# Patient Record
Sex: MALE | Race: WHITE | NOT HISPANIC OR LATINO | Employment: FULL TIME | URBAN - METROPOLITAN AREA
[De-identification: names, ages, dates, MRNs, and addresses within clinical notes are randomized per-mention and may not be internally consistent; named-entity substitution may affect disease eponyms.]

---

## 2017-05-23 ENCOUNTER — TRANSCRIBE ORDERS (OUTPATIENT)
Dept: URGENT CARE | Facility: CLINIC | Age: 52
End: 2017-05-23

## 2017-05-23 ENCOUNTER — APPOINTMENT (OUTPATIENT)
Dept: LAB | Facility: CLINIC | Age: 52
End: 2017-05-23
Payer: COMMERCIAL

## 2017-05-23 DIAGNOSIS — N42.9 UNSPECIFIED DISORDER OF PROSTATE: ICD-10-CM

## 2017-05-23 DIAGNOSIS — I10 ESSENTIAL HYPERTENSION, MALIGNANT: Primary | ICD-10-CM

## 2017-05-23 DIAGNOSIS — E55.9 UNSPECIFIED VITAMIN D DEFICIENCY: ICD-10-CM

## 2017-05-23 DIAGNOSIS — E78.5 OTHER AND UNSPECIFIED HYPERLIPIDEMIA: ICD-10-CM

## 2017-05-23 DIAGNOSIS — Z79.82 ENCOUNTER FOR LONG-TERM (CURRENT) USE OF ASPIRIN: ICD-10-CM

## 2017-05-23 DIAGNOSIS — E13.8 DIABETES MELLITUS OF OTHER TYPE WITH COMPLICATION: ICD-10-CM

## 2017-05-23 DIAGNOSIS — Z79.899 ENCOUNTER FOR LONG-TERM (CURRENT) USE OF OTHER MEDICATIONS: ICD-10-CM

## 2017-05-23 DIAGNOSIS — I10 ESSENTIAL HYPERTENSION, MALIGNANT: ICD-10-CM

## 2017-05-23 LAB
25(OH)D3 SERPL-MCNC: 15.9 NG/ML (ref 30–100)
ALBUMIN SERPL BCP-MCNC: 4.1 G/DL (ref 3.5–5)
ALP SERPL-CCNC: 55 U/L (ref 46–116)
ALT SERPL W P-5'-P-CCNC: 25 U/L (ref 12–78)
ANION GAP SERPL CALCULATED.3IONS-SCNC: 4 MMOL/L (ref 4–13)
AST SERPL W P-5'-P-CCNC: 20 U/L (ref 5–45)
BILIRUB SERPL-MCNC: 0.57 MG/DL (ref 0.2–1)
BUN SERPL-MCNC: 13 MG/DL (ref 5–25)
CALCIUM SERPL-MCNC: 9.2 MG/DL (ref 8.3–10.1)
CHLORIDE SERPL-SCNC: 105 MMOL/L (ref 100–108)
CHOLEST SERPL-MCNC: 141 MG/DL (ref 50–200)
CO2 SERPL-SCNC: 30 MMOL/L (ref 21–32)
CREAT SERPL-MCNC: 0.9 MG/DL (ref 0.6–1.3)
ERYTHROCYTE [DISTWIDTH] IN BLOOD BY AUTOMATED COUNT: 12 % (ref 11.6–15.1)
EST. AVERAGE GLUCOSE BLD GHB EST-MCNC: 151 MG/DL
GFR SERPL CREATININE-BSD FRML MDRD: >60 ML/MIN/1.73SQ M
GLUCOSE P FAST SERPL-MCNC: 115 MG/DL (ref 65–99)
HBA1C MFR BLD: 6.9 % (ref 4.2–6.3)
HCT VFR BLD AUTO: 43.9 % (ref 36.5–49.3)
HDLC SERPL-MCNC: 27 MG/DL (ref 40–60)
HGB BLD-MCNC: 15 G/DL (ref 12–17)
LDLC SERPL CALC-MCNC: 85 MG/DL (ref 0–100)
MCH RBC QN AUTO: 31.6 PG (ref 26.8–34.3)
MCHC RBC AUTO-ENTMCNC: 34.2 G/DL (ref 31.4–37.4)
MCV RBC AUTO: 92 FL (ref 82–98)
PLATELET # BLD AUTO: 196 THOUSANDS/UL (ref 149–390)
PMV BLD AUTO: 11.8 FL (ref 8.9–12.7)
POTASSIUM SERPL-SCNC: 3.9 MMOL/L (ref 3.5–5.3)
PROT SERPL-MCNC: 8 G/DL (ref 6.4–8.2)
PSA SERPL-MCNC: 0.6 NG/ML (ref 0–4)
RBC # BLD AUTO: 4.75 MILLION/UL (ref 3.88–5.62)
SODIUM SERPL-SCNC: 139 MMOL/L (ref 136–145)
TRIGL SERPL-MCNC: 143 MG/DL
WBC # BLD AUTO: 9.42 THOUSAND/UL (ref 4.31–10.16)

## 2017-05-23 PROCEDURE — 83036 HEMOGLOBIN GLYCOSYLATED A1C: CPT

## 2017-05-23 PROCEDURE — 36415 COLL VENOUS BLD VENIPUNCTURE: CPT

## 2017-05-23 PROCEDURE — 80053 COMPREHEN METABOLIC PANEL: CPT

## 2017-05-23 PROCEDURE — 80061 LIPID PANEL: CPT

## 2017-05-23 PROCEDURE — 82306 VITAMIN D 25 HYDROXY: CPT

## 2017-05-23 PROCEDURE — 85027 COMPLETE CBC AUTOMATED: CPT

## 2017-05-23 PROCEDURE — G0103 PSA SCREENING: HCPCS

## 2017-10-12 ENCOUNTER — OFFICE VISIT (OUTPATIENT)
Dept: URGENT CARE | Facility: CLINIC | Age: 52
End: 2017-10-12
Payer: COMMERCIAL

## 2017-10-12 ENCOUNTER — TRANSCRIBE ORDERS (OUTPATIENT)
Dept: URGENT CARE | Facility: CLINIC | Age: 52
End: 2017-10-12

## 2017-10-12 PROCEDURE — 99213 OFFICE O/P EST LOW 20 MIN: CPT

## 2017-10-14 NOTE — PROGRESS NOTES
Assessment  1  Acute pharyngitis (462) (J02 9)    Plan  Acute pharyngitis    · Amoxicillin 875 MG Oral Tablet; Take 1 tab twice daily  Hyperlipidemia    · Clopidogrel Bisulfate 75 MG Oral Tablet    Discussion/Summary  Medication Side Effects Reviewed: Possible side effects of new medications were reviewed with the patient/guardian today  Understands and agrees with treatment plan: The treatment plan was reviewed with the patient/guardian  The patient/guardian understands and agrees with the treatment plan   Follow Up Instructions: Follow Up with your Primary Care Provider in 7 days  If your symptoms worsen, go to the nearest [facility] Emergency Department  Chief Complaint  1  Sore Throat  Chief Complaint Free Text Note Form: C/O fever, chills, sore throat and difficulty swallowing x 3 days  Pt has also been having diarrhea which he is controlling with Imodium  History of Present Illness  HPI: 59-year-old male here with complaint of fever, chills and sore throat for the last 3 days  Also has some diarrhea and upset stomach  Hospital Based Practices Required Assessment:   Pain Assessment   the patient states they have pain  The pain is located in the throat  The patient describes the pain as aching  (on a scale of 0 to 10, the patient rates the pain at 7 )   Abuse And Domestic Violence Screen    Yes, the patient is safe at home  -- The patient states no one is hurting them  Depression And Suicide Screen  No, the patient has not had thoughts of hurting themself  No, the patient has not felt depressed in the past 7 days  Sore Throat: JAMES GARCIA presents with complaints of sore throat  Associated symptoms include dysphagia,-- myalgias,-- fever,-- chills,-- headache,-- nausea-- and-- fatigue, but-- no nasal congestion,-- no postnasal drainage,-- no swollen glands,-- no drooling,-- no stridor,-- no vomiting,-- no cough,-- no rash-- and-- no anorexia        Review of Systems  Focused-Male: Constitutional: fever,-- feeling poorly,-- chills-- and-- feeling tired  ENT: sore throat, but-- as noted in HPI  Cardiovascular: no complaints of slow or fast heart rate, no chest pain, no palpitations, no leg claudication or lower extremity edema  Respiratory: no complaints of shortness of breath, no wheezing or cough, no dyspnea on exertion, no orthopnea or PND  Gastrointestinal: nausea-- and-- diarrhea, but-- as noted in HPI-- and-- no vomiting  ROS Reviewed:   ROS reviewed  Active Problems  1  Acute myocardial infarction (410 90) (I21 9)   2  Benign essential hypertension (401 1) (I10)   3  CAD, multiple vessel (414 00) (I25 10)   4  Diabetes mellitus (250 00) (E11 9)   5  Dizziness (780 4) (R42)   6  Dizziness (780 4) (R42)   7  Hyperlipidemia (272 4) (E78 5)   8  Hypertension (401 9) (I10)   9  Old MI (myocardial infarction) (412) (I25 2)   10  ST elevation myocardial infarction (STEMI) of inferior wall (410 40) (I21 19)    Past Medical History  1  History of Diabetes mellitus due to underlying condition with complications (408 37)   (E08 8)   2  Hypertension (401 9) (I10)   3  History of Insect bite, infected (919 5,E906 4) (W57 XXXA)   4  No pertinent past medical history  Active Problems And Past Medical History Reviewed: The active problems and past medical history were reviewed and updated today  Family History  Family History    1  Family history of cardiac disorder (V17 49) (Z82 49)   2  Family history of cerebrovascular accident (V17 1) (Z82 3)   3  Family history of diabetes mellitus (V18 0) (Z83 3)   4  Family history of hypertension (V17 49) (Z82 49)  Family History Reviewed: The family history was reviewed and updated today  Social History   · Former smoker (G16 67) (X40 235)  Social History Reviewed: The social history was reviewed and updated today  The social history was reviewed and is unchanged  Surgical History  1  History of Cath Stent Placement   2  History of Elbow Surgery   3  History of Hernia Repair   4  History of Knee Surgery  Surgical History Reviewed: The surgical history was reviewed and updated today  Current Meds   1  Aspir-81 81 MG Oral Tablet Delayed Release; TAKE 1 TABLET DAILY AS DIRECTED; Therapy: (Recorded:27Ptz8137) to Recorded   2  Clopidogrel Bisulfate 75 MG Oral Tablet; TAKE ONE TABLET DAILY   ;   Therapy: 91QHF0494 to (Last Rx:21Cbp7130)  Requested for: 01Bez6697 Ordered   3  Crestor 20 MG Oral Tablet; TAKE ONE TABLET DAILY   ;   Therapy: 59OJA3849 to (Last Rx:66Jzm0717)  Requested for: 75Pha9346 Ordered   4  Glucophage 500 MG Oral Tablet; Take 1 tablet twice daily; Therapy: 21WCQ4473 to  Requested for: 27Oct2016 Recorded   5  Valsartan 80 MG Oral Tablet; TAKE ONE TABLET DAILY   ;   Therapy: 51Iyx4567 to (Last Rx:31Wer5530)  Requested for: 29SRE2622 Ordered  Medication List Reviewed: The medication list was reviewed and updated today  Allergies  1  TETANUS    Vitals  Signs   Recorded: 60KVT5713 02:06PM   Temperature: 97 7 F  Heart Rate: 92  Respiration: 20  Systolic: 585  Diastolic: 82  Height: 6 ft   Weight: 222 lb   BMI Calculated: 30 11  BSA Calculated: 2 23  O2 Saturation: 98  Pain Scale: 7    Physical Exam    Constitutional   General appearance: No acute distress, well appearing and well nourished  Ears, Nose, Mouth, and Throat   External inspection of ears and nose: Normal     Otoscopic examination: Tympanic membrance translucent with normal light reflex  Canals patent without erythema  Nasal mucosa, septum, and turbinates: Normal without edema or erythema  Oropharynx: Abnormal   The posterior pharynx was erythematous-- and-- had an exudate  Pulmonary   Respiratory effort: No increased work of breathing or signs of respiratory distress  Auscultation of lungs: Clear to auscultation  Cardiovascular   Auscultation of heart: Normal rate and rhythm, normal S1 and S2, without murmurs      Abdomen   Abdomen: Non-tender, no masses         Signatures   Electronically signed by : Farideh Butler [facility]; Oct 12 2017  2:21PM EST                       (Author)    Electronically signed by : Melecio Kussmaul, D O ; Oct 13 2017 12:02PM EST                       (Co-author)

## 2017-10-23 ENCOUNTER — ALLSCRIPTS OFFICE VISIT (OUTPATIENT)
Dept: OTHER | Facility: OTHER | Age: 52
End: 2017-10-23

## 2017-10-23 ENCOUNTER — GENERIC CONVERSION - ENCOUNTER (OUTPATIENT)
Dept: OTHER | Facility: OTHER | Age: 52
End: 2017-10-23

## 2017-11-27 ENCOUNTER — APPOINTMENT (OUTPATIENT)
Dept: LAB | Facility: CLINIC | Age: 52
End: 2017-11-27
Payer: COMMERCIAL

## 2017-11-27 ENCOUNTER — TRANSCRIBE ORDERS (OUTPATIENT)
Dept: URGENT CARE | Facility: CLINIC | Age: 52
End: 2017-11-27

## 2017-11-27 DIAGNOSIS — E55.9 VITAMIN D DEFICIENCY: ICD-10-CM

## 2017-11-27 DIAGNOSIS — E78.5 HYPERLIPIDEMIA, UNSPECIFIED HYPERLIPIDEMIA TYPE: ICD-10-CM

## 2017-11-27 DIAGNOSIS — E11.9 TYPE 2 DIABETES MELLITUS WITHOUT COMPLICATION, WITHOUT LONG-TERM CURRENT USE OF INSULIN (HCC): ICD-10-CM

## 2017-11-27 DIAGNOSIS — E11.9 TYPE 2 DIABETES MELLITUS WITHOUT COMPLICATION, WITHOUT LONG-TERM CURRENT USE OF INSULIN (HCC): Primary | ICD-10-CM

## 2017-11-27 LAB
25(OH)D3 SERPL-MCNC: 22.2 NG/ML (ref 30–100)
ALBUMIN SERPL BCP-MCNC: 4.2 G/DL (ref 3.5–5)
ALP SERPL-CCNC: 58 U/L (ref 46–116)
ALT SERPL W P-5'-P-CCNC: 23 U/L (ref 12–78)
ANION GAP SERPL CALCULATED.3IONS-SCNC: 5 MMOL/L (ref 4–13)
AST SERPL W P-5'-P-CCNC: 23 U/L (ref 5–45)
BILIRUB SERPL-MCNC: 0.42 MG/DL (ref 0.2–1)
BUN SERPL-MCNC: 16 MG/DL (ref 5–25)
CALCIUM SERPL-MCNC: 9.4 MG/DL (ref 8.3–10.1)
CHLORIDE SERPL-SCNC: 103 MMOL/L (ref 100–108)
CHOLEST SERPL-MCNC: 147 MG/DL (ref 50–200)
CO2 SERPL-SCNC: 29 MMOL/L (ref 21–32)
CREAT SERPL-MCNC: 0.99 MG/DL (ref 0.6–1.3)
EST. AVERAGE GLUCOSE BLD GHB EST-MCNC: 154 MG/DL
GFR SERPL CREATININE-BSD FRML MDRD: 87 ML/MIN/1.73SQ M
GLUCOSE P FAST SERPL-MCNC: 135 MG/DL (ref 65–99)
HBA1C MFR BLD: 7 % (ref 4.2–6.3)
HDLC SERPL-MCNC: 28 MG/DL (ref 40–60)
LDLC SERPL CALC-MCNC: 73 MG/DL (ref 0–100)
POTASSIUM SERPL-SCNC: 4.2 MMOL/L (ref 3.5–5.3)
PROT SERPL-MCNC: 8.3 G/DL (ref 6.4–8.2)
SODIUM SERPL-SCNC: 137 MMOL/L (ref 136–145)
TRIGL SERPL-MCNC: 230 MG/DL

## 2017-11-27 PROCEDURE — 80061 LIPID PANEL: CPT

## 2017-11-27 PROCEDURE — 82306 VITAMIN D 25 HYDROXY: CPT

## 2017-11-27 PROCEDURE — 36415 COLL VENOUS BLD VENIPUNCTURE: CPT

## 2017-11-27 PROCEDURE — 83036 HEMOGLOBIN GLYCOSYLATED A1C: CPT

## 2017-11-27 PROCEDURE — 80053 COMPREHEN METABOLIC PANEL: CPT

## 2018-01-22 VITALS
HEART RATE: 81 BPM | BODY MASS INDEX: 31.29 KG/M2 | WEIGHT: 231 LBS | SYSTOLIC BLOOD PRESSURE: 120 MMHG | DIASTOLIC BLOOD PRESSURE: 72 MMHG | HEIGHT: 72 IN

## 2018-05-26 LAB
%SAT (HISTORICAL): 33 % (ref 20–55)
25(OH)D3 SERPL-MCNC: 30.53 NG/ML
ALBUMIN (HISTORICAL): 4.9 MG/DL
ALBUMIN SERPL BCP-MCNC: 4.6 G/DL (ref 3.5–5.7)
ALP SERPL-CCNC: 44 IU/L (ref 40–150)
ALT SERPL W P-5'-P-CCNC: 15 IU/L (ref 0–50)
ANION GAP SERPL CALCULATED.3IONS-SCNC: 13 MM/L
AST SERPL W P-5'-P-CCNC: 18 U/L (ref 8–27)
BASOPHILS # BLD AUTO: 0.1 X3/UL (ref 0–0.3)
BASOPHILS # BLD AUTO: 0.8 % (ref 0–2)
BILIRUB SERPL-MCNC: 0.7 MG/DL (ref 0.3–1)
BUN SERPL-MCNC: 17 MG/DL (ref 7–25)
CALCIUM SERPL-MCNC: 9.6 MG/DL (ref 8.6–10.5)
CHLORIDE SERPL-SCNC: 101 MM/L (ref 98–107)
CHOLEST SERPL-MCNC: 141 MG/DL (ref 0–200)
CO2 SERPL-SCNC: 27 MM/L (ref 21–31)
CREAT SERPL-MCNC: 0.82 MG/DL (ref 0.7–1.3)
DEPRECATED RDW RBC AUTO: 12.5 % (ref 11.5–14.5)
EGFR (HISTORICAL): > 60 GFR
EGFR AFRICAN AMERICAN (HISTORICAL): > 60 GFR
EOSINOPHIL # BLD AUTO: 0.2 X3/UL (ref 0–0.5)
EOSINOPHIL NFR BLD AUTO: 2.1 % (ref 0–5)
EST. AVERAGE GLUCOSE BLD GHB EST-MCNC: 155 MG/DL
FERRITIN SERPL-MCNC: 371 NG/ML (ref 22–322)
GLUCOSE (HISTORICAL): 136 MG/DL (ref 65–99)
HBA1C MFR BLD HPLC: 7 % (ref 4–6.2)
HCT VFR BLD AUTO: 44.1 % (ref 42–52)
HDLC SERPL-MCNC: 25 MG/DL (ref 40–60)
HGB BLD-MCNC: 14.9 G/DL (ref 14–18)
IRON SERPL-MCNC: 117 UG/DL (ref 50–212)
LDLC SERPL CALC-MCNC: 79.9 MG/DL (ref 75–193)
LYMPHOCYTES # BLD AUTO: 2.9 X3/UL (ref 1.2–4.2)
LYMPHOCYTES NFR BLD AUTO: 34 % (ref 20.5–51.1)
MCH RBC QN AUTO: 30.7 PG (ref 26–34)
MCHC RBC AUTO-ENTMCNC: 33.8 G/DL (ref 31–36)
MCV RBC AUTO: 90.8 FL (ref 81–99)
MONOCYTES # BLD AUTO: 0.6 X3/UL (ref 0–1)
MONOCYTES NFR BLD AUTO: 7.5 % (ref 1.7–12)
NEUTROPHILS # BLD AUTO: 4.7 X3/UL (ref 1.4–6.5)
NEUTS SEG NFR BLD AUTO: 55.6 % (ref 42.2–75.2)
OSMOLALITY, SERUM (HISTORICAL): 277 MOSM (ref 262–291)
PLATELET # BLD AUTO: 183 X3/UL (ref 130–400)
PMV BLD AUTO: 9.7 FL (ref 8.6–11.7)
POTASSIUM SERPL-SCNC: 4 MM/L (ref 3.5–5.5)
PSA (HISTORICAL): 0.69 NG/ML (ref 0–4)
RBC # BLD AUTO: 4.86 X6/UL (ref 4.3–5.9)
SODIUM SERPL-SCNC: 137 MM/L (ref 134–143)
TIBC SERPL-MCNC: 357 UG/DL (ref 250–425)
TOTAL PROTEIN (HISTORICAL): 7.2 G/DL (ref 6.4–8.9)
TRANSFERRIN (HISTORICAL): 255 MG/DL (ref 215–365)
TRIGL SERPL-MCNC: 179 MG/DL (ref 44–166)
VLDL CHOLESTEROL (HISTORICAL): 36 MG/DL (ref 5–51)
WBC # BLD AUTO: 8.5 X3/UL (ref 4.8–10.8)

## 2018-05-30 LAB
HCT VFR BLD AUTO: 42.1 % (ref 37.5–51)
Lab: 1135 NG/ML
Lab: 477.8 NG/ML

## 2018-10-30 ENCOUNTER — OFFICE VISIT (OUTPATIENT)
Dept: CARDIOLOGY CLINIC | Facility: CLINIC | Age: 53
End: 2018-10-30
Payer: COMMERCIAL

## 2018-10-30 VITALS
HEART RATE: 72 BPM | DIASTOLIC BLOOD PRESSURE: 70 MMHG | BODY MASS INDEX: 31.02 KG/M2 | SYSTOLIC BLOOD PRESSURE: 140 MMHG | HEIGHT: 72 IN | WEIGHT: 229 LBS

## 2018-10-30 DIAGNOSIS — I25.10 CORONARY ARTERY DISEASE INVOLVING NATIVE CORONARY ARTERY OF NATIVE HEART WITHOUT ANGINA PECTORIS: Primary | ICD-10-CM

## 2018-10-30 DIAGNOSIS — I10 ESSENTIAL HYPERTENSION: ICD-10-CM

## 2018-10-30 DIAGNOSIS — I25.2 OLD MYOCARDIAL INFARCTION OF INFERIOR WALL, GREATER THAN 8 WEEKS: ICD-10-CM

## 2018-10-30 DIAGNOSIS — E78.00 HYPERCHOLESTEROLEMIA: ICD-10-CM

## 2018-10-30 PROCEDURE — 99213 OFFICE O/P EST LOW 20 MIN: CPT | Performed by: INTERNAL MEDICINE

## 2018-10-30 RX ORDER — FOLIC ACID/MULTIVIT,IRON,MINER 0.4MG-18MG
TABLET ORAL
COMMUNITY

## 2018-10-30 RX ORDER — IRBESARTAN 75 MG/1
TABLET ORAL
COMMUNITY
Start: 2018-10-18

## 2018-10-30 RX ORDER — AMPICILLIN TRIHYDRATE 250 MG
CAPSULE ORAL
COMMUNITY

## 2018-10-30 RX ORDER — MULTIVIT-MIN/IRON/FOLIC ACID/K 18-600-40
CAPSULE ORAL
COMMUNITY

## 2018-10-30 RX ORDER — NIACIN 100 MG
100 TABLET ORAL
COMMUNITY
End: 2020-11-05

## 2018-10-30 RX ORDER — ASPIRIN 81 MG/1
1 TABLET ORAL DAILY
COMMUNITY

## 2018-10-30 RX ORDER — SILDENAFIL 100 MG/1
TABLET, FILM COATED ORAL
COMMUNITY
End: 2018-10-30

## 2018-10-30 RX ORDER — TADALAFIL 5 MG
TABLET ORAL
COMMUNITY
Start: 2018-08-01

## 2018-10-30 RX ORDER — ROSUVASTATIN CALCIUM 20 MG/1
1 TABLET, COATED ORAL DAILY
COMMUNITY
Start: 2016-02-25

## 2018-10-30 RX ORDER — ZOLPIDEM TARTRATE 10 MG/1
TABLET ORAL
COMMUNITY

## 2018-11-01 PROBLEM — I10 ESSENTIAL HYPERTENSION: Status: ACTIVE | Noted: 2018-11-01

## 2018-11-01 PROBLEM — I25.2 OLD MYOCARDIAL INFARCTION OF INFERIOR WALL, GREATER THAN 8 WEEKS: Status: ACTIVE | Noted: 2018-11-01

## 2018-11-01 PROBLEM — E78.00 HYPERCHOLESTEROLEMIA: Status: ACTIVE | Noted: 2018-11-01

## 2018-11-01 PROBLEM — I25.10 CORONARY ARTERY DISEASE INVOLVING NATIVE CORONARY ARTERY OF NATIVE HEART WITHOUT ANGINA PECTORIS: Status: ACTIVE | Noted: 2018-11-01

## 2018-11-02 NOTE — PROGRESS NOTES
Cardiology Follow Up    Rody Moreno  1965  [id number]  [address] [facility] [address] [place] [address]    1  Coronary artery disease involving native coronary artery of native heart without angina pectoris     2  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, greater than 8 weeks     3  Essential hypertension     4  Hypercholesterolemia           Discussion/Summary: All of his assessed cardiac problems are stable  I have reviewed his medications and made no changes  No cardiac testing is ordered  I advised him to start exercising again  RTO 1 year, repeat EST at that time  Interval History: He has not had any cardiac problems since his last OV  He is less active and not exercising regularly  He has CAD with an inferior wall MI and RCA stenting in 11/2015  His last stress test was 5/2016 - negative for ischemia  He remains on Crestor 20 mg daily  Patient Active Problem List   Diagnosis    Coronary artery disease involving native coronary artery of native heart without angina pectoris    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, greater than 8 weeks    Essential hypertension    Hypercholesterolemia     No past medical history on file  Social History     Social History    Marital status:      Spouse name: N/A    Number of children: N/A    Years of education: N/A     Occupational History    Not on file  Social History Main Topics    Smoking status: Former Smoker     Types: Cigarettes     Start date: 1977     Quit date: 2005    Smokeless tobacco: Former User    Alcohol use Not on file    Drug use: Unknown    Sexual activity: Not on file     Other Topics Concern    Not on file     Social History Narrative    No narrative on file      No family history on file  No past surgical history on file      Current Outpatient Prescriptions:     aspirin (ASPIR-81) 81 mg EC tablet, Take 1 tablet by mouth daily, Disp: , Rfl:     Cholecalciferol (VITAMIN D) 2000 units CAPS, Take by mouth, Disp: , Rfl:     CIALIS 5 MG tablet, , Disp: , Rfl:     irbesartan (AVAPRO) 75 mg tablet, , Disp: , Rfl:     Krill Oil 350 MG CAPS, Take by mouth, Disp: , Rfl:     metFORMIN (GLUCOPHAGE) 500 mg tablet, TAKE 1 TABLET TWICE DAILY FOR 30 DAYS   , Disp: , Rfl:     niacin 100 mg tablet, Take 100 mg by mouth daily with breakfast, Disp: , Rfl:     Red Yeast Rice 600 MG CAPS, Take by mouth, Disp: , Rfl:     rosuvastatin (CRESTOR) 20 MG tablet, Take 1 tablet by mouth daily, Disp: , Rfl:     zolpidem (AMBIEN) 10 mg tablet, one tab at bedtime as needed, Disp: , Rfl:   Allergies   Allergen Reactions    Banana Throat Swelling    Tetanus Toxoid Other (See Comments)     Vitals:    10/30/18 1548   BP: 140/70   BP Location: Left arm   Patient Position: Sitting   Cuff Size: Standard   Pulse: 72   Weight: 104 kg (229 lb)   Height: 6' (1 829 m)     Weight (last 2 days)     Date/Time   Weight    10/30/18 1548  104 (229)             Blood pressure 140/70, pulse 72, height 6' (1 829 m), weight 104 kg (229 lb)  , Body mass index is 31 06 kg/m²      Labs:  Orders Only on 05/26/2018   Component Date Value    WBC 05/26/2018 8 5     RBC 05/26/2018 4 86     Hemoglobin 05/26/2018 14 9     Hematocrit 05/26/2018 44 1     MCV 05/26/2018 90 8     MCH 05/26/2018 30 7     MCHC 05/26/2018 33 8     RDW 05/26/2018 12 5     Platelets 07/38/2968 183     MPV 05/26/2018 9 7     Neutrophils Relative 05/26/2018 55 6     Lymphocytes Relative 05/26/2018 34 0     Monocytes Relative 05/26/2018 7 5     Eosinophils Relative 05/26/2018 2 1     Basophils Relative 05/26/2018 0 8     Neutrophils Absolute 05/26/2018 4 7     Lymphocytes Absolute 05/26/2018 2 9     Monocytes Absolute 05/26/2018 0 6     Eosinophils Absolute 05/26/2018 0 2     Basophils Absolute 05/26/2018 0 1     Glucose 05/26/2018 136*    BUN 05/26/2018 17     Creatinine 05/26/2018 0 82     Sodium 05/26/2018 137     Potassium 05/26/2018 4 0     Chloride 05/26/2018 101     CO2 05/26/2018 27     Calcium 05/26/2018 9 6     Anion Gap 05/26/2018 13 0     OSMOLALITY, SERUM 05/26/2018 277     Total Protein 05/26/2018 7 2     Albumin 05/26/2018 4 6     Total Bilirubin 05/26/2018 0 7     AST 05/26/2018 18     ALT 05/26/2018 15     Alkaline Phosphatase 05/26/2018 44     EGFR (HISTORICAL) 05/26/2018 > 60     eGFR  05/26/2018 > 60     Cholesterol 05/26/2018 141     Triglycerides 05/26/2018 179*    VLDL CHOLESTEROL 05/26/2018 36 0     HDL 05/26/2018 25*    LDL Calculated 05/26/2018 79 9     Iron 05/26/2018 117     TRANSFERRIN 05/26/2018 255     TIBC 05/26/2018 357     %SAT (HISTORICAL) 05/26/2018 33     ALBUMIN 05/26/2018 4 9     PSA 05/26/2018 0 69     Ferritin 05/26/2018 371 0*    Vit D, 25-Hydroxy 05/26/2018 30 53     Hemoglobin A1C 05/26/2018 7 0*    EAG 05/26/2018 155     FOLATE, HEMOLYSATE 05/26/2018 477 8     Hematocrit 05/26/2018 42 1     RBC FOLATE 05/26/2018 1135      Imaging: No results found  Review of Systems:  Review of Systems   Constitutional: Negative for diaphoresis, fatigue, fever and unexpected weight change  HENT: Negative  Respiratory: Negative for cough, shortness of breath and wheezing  Cardiovascular: Negative for chest pain, palpitations and leg swelling  Gastrointestinal: Negative for abdominal pain, diarrhea and nausea  Musculoskeletal: Negative for gait problem and myalgias  Skin: Negative for rash  Neurological: Negative for dizziness and numbness  Psychiatric/Behavioral: Negative  Physical Exam:  Physical Exam   Constitutional: He is oriented to person, place, and time  He appears well-developed and well-nourished  HENT:   Head: Normocephalic and atraumatic  Eyes: Pupils are equal, round, and reactive to light  Neck: Normal range of motion  Neck supple  No JVD present  Cardiovascular: Regular rhythm, S1 normal, S2 normal and normal pulses  Pulses:       Carotid pulses are 2+ on the right side, and 2+ on the left side  Pulmonary/Chest: Effort normal and breath sounds normal  He has no wheezes  He has no rales  Abdominal: Soft  Bowel sounds are normal  There is no tenderness  Musculoskeletal: Normal range of motion  He exhibits no edema or tenderness  Neurological: He is alert and oriented to person, place, and time  He has normal reflexes  No cranial nerve deficit  Skin: Skin is warm  Psychiatric: He has a normal mood and affect

## 2018-12-07 ENCOUNTER — TRANSCRIBE ORDERS (OUTPATIENT)
Dept: LAB | Facility: CLINIC | Age: 53
End: 2018-12-07

## 2018-12-07 ENCOUNTER — APPOINTMENT (OUTPATIENT)
Dept: LAB | Facility: CLINIC | Age: 53
End: 2018-12-07
Payer: COMMERCIAL

## 2018-12-07 DIAGNOSIS — I10 HYPERTENSION, UNSPECIFIED TYPE: Primary | ICD-10-CM

## 2018-12-07 DIAGNOSIS — E78.5 HYPERLIPIDEMIA, UNSPECIFIED HYPERLIPIDEMIA TYPE: ICD-10-CM

## 2018-12-07 DIAGNOSIS — Z79.4 TYPE 2 DIABETES MELLITUS WITHOUT COMPLICATION, WITH LONG-TERM CURRENT USE OF INSULIN (HCC): ICD-10-CM

## 2018-12-07 DIAGNOSIS — E11.9 TYPE 2 DIABETES MELLITUS WITHOUT COMPLICATION, WITH LONG-TERM CURRENT USE OF INSULIN (HCC): ICD-10-CM

## 2018-12-07 LAB
ALBUMIN SERPL BCP-MCNC: 4.2 G/DL (ref 3.5–5)
ALP SERPL-CCNC: 70 U/L (ref 46–116)
ALT SERPL W P-5'-P-CCNC: 23 U/L (ref 12–78)
ANION GAP SERPL CALCULATED.3IONS-SCNC: 6 MMOL/L (ref 4–13)
AST SERPL W P-5'-P-CCNC: 18 U/L (ref 5–45)
BILIRUB SERPL-MCNC: 0.56 MG/DL (ref 0.2–1)
BUN SERPL-MCNC: 13 MG/DL (ref 5–25)
CALCIUM SERPL-MCNC: 9.7 MG/DL (ref 8.3–10.1)
CHLORIDE SERPL-SCNC: 103 MMOL/L (ref 100–108)
CHOLEST SERPL-MCNC: 118 MG/DL (ref 50–200)
CO2 SERPL-SCNC: 28 MMOL/L (ref 21–32)
CREAT SERPL-MCNC: 0.98 MG/DL (ref 0.6–1.3)
CREAT UR-MCNC: 92.5 MG/DL
EST. AVERAGE GLUCOSE BLD GHB EST-MCNC: 194 MG/DL
GFR SERPL CREATININE-BSD FRML MDRD: 88 ML/MIN/1.73SQ M
GLUCOSE P FAST SERPL-MCNC: 130 MG/DL (ref 65–99)
HBA1C MFR BLD: 8.4 % (ref 4.2–6.3)
HDLC SERPL-MCNC: 24 MG/DL (ref 40–60)
LDLC SERPL CALC-MCNC: 60 MG/DL (ref 0–100)
MICROALBUMIN UR-MCNC: 40.8 MG/L (ref 0–20)
MICROALBUMIN/CREAT 24H UR: 44 MG/G CREATININE (ref 0–30)
NONHDLC SERPL-MCNC: 94 MG/DL
POTASSIUM SERPL-SCNC: 4 MMOL/L (ref 3.5–5.3)
PROT SERPL-MCNC: 7.9 G/DL (ref 6.4–8.2)
SODIUM SERPL-SCNC: 137 MMOL/L (ref 136–145)
TRIGL SERPL-MCNC: 168 MG/DL

## 2018-12-07 PROCEDURE — 82570 ASSAY OF URINE CREATININE: CPT

## 2018-12-07 PROCEDURE — 80053 COMPREHEN METABOLIC PANEL: CPT

## 2018-12-07 PROCEDURE — 36415 COLL VENOUS BLD VENIPUNCTURE: CPT

## 2018-12-07 PROCEDURE — 80061 LIPID PANEL: CPT

## 2018-12-07 PROCEDURE — 83036 HEMOGLOBIN GLYCOSYLATED A1C: CPT

## 2018-12-07 PROCEDURE — 82043 UR ALBUMIN QUANTITATIVE: CPT

## 2019-01-02 ENCOUNTER — APPOINTMENT (EMERGENCY)
Dept: RADIOLOGY | Facility: HOSPITAL | Age: 54
End: 2019-01-02
Payer: COMMERCIAL

## 2019-01-02 ENCOUNTER — HOSPITAL ENCOUNTER (EMERGENCY)
Facility: HOSPITAL | Age: 54
Discharge: HOME | End: 2019-01-02
Attending: EMERGENCY MEDICINE | Admitting: EMERGENCY MEDICINE
Payer: COMMERCIAL

## 2019-01-02 VITALS
DIASTOLIC BLOOD PRESSURE: 84 MMHG | OXYGEN SATURATION: 99 % | BODY MASS INDEX: 31.53 KG/M2 | SYSTOLIC BLOOD PRESSURE: 152 MMHG | TEMPERATURE: 97 F | RESPIRATION RATE: 18 BRPM | HEIGHT: 72 IN | WEIGHT: 232.81 LBS | HEART RATE: 81 BPM

## 2019-01-02 DIAGNOSIS — M54.16 LUMBAR RADICULOPATHY: Primary | ICD-10-CM

## 2019-01-02 PROCEDURE — 73562 X-RAY EXAM OF KNEE 3: CPT

## 2019-01-02 PROCEDURE — 99283 EMERGENCY DEPT VISIT LOW MDM: CPT

## 2019-01-02 PROCEDURE — 73502 X-RAY EXAM HIP UNI 2-3 VIEWS: CPT

## 2019-01-02 PROCEDURE — 72100 X-RAY EXAM L-S SPINE 2/3 VWS: CPT

## 2019-01-02 PROCEDURE — 96372 THER/PROPH/DIAG INJ SC/IM: CPT

## 2019-01-02 RX ORDER — METHOCARBAMOL 750 MG/1
750 TABLET, FILM COATED ORAL 2 TIMES DAILY
Qty: 20 TABLET | Refills: 0 | Status: SHIPPED | OUTPATIENT
Start: 2019-01-02 | End: 2019-11-25

## 2019-01-02 RX ORDER — KETOROLAC TROMETHAMINE 30 MG/ML
30 INJECTION, SOLUTION INTRAMUSCULAR; INTRAVENOUS ONCE
Status: COMPLETED | OUTPATIENT
Start: 2019-01-02 | End: 2019-01-02

## 2019-01-02 RX ORDER — NAPROXEN 500 MG/1
500 TABLET ORAL 2 TIMES DAILY WITH MEALS
Qty: 30 TABLET | Refills: 0 | Status: SHIPPED | OUTPATIENT
Start: 2019-01-02

## 2019-01-02 RX ORDER — METHYLPREDNISOLONE 4 MG/1
TABLET ORAL
Qty: 21 TABLET | Refills: 0 | Status: SHIPPED | OUTPATIENT
Start: 2019-01-02 | End: 2019-11-25

## 2019-01-02 RX ORDER — DEXAMETHASONE SODIUM PHOSPHATE 10 MG/ML
10 INJECTION, SOLUTION INTRAMUSCULAR; INTRAVENOUS ONCE
Status: COMPLETED | OUTPATIENT
Start: 2019-01-02 | End: 2019-01-02

## 2019-01-02 RX ORDER — METHOCARBAMOL 500 MG/1
750 TABLET, FILM COATED ORAL ONCE
Status: COMPLETED | OUTPATIENT
Start: 2019-01-02 | End: 2019-01-02

## 2019-01-02 RX ADMIN — KETOROLAC TROMETHAMINE 30 MG: 30 INJECTION, SOLUTION INTRAMUSCULAR at 08:14

## 2019-01-02 RX ADMIN — METHOCARBAMOL 750 MG: 500 TABLET ORAL at 08:45

## 2019-01-02 RX ADMIN — DEXAMETHASONE SODIUM PHOSPHATE 10 MG: 10 INJECTION, SOLUTION INTRAMUSCULAR; INTRAVENOUS at 08:16

## 2019-01-02 NOTE — ED PROVIDER NOTES
Pt Name: Sheri Garcia  MRN: [id number]  [place] 1965  Age/Sex: 48 y o  male  Date of evaluation: 1/2/2019  PCP: Terri Phillips MD    [address] [place] [address]    Chief Complaint   Patient presents with    Hip Pain     Paitent complains of chronic left sided hip pain radiating into back with sciatica sharp pain going down leg  Was due to have knee surgery 10 year ago but decided against it and believe they are all connected         HPI    Emma Mohamud presents to the Emergency Department complaining of pain in low back, left hip and left knee  He has been told years ago that he may need a knee replacement  He has not seen an ortho doc in a years  He now feels pain in his back and across left buttock down to the level of the knee  HPI      Past Medical and Surgical History    Past Medical History:   Diagnosis Date    Cardiac disease     Hyperlipidemia     Hypertension     MI (myocardial infarction) ([address] )        Past Surgical History:   Procedure Laterality Date    CARDIAC SURGERY      CORONARY ANGIOPLASTY WITH STENT PLACEMENT      DEBRIDEMENT TENNIS ELBOW      HERNIA REPAIR      KNEE ARTHROSCOPY      SHOULDER ARTHROSCOPY      VASECTOMY         History reviewed  No pertinent family history  Social History   Substance Use Topics    Smoking status: Former Smoker     Types: Cigarettes     Start date: 1977     Quit date: 2005    Smokeless tobacco: Former User    Alcohol use No              Allergies    Allergies   Allergen Reactions    Banana Throat Swelling    Tetanus Toxoid Other (See Comments)       Home Medications    Prior to Admission medications    Medication Sig Start Date End Date Taking?  Authorizing Provider   aspirin (ASPIR-81) 81 mg EC tablet Take 1 tablet by mouth daily    Historical Provider, MD   Cholecalciferol (VITAMIN D) 2000 units CAPS Take by mouth    Historical Provider, MD   CIALIS 5 MG tablet  8/1/18   Historical Provider, MD   irbesartan (AVAPRO) 75 mg tablet  10/18/18 Historical Provider, MD Concepcion Magaña Oil 350 MG CAPS Take by mouth    Historical Provider, MD   metFORMIN (GLUCOPHAGE) 500 mg tablet TAKE 1 TABLET TWICE DAILY FOR 30 DAYS    Historical Provider, MD   niacin 100 mg tablet Take 100 mg by mouth daily with breakfast    Historical Provider, MD   Red Yeast Rice 600 MG CAPS Take by mouth    Historical Provider, MD   rosuvastatin (CRESTOR) 20 MG tablet Take 1 tablet by mouth daily 2/25/16   Historical Provider, MD   zolpidem (AMBIEN) 10 mg tablet one tab at bedtime as needed    Historical Provider, MD           Review of Systems    Review of Systems   Constitutional: Negative for activity change, appetite change, chills, fatigue and fever  HENT: Negative for congestion, rhinorrhea, sinus pressure, sneezing, sore throat and trouble swallowing  Eyes: Negative for photophobia and visual disturbance  Respiratory: Negative for chest tightness, shortness of breath and wheezing  Cardiovascular: Negative for chest pain and leg swelling  Gastrointestinal: Negative for abdominal distention, abdominal pain, constipation, diarrhea, nausea and vomiting  Endocrine: Negative for polydipsia, polyphagia and polyuria  Genitourinary: Negative for decreased urine volume, difficulty urinating, dysuria, flank pain, frequency and urgency  Musculoskeletal: Positive for back pain  Negative for gait problem, joint swelling and neck pain  Skin: Negative for color change, pallor and rash  Allergic/Immunologic: Negative for immunocompromised state  Neurological: Negative for seizures, syncope, speech difficulty, weakness, light-headedness and headaches  Psychiatric/Behavioral: Negative for confusion  All other systems reviewed and are negative          Physical Exam      ED Triage Vitals [01/02/19 0736]   Temperature Pulse Respirations Blood Pressure SpO2   (!) 97 °F (36 1 °C) 85 18 142/93 99 %      Temp Source Heart Rate Source Patient Position - Orthostatic VS BP Location FiO2 (%)   Temporal Monitor Lying Right arm --      Pain Score       7               Physical Exam   Constitutional: He is oriented to person, place, and time  He appears well-developed and well-nourished  No distress  HENT:   Head: Normocephalic and atraumatic  Nose: Nose normal    Mouth/Throat: Oropharynx is clear and moist    Eyes: Pupils are equal, round, and reactive to light  Conjunctivae and EOM are normal    Neck: Normal range of motion  Neck supple  Cardiovascular: Normal rate, regular rhythm and normal heart sounds  Exam reveals no gallop and no friction rub  No murmur heard  Pulmonary/Chest: Effort normal and breath sounds normal  No respiratory distress  He has no wheezes  He has no rales  Abdominal: Soft  Bowel sounds are normal  There is no tenderness  There is no rebound and no guarding  Musculoskeletal:        Left hip: He exhibits decreased range of motion and tenderness  He exhibits no swelling and no deformity  Left knee: He exhibits decreased range of motion and bony tenderness  He exhibits no swelling, no effusion, no deformity and no erythema  Tenderness found  Lumbar back: He exhibits decreased range of motion, tenderness and pain  He exhibits no bony tenderness, no swelling, no edema, no deformity and no laceration  Neurological: He is alert and oriented to person, place, and time  Skin: Skin is warm and dry  He is not diaphoretic  Psychiatric: He has a normal mood and affect  His behavior is normal    Nursing note and vitals reviewed  Assessment and Plan    Stoney Avila is a 48 y o  male who presents with pain  Physical examination remarkable for tenderness  Differential diagnosis (not completely inclusive) includes sciatica, lumbar radiculopathy  Plan will be to perform diagnostic testing and treat symptomatically        MDM    Diagnostic Results        Labs:    Results for orders placed or performed in visit on 12/07/18   Comprehensive metabolic panel Result Value Ref Range    Sodium 137 136 - 145 mmol/L    Potassium 4 0 3 5 - 5 3 mmol/L    Chloride 103 100 - 108 mmol/L    CO2 28 21 - 32 mmol/L    ANION GAP 6 4 - 13 mmol/L    BUN 13 5 - 25 mg/dL    Creatinine 0 98 0 60 - 1 30 mg/dL    Glucose, Fasting 130 (H) 65 - 99 mg/dL    Calcium 9 7 8 3 - 10 1 mg/dL    AST 18 5 - 45 U/L    ALT 23 12 - 78 U/L    Alkaline Phosphatase 70 46 - 116 U/L    Total Protein 7 9 6 4 - 8 2 g/dL    Albumin 4 2 3 5 - 5 0 g/dL    Total Bilirubin 0 56 0 20 - 1 00 mg/dL    eGFR 88 ml/min/1 73sq m   Lipid panel   Result Value Ref Range    Cholesterol 118 50 - 200 mg/dL    Triglycerides 168 (H) <=150 mg/dL    HDL, Direct 24 (L) 40 - 60 mg/dL    LDL Calculated 60 0 - 100 mg/dL    Non-HDL-Chol (CHOL-HDL) 94 mg/dl   Hemoglobin A1C   Result Value Ref Range    Hemoglobin A1C 8 4 (H) 4 2 - 6 3 %     mg/dl   Microalbumin / creatinine urine ratio   Result Value Ref Range    Creatinine, Ur 92 5 mg/dL    Microalbum  ,U,Random 40 8 (H) 0 0 - 20 0 mg/L    Microalb Creat Ratio 44 (H) 0 - 30 mg/g creatinine       All labs reviewed and utilized in the medical decision making process    Radiology:    XR lumbar spine 2 or 3 views   Final Result      Bilateral pars interarticularis defects of L5 with grade 1 anterolisthesis of L5 on S1  Mild degenerative changes as described  Workstation performed: [id number]         XR hip/pelv 2-3 vws left   Final Result      No acute osseous abnormality  Workstation performed: [id number]         XR knee 3 views left non injury   Final Result      No acute osseous abnormality  Degenerative changes as described              Workstation performed: [id number]             All radiology studies independently viewed by me and interpreted by the radiologist     Procedure    Procedures    CritCare Time      ED Course of Care and Re-Assessments      Medications   methocarbamol (ROBAXIN) tablet 750 mg (750 mg Oral Given 1/2/19 5574) dexamethasone (PF) (DECADRON) injection 10 mg (10 mg Intramuscular Given 1/2/19 0816)   ketorolac (TORADOL) injection 30 mg (30 mg Intramuscular Given 1/2/19 0814)           FINAL IMPRESSION    Final diagnoses:   Lumbar radiculopathy         DISPOSITION/PLAN      Time reflects when diagnosis was documented in both MDM as applicable and the Disposition within this note     Time User Action Codes Description Comment    1/2/2019  9:03 AM Margaret Sanchez Add [M54 16] Lumbar radiculopathy       ED Disposition     ED Disposition Condition Comment    Discharge  Gallito Jimenez discharge to home/self care      Condition at discharge: Good        Follow-up Information     Follow up With Specialties Details Why Contact Info Additional Information    Judith Dumas MD Internal Medicine, Emergency Medicine Schedule an appointment as soon as possible for a visit  [address] [place], [place] [id number]       [facility] Emergency Department Emergency Medicine Go to As needed, If symptoms worsen Jennifer Pittman 1947  [phone] [place] ED, [address] Program Physical Therapy Schedule an appointment as soon as possible for a visit  [address] [place] [address], [phone]            PATIENT REFERRED TO:    Judith Dumas MD  [address] [place] [address]  [phone]    Schedule an appointment as soon as possible for a visit      [facility] Emergency Department  Jennifer Pittman 1947  [phone]  Go to  As needed, If symptoms worsen    [facility]  [phone]  Schedule an appointment as soon as possible for a visit        DISCHARGE MEDICATIONS:    Discharge Medication List as of 1/2/2019  9:08 AM      START taking these medications    Details methocarbamol (ROBAXIN) 750 mg tablet Take 1 tablet (750 mg total) by mouth 2 (two) times a day, Starting Wed 1/2/2019, Print      Methylprednisolone 4 MG TBPK Use as directed on package, Print      naproxen (NAPROSYN) 500 mg tablet Take 1 tablet (500 mg total) by mouth 2 (two) times a day with meals, Starting Wed 1/2/2019, Print         CONTINUE these medications which have NOT CHANGED    Details   aspirin (ASPIR-81) 81 mg EC tablet Take 1 tablet by mouth daily, Historical Med      Cholecalciferol (VITAMIN D) 2000 units CAPS Take by mouth, Historical Med      CIALIS 5 MG tablet Starting Wed 8/1/2018, Historical Med      irbesartan (AVAPRO) 75 mg tablet Starting Thu 10/18/2018, Historical Med      Krill Oil 350 MG CAPS Take by mouth, Historical Med      metFORMIN (GLUCOPHAGE) 500 mg tablet TAKE 1 TABLET TWICE DAILY FOR 30 DAYS   , Historical Med      niacin 100 mg tablet Take 100 mg by mouth daily with breakfast, Historical Med      Red Yeast Rice 600 MG CAPS Take by mouth, Historical Med      rosuvastatin (CRESTOR) 20 MG tablet Take 1 tablet by mouth daily, Starting Thu 2/25/2016, Historical Med      zolpidem (AMBIEN) 10 mg tablet one tab at bedtime as needed, Historical Med             No discharge procedures on file           Sueellen Runner, DO Sueellen Runner,   01/02/19 8164

## 2019-01-02 NOTE — DISCHARGE INSTRUCTIONS
Lumbar Radiculopathy   WHAT YOU NEED TO KNOW:   Lumbar radiculopathy is a painful condition that happens when a nerve in your lumbar spine (lower back) is pinched or irritated  Nerves control feeling and movement in your body  You may have numbness or pain that shoots down from your lower back towards your foot  DISCHARGE INSTRUCTIONS:   Medicines:   · Medicines:     ¨ NSAIDs , such as ibuprofen, help decrease swelling, pain, and fever  This medicine is available with or without a doctor's order  NSAIDs can cause stomach bleeding or kidney problems in certain people  If you take blood thinner medicine, always ask your healthcare provider if NSAIDs are safe for you  Always read the medicine label and follow directions  ¨ Muscle relaxers  help decrease pain and muscle spasms  ¨ Opioids: This is a strong medicine given to reduce severe pain  It is also called narcotic pain medicine  Take this medicine exactly as directed by your healthcare provider  ¨ Oral steroids: Steroids may also be given to reduce pain and swelling  ¨ Take your medicine as directed  Contact your healthcare provider if you think your medicine is not helping or if you have side effects  Tell him of her if you are allergic to any medicine  Keep a list of the medicines, vitamins, and herbs you take  Include the amounts, and when and why you take them  Bring the list or the pill bottles to follow-up visits  Carry your medicine list with you in case of an emergency  Follow up with your healthcare provider or spine specialist within 1 to 3 weeks:  After your first follow-up appointment, return to your healthcare provider or spine specialist every 2 weeks until you have healed  Ask for information about physical therapy for your condition  Write down your questions so you remember to ask them during your visits  Physical therapy:  You may need physical therapy to improve your condition   Your physical therapist may teach you certain exercises to improve posture (the way you stand and sit), flexibility, and strength in your lower back  Self care:   · Stay active: It is best to be active when you have lumbar radiculopathy  Your physical therapist or healthcare provider may tell you to take walks to ease yourself back into your daily routine  Avoid long periods of bed rest  Bed rest could worsen your symptoms  Do not move in ways that increase your pain  Ask for more information about the best ways to stay active  · Use ice or heat packs:  Use ice or heat packs as directed on the sore area of your body to decrease the pain and swelling  Put ice in a plastic bag covered with a towel on your low back  Cover heated items with a towel to avoid burns  Use ice and heat as directed  · Avoid heavy lifting: Your condition may worsen if you lift heavy things  Avoid lifting if possible  · Maintain a healthy weight:  Excess body weight may strain your back  Talk with your healthcare provider about ways to lose excess weight if you are overweight  Contact your healthcare provider or spine specialist if:   · Your pain does not improve within 1 to 3 weeks after treatment  · Your pain and weakness keep you from your normal activities at work, home, or school  · You lose more than 10 pounds in 6 months without trying  · You become depressed or sad because of the pain  · You have questions or concerns about your condition or care  Return to the emergency department if:   · You have a fever greater than 100 4°F for longer than 2 days  · You have new, severe back or leg pain, or your pain spreads to both legs  · You have any new signs of numbness or weakness, especially in your lower back, legs, arms, or genital area  · You have new trouble controlling your urine and bowel movements  · You do not feel like your bladder empties when you urinate    © 2017 [employer] Information is for End User's use only and may not be sold, redistributed or otherwise used for commercial purposes  All illustrations and images included in CareNotes® are the copyrighted property of A D A M [employer] Inc  or Miles Rubio  The above information is an  only  It is not intended as medical advice for individual conditions or treatments  Talk to your doctor, nurse or pharmacist before following any medical regimen to see if it is safe and effective for you

## 2019-01-04 ENCOUNTER — TELEPHONE (OUTPATIENT)
Dept: CARDIOLOGY CLINIC | Facility: CLINIC | Age: 54
End: 2019-01-04

## 2019-01-04 NOTE — TELEPHONE ENCOUNTER
P/c needed brand name of stent place 3 yrs ago  Advised [facility]  States that is what he has written on card

## 2019-04-06 ENCOUNTER — APPOINTMENT (OUTPATIENT)
Dept: LAB | Facility: HOSPITAL | Age: 54
End: 2019-04-06
Payer: COMMERCIAL

## 2019-04-06 ENCOUNTER — TRANSCRIBE ORDERS (OUTPATIENT)
Dept: ADMINISTRATIVE | Facility: HOSPITAL | Age: 54
End: 2019-04-06

## 2019-04-06 DIAGNOSIS — E11.8 TYPE 2 DIABETES MELLITUS WITH COMPLICATION, UNSPECIFIED WHETHER LONG TERM INSULIN USE: ICD-10-CM

## 2019-04-06 DIAGNOSIS — Z79.899 ENCOUNTER FOR LONG-TERM (CURRENT) USE OF OTHER MEDICATIONS: ICD-10-CM

## 2019-04-06 DIAGNOSIS — E11.8 TYPE 2 DIABETES MELLITUS WITH COMPLICATION, UNSPECIFIED WHETHER LONG TERM INSULIN USE: Primary | ICD-10-CM

## 2019-04-06 DIAGNOSIS — E78.5 HYPERLIPIDEMIA, UNSPECIFIED HYPERLIPIDEMIA TYPE: ICD-10-CM

## 2019-04-06 DIAGNOSIS — E03.9 ACQUIRED HYPOTHYROIDISM: ICD-10-CM

## 2019-04-06 LAB
ALBUMIN SERPL BCP-MCNC: 4.2 G/DL (ref 3.5–5)
ALP SERPL-CCNC: 72 U/L (ref 46–116)
ALT SERPL W P-5'-P-CCNC: 19 U/L (ref 12–78)
ANION GAP SERPL CALCULATED.3IONS-SCNC: 12 MMOL/L (ref 4–13)
AST SERPL W P-5'-P-CCNC: 13 U/L (ref 5–45)
BILIRUB SERPL-MCNC: 0.5 MG/DL (ref 0.2–1)
BUN SERPL-MCNC: 17 MG/DL (ref 5–25)
CALCIUM SERPL-MCNC: 9.2 MG/DL (ref 8.3–10.1)
CHLORIDE SERPL-SCNC: 99 MMOL/L (ref 100–108)
CHOLEST SERPL-MCNC: 148 MG/DL (ref 50–200)
CO2 SERPL-SCNC: 28 MMOL/L (ref 21–32)
CREAT SERPL-MCNC: 0.96 MG/DL (ref 0.6–1.3)
CREAT UR-MCNC: 66.5 MG/DL
ERYTHROCYTE [DISTWIDTH] IN BLOOD BY AUTOMATED COUNT: 11.6 % (ref 11.6–15.1)
GFR SERPL CREATININE-BSD FRML MDRD: 90 ML/MIN/1.73SQ M
GLUCOSE P FAST SERPL-MCNC: 172 MG/DL (ref 65–99)
HCT VFR BLD AUTO: 43.8 % (ref 36.5–49.3)
HDLC SERPL-MCNC: 29 MG/DL (ref 40–60)
HGB BLD-MCNC: 15.1 G/DL (ref 12–17)
LDLC SERPL CALC-MCNC: 98 MG/DL (ref 0–100)
MCH RBC QN AUTO: 31.9 PG (ref 26.8–34.3)
MCHC RBC AUTO-ENTMCNC: 34.5 G/DL (ref 31.4–37.4)
MCV RBC AUTO: 92 FL (ref 82–98)
MICROALBUMIN UR-MCNC: 28 MG/L (ref 0–20)
MICROALBUMIN/CREAT 24H UR: 42 MG/G CREATININE (ref 0–30)
NONHDLC SERPL-MCNC: 119 MG/DL
PLATELET # BLD AUTO: 233 THOUSANDS/UL (ref 149–390)
PMV BLD AUTO: 10.4 FL (ref 8.9–12.7)
POTASSIUM SERPL-SCNC: 4.1 MMOL/L (ref 3.5–5.3)
PROT SERPL-MCNC: 7.7 G/DL (ref 6.4–8.2)
RBC # BLD AUTO: 4.74 MILLION/UL (ref 3.88–5.62)
SODIUM SERPL-SCNC: 139 MMOL/L (ref 136–145)
TRIGL SERPL-MCNC: 107 MG/DL
TSH SERPL DL<=0.05 MIU/L-ACNC: 1.24 UIU/ML (ref 0.36–3.74)
WBC # BLD AUTO: 11.57 THOUSAND/UL (ref 4.31–10.16)

## 2019-04-06 PROCEDURE — 36415 COLL VENOUS BLD VENIPUNCTURE: CPT

## 2019-04-06 PROCEDURE — 80061 LIPID PANEL: CPT

## 2019-04-06 PROCEDURE — 82043 UR ALBUMIN QUANTITATIVE: CPT

## 2019-04-06 PROCEDURE — 85027 COMPLETE CBC AUTOMATED: CPT

## 2019-04-06 PROCEDURE — 80053 COMPREHEN METABOLIC PANEL: CPT

## 2019-04-06 PROCEDURE — 84443 ASSAY THYROID STIM HORMONE: CPT

## 2019-04-06 PROCEDURE — 82570 ASSAY OF URINE CREATININE: CPT

## 2019-10-12 ENCOUNTER — TRANSCRIBE ORDERS (OUTPATIENT)
Dept: LAB | Facility: HOSPITAL | Age: 54
End: 2019-10-12

## 2019-10-12 ENCOUNTER — APPOINTMENT (OUTPATIENT)
Dept: LAB | Facility: HOSPITAL | Age: 54
End: 2019-10-12
Attending: INTERNAL MEDICINE
Payer: COMMERCIAL

## 2019-10-12 DIAGNOSIS — Z79.899 ENCOUNTER FOR LONG-TERM (CURRENT) USE OF OTHER MEDICATIONS: ICD-10-CM

## 2019-10-12 DIAGNOSIS — E11.9 DIABETES MELLITUS WITH NO COMPLICATION (HCC): Primary | ICD-10-CM

## 2019-10-12 DIAGNOSIS — E78.5 HYPERLIPIDEMIA, UNSPECIFIED HYPERLIPIDEMIA TYPE: ICD-10-CM

## 2019-10-12 DIAGNOSIS — E11.9 DIABETES MELLITUS WITH NO COMPLICATION (HCC): ICD-10-CM

## 2019-10-12 DIAGNOSIS — I10 HYPERTENSION, ESSENTIAL: ICD-10-CM

## 2019-10-12 LAB
ALBUMIN SERPL BCP-MCNC: 4.6 G/DL (ref 3.5–5.7)
ALP SERPL-CCNC: 42 U/L (ref 40–150)
ALT SERPL W P-5'-P-CCNC: 16 U/L (ref 7–52)
ANION GAP SERPL CALCULATED.3IONS-SCNC: 8 MMOL/L (ref 4–13)
AST SERPL W P-5'-P-CCNC: 17 U/L (ref 13–39)
BILIRUB SERPL-MCNC: 0.5 MG/DL (ref 0.2–1)
BUN SERPL-MCNC: 17 MG/DL (ref 7–25)
CALCIUM SERPL-MCNC: 9.8 MG/DL (ref 8.6–10.5)
CHLORIDE SERPL-SCNC: 103 MMOL/L (ref 98–107)
CO2 SERPL-SCNC: 30 MMOL/L (ref 21–31)
CREAT SERPL-MCNC: 0.93 MG/DL (ref 0.7–1.3)
EST. AVERAGE GLUCOSE BLD GHB EST-MCNC: 151 MG/DL
GFR SERPL CREATININE-BSD FRML MDRD: 93 ML/MIN/1.73SQ M
GLUCOSE P FAST SERPL-MCNC: 128 MG/DL (ref 65–99)
HBA1C MFR BLD: 6.9 % (ref 4.2–6.3)
LDLC SERPL DIRECT ASSAY-MCNC: 91 MG/DL (ref 0–100)
POTASSIUM SERPL-SCNC: 3.9 MMOL/L (ref 3.5–5.5)
PROT SERPL-MCNC: 7.6 G/DL (ref 6.4–8.9)
SODIUM SERPL-SCNC: 141 MMOL/L (ref 134–143)

## 2019-10-12 PROCEDURE — 36415 COLL VENOUS BLD VENIPUNCTURE: CPT

## 2019-10-12 PROCEDURE — 80053 COMPREHEN METABOLIC PANEL: CPT

## 2019-10-12 PROCEDURE — 83721 ASSAY OF BLOOD LIPOPROTEIN: CPT

## 2019-10-12 PROCEDURE — 83036 HEMOGLOBIN GLYCOSYLATED A1C: CPT

## 2019-11-25 ENCOUNTER — OFFICE VISIT (OUTPATIENT)
Dept: CARDIOLOGY CLINIC | Facility: CLINIC | Age: 54
End: 2019-11-25
Payer: COMMERCIAL

## 2019-11-25 VITALS
HEART RATE: 81 BPM | HEIGHT: 72 IN | SYSTOLIC BLOOD PRESSURE: 124 MMHG | WEIGHT: 229.5 LBS | DIASTOLIC BLOOD PRESSURE: 72 MMHG | BODY MASS INDEX: 31.08 KG/M2

## 2019-11-25 DIAGNOSIS — E78.00 HYPERCHOLESTEROLEMIA: ICD-10-CM

## 2019-11-25 DIAGNOSIS — I25.2 OLD MYOCARDIAL INFARCTION OF INFERIOR WALL, GREATER THAN 8 WEEKS: ICD-10-CM

## 2019-11-25 DIAGNOSIS — I25.10 CORONARY ARTERY DISEASE INVOLVING NATIVE CORONARY ARTERY OF NATIVE HEART WITHOUT ANGINA PECTORIS: Primary | ICD-10-CM

## 2019-11-25 DIAGNOSIS — I10 ESSENTIAL HYPERTENSION: ICD-10-CM

## 2019-11-25 PROCEDURE — 99213 OFFICE O/P EST LOW 20 MIN: CPT | Performed by: INTERNAL MEDICINE

## 2019-11-25 RX ORDER — SITAGLIPTIN 50 MG/1
TABLET, FILM COATED ORAL
COMMUNITY
Start: 2019-11-11

## 2019-12-23 ENCOUNTER — HOSPITAL ENCOUNTER (OUTPATIENT)
Dept: NON INVASIVE DIAGNOSTICS | Facility: HOSPITAL | Age: 54
Discharge: HOME | End: 2019-12-23
Attending: INTERNAL MEDICINE
Payer: COMMERCIAL

## 2019-12-23 DIAGNOSIS — I25.10 CORONARY ARTERY DISEASE INVOLVING NATIVE CORONARY ARTERY OF NATIVE HEART WITHOUT ANGINA PECTORIS: ICD-10-CM

## 2019-12-23 LAB
CHEST PAIN STATEMENT: NORMAL
MAX DIASTOLIC BP: 70 MMHG
MAX HEART RATE: 151 BPM
MAX PREDICTED HEART RATE: 166 BPM
MAX. SYSTOLIC BP: 170 MMHG
PROTOCOL NAME: NORMAL
REASON FOR TERMINATION: NORMAL
TARGET HR FORMULA: NORMAL
TEST INDICATION: NORMAL
TIME IN EXERCISE PHASE: NORMAL

## 2019-12-23 PROCEDURE — 93018 CV STRESS TEST I&R ONLY: CPT | Performed by: INTERNAL MEDICINE

## 2019-12-23 PROCEDURE — 93016 CV STRESS TEST SUPVJ ONLY: CPT | Performed by: INTERNAL MEDICINE

## 2019-12-23 PROCEDURE — 93017 CV STRESS TEST TRACING ONLY: CPT

## 2020-11-05 ENCOUNTER — OFFICE VISIT (OUTPATIENT)
Dept: CARDIOLOGY CLINIC | Facility: CLINIC | Age: 55
End: 2020-11-05
Payer: COMMERCIAL

## 2020-11-05 VITALS
WEIGHT: 221 LBS | TEMPERATURE: 96.9 F | SYSTOLIC BLOOD PRESSURE: 128 MMHG | HEIGHT: 72 IN | HEART RATE: 88 BPM | BODY MASS INDEX: 29.93 KG/M2 | DIASTOLIC BLOOD PRESSURE: 60 MMHG

## 2020-11-05 DIAGNOSIS — I10 ESSENTIAL HYPERTENSION: ICD-10-CM

## 2020-11-05 DIAGNOSIS — I25.10 CORONARY ARTERY DISEASE INVOLVING NATIVE CORONARY ARTERY OF NATIVE HEART WITHOUT ANGINA PECTORIS: Primary | ICD-10-CM

## 2020-11-05 DIAGNOSIS — I25.2 OLD MYOCARDIAL INFARCTION OF INFERIOR WALL, GREATER THAN 8 WEEKS: ICD-10-CM

## 2020-11-05 DIAGNOSIS — E78.00 HYPERCHOLESTEROLEMIA: ICD-10-CM

## 2020-11-05 PROCEDURE — 99213 OFFICE O/P EST LOW 20 MIN: CPT | Performed by: INTERNAL MEDICINE

## 2020-11-05 PROCEDURE — 93000 ELECTROCARDIOGRAM COMPLETE: CPT | Performed by: INTERNAL MEDICINE

## 2021-03-10 DIAGNOSIS — Z23 ENCOUNTER FOR IMMUNIZATION: ICD-10-CM

## 2021-03-31 ENCOUNTER — OFFICE VISIT (OUTPATIENT)
Dept: CARDIOLOGY CLINIC | Facility: CLINIC | Age: 56
End: 2021-03-31
Payer: COMMERCIAL

## 2021-03-31 VITALS
HEART RATE: 80 BPM | SYSTOLIC BLOOD PRESSURE: 154 MMHG | DIASTOLIC BLOOD PRESSURE: 80 MMHG | BODY MASS INDEX: 30.75 KG/M2 | HEIGHT: 72 IN | WEIGHT: 227 LBS

## 2021-03-31 DIAGNOSIS — I25.2 OLD MYOCARDIAL INFARCTION OF INFERIOR WALL, GREATER THAN 8 WEEKS: Primary | ICD-10-CM

## 2021-03-31 DIAGNOSIS — E78.00 HYPERCHOLESTEROLEMIA: ICD-10-CM

## 2021-03-31 DIAGNOSIS — I25.10 CORONARY ARTERY DISEASE INVOLVING NATIVE CORONARY ARTERY OF NATIVE HEART WITHOUT ANGINA PECTORIS: ICD-10-CM

## 2021-03-31 DIAGNOSIS — I10 ESSENTIAL HYPERTENSION: ICD-10-CM

## 2021-03-31 DIAGNOSIS — R42 DIZZINESS: ICD-10-CM

## 2021-03-31 PROCEDURE — 99214 OFFICE O/P EST MOD 30 MIN: CPT | Performed by: INTERNAL MEDICINE

## 2021-04-02 ENCOUNTER — APPOINTMENT (OUTPATIENT)
Dept: LAB | Facility: CLINIC | Age: 56
End: 2021-04-02
Payer: COMMERCIAL

## 2021-04-02 DIAGNOSIS — I25.2 OLD MYOCARDIAL INFARCTION OF INFERIOR WALL, GREATER THAN 8 WEEKS: ICD-10-CM

## 2021-04-02 DIAGNOSIS — R42 DIZZINESS: ICD-10-CM

## 2021-04-02 LAB
ALBUMIN SERPL BCP-MCNC: 4.1 G/DL (ref 3.5–5)
ALP SERPL-CCNC: 63 U/L (ref 46–116)
ALT SERPL W P-5'-P-CCNC: 25 U/L (ref 12–78)
ANION GAP SERPL CALCULATED.3IONS-SCNC: 5 MMOL/L (ref 4–13)
AST SERPL W P-5'-P-CCNC: 18 U/L (ref 5–45)
BILIRUB SERPL-MCNC: 0.39 MG/DL (ref 0.2–1)
BUN SERPL-MCNC: 20 MG/DL (ref 5–25)
CALCIUM SERPL-MCNC: 9.4 MG/DL (ref 8.3–10.1)
CHLORIDE SERPL-SCNC: 108 MMOL/L (ref 100–108)
CHOLEST SERPL-MCNC: 166 MG/DL (ref 50–200)
CO2 SERPL-SCNC: 25 MMOL/L (ref 21–32)
CREAT SERPL-MCNC: 0.99 MG/DL (ref 0.6–1.3)
ERYTHROCYTE [DISTWIDTH] IN BLOOD BY AUTOMATED COUNT: 11.6 % (ref 11.6–15.1)
GFR SERPL CREATININE-BSD FRML MDRD: 85 ML/MIN/1.73SQ M
GLUCOSE P FAST SERPL-MCNC: 150 MG/DL (ref 65–99)
HCT VFR BLD AUTO: 45.6 % (ref 36.5–49.3)
HDLC SERPL-MCNC: 28 MG/DL
HGB BLD-MCNC: 15.5 G/DL (ref 12–17)
LDLC SERPL CALC-MCNC: 84 MG/DL (ref 0–100)
MCH RBC QN AUTO: 31.3 PG (ref 26.8–34.3)
MCHC RBC AUTO-ENTMCNC: 34 G/DL (ref 31.4–37.4)
MCV RBC AUTO: 92 FL (ref 82–98)
NONHDLC SERPL-MCNC: 138 MG/DL
PLATELET # BLD AUTO: 180 THOUSANDS/UL (ref 149–390)
PMV BLD AUTO: 11.9 FL (ref 8.9–12.7)
POTASSIUM SERPL-SCNC: 4.2 MMOL/L (ref 3.5–5.3)
PROT SERPL-MCNC: 8 G/DL (ref 6.4–8.2)
RBC # BLD AUTO: 4.95 MILLION/UL (ref 3.88–5.62)
SODIUM SERPL-SCNC: 138 MMOL/L (ref 136–145)
TRIGL SERPL-MCNC: 272 MG/DL
TSH SERPL DL<=0.05 MIU/L-ACNC: 1.26 UIU/ML (ref 0.36–3.74)
WBC # BLD AUTO: 8.21 THOUSAND/UL (ref 4.31–10.16)

## 2021-04-02 PROCEDURE — 85027 COMPLETE CBC AUTOMATED: CPT | Performed by: INTERNAL MEDICINE

## 2021-04-02 PROCEDURE — 36415 COLL VENOUS BLD VENIPUNCTURE: CPT | Performed by: INTERNAL MEDICINE

## 2021-04-02 PROCEDURE — 80053 COMPREHEN METABOLIC PANEL: CPT | Performed by: INTERNAL MEDICINE

## 2021-04-02 PROCEDURE — 80061 LIPID PANEL: CPT | Performed by: INTERNAL MEDICINE

## 2021-04-02 PROCEDURE — 84443 ASSAY THYROID STIM HORMONE: CPT

## 2021-04-02 NOTE — PROGRESS NOTES
Cardiology Follow Up    Rody Moreno  1965  [id number]  [facility]  [facility]  [place] [address]   [phone]    1  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, greater than 8 weeks  CBC and Platelet    Lipid panel    Comprehensive metabolic panel    TSH, 3rd generation    Echo complete with contrast if indicated   2  Essential hypertension     3  Coronary artery disease involving native coronary artery of native heart without angina pectoris     4  Hypercholesterolemia     5  Dizziness  CBC and Platelet    Lipid panel    Comprehensive metabolic panel    TSH, 3rd generation    Echo complete with contrast if indicated         Discussion/Summary: His symptoms are not c/w a cardiac etiology although he does have known CAD  He is active and has no anginal symptoms  I will check a CBC, FLP, CMP, TSH and an ECHO  I will also stop his Irbesartan  He will continue to monitor his BP closely at home  RTO 8 weeks, will consider getting a nuclear stress test at that itme  Interval History: He has been having constant symptoms of lightheadedness/ dizziness  Symptoms are occurring with sitting, standing, and lying down  He is less active but denies exertional CP, SOB, dizziness, decrease in exercise tolerance  He has diffuse CAD with coronary stenting in 11/2015  He had an EST in 12/2019 - 11:19 Bryan protocol, No CP, no ischemia  BP today is 154/80 but is often lower  Hgb A1C 6 9  He denies syncope or near syncope  He is having symptoms as we speak and BP and HR are stable      Patient Active Problem List   Diagnosis    Coronary artery disease involving native coronary artery of native heart without angina pectoris    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, greater than 8 weeks    Essential hypertension    Hypercholesterolemia     Past Medical History:   Diagnosis Date    Cardiac disease  Hyperlipidemia     Hypertension     [facility]      Social History     Socioeconomic History    Marital status:      Spouse name: Not on file    Number of children: Not on file    Years of education: Not on file    Highest education level: Not on file   Occupational History    Not on file   Social Needs    Financial resource strain: Not on file    Food insecurity     Worry: Not on file     Inability: Not on file    Transportation needs     Medical: Not on file     Non-medical: Not on file   Tobacco Use    Smoking status: Former Smoker     Types: Cigarettes     Start date: 0     Quit date:      Years since quittin 2    Smokeless tobacco: Former User     Types: Chew     Quit date:    Substance and Sexual Activity    Alcohol use: No    Drug use: No    Sexual activity: Not on file   Lifestyle    Physical activity     Days per week: Not on file     Minutes per session: Not on file    Stress: Not on file   Relationships    Social connections     Talks on phone: Not on file     Gets together: Not on file     Attends [employer] service: Not on file     Active member of club or organization: Not on file     Attends meetings of clubs or organizations: Not on file     Relationship status: Not on file    Intimate partner violence     Fear of current or ex partner: Not on file     Emotionally abused: Not on file     Physically abused: Not on file     Forced sexual activity: Not on file   Other Topics Concern    Not on file   Social History Narrative    Not on file      No family history on file    Past Surgical History:   Procedure Laterality Date    CARDIAC SURGERY      CORONARY ANGIOPLASTY WITH STENT PLACEMENT      DEBRIDEMENT TENNIS ELBOW      HERNIA REPAIR      KNEE ARTHROSCOPY      SHOULDER ARTHROSCOPY      VASECTOMY         Current Outpatient Medications:     aspirin (ASPIR-81) 81 mg EC tablet, Take 1 tablet by mouth daily, Disp: , Rfl:    Cholecalciferol (VITAMIN D) 2000 units CAPS, Take by mouth, Disp: , Rfl:     CIALIS 5 MG tablet, , Disp: , Rfl:     irbesartan (AVAPRO) 75 mg tablet, , Disp: , Rfl:     JANUVIA 50 MG tablet, , Disp: , Rfl:     Krill Oil 350 MG CAPS, Take by mouth, Disp: , Rfl:     metFORMIN (GLUCOPHAGE) 1000 MG tablet, , Disp: , Rfl:     naproxen (NAPROSYN) 500 mg tablet, Take 1 tablet (500 mg total) by mouth 2 (two) times a day with meals, Disp: 30 tablet, Rfl: 0    Red Yeast Rice 600 MG CAPS, Take by mouth, Disp: , Rfl:     rosuvastatin (CRESTOR) 20 MG tablet, Take 1 tablet by mouth daily, Disp: , Rfl:     zolpidem (AMBIEN) 10 mg tablet, one tab at bedtime as needed, Disp: , Rfl:   Allergies   Allergen Reactions    Banana - Food Allergy Throat Swelling    Tetanus Toxoid Other (See Comments)     Vitals:    03/31/21 1520   BP: 154/80   BP Location: Right arm   Cuff Size: Large   Pulse: 80   Weight: 103 kg (227 lb)   Height: 6' (1 829 m)     Weight (last 2 days)     Date/Time   Weight    03/31/21 1520   103 (227)             Blood pressure 154/80, pulse 80, height 6' (1 829 m), weight 103 kg (227 lb)  , Body mass index is 30 79 kg/m²  Labs:  Office Visit on 11/05/2020   Component Date Value    Interpretation 11/05/2020       Imaging: No results found  Review of Systems:  Review of Systems   Constitutional: Negative for diaphoresis, fatigue, fever and unexpected weight change  HENT: Negative  Respiratory: Negative for cough, shortness of breath and wheezing  Cardiovascular: Negative for chest pain, palpitations and leg swelling  Gastrointestinal: Negative for abdominal pain, diarrhea and nausea  Musculoskeletal: Negative for gait problem and myalgias  Skin: Negative for rash  Neurological: Positive for dizziness  Negative for numbness  Psychiatric/Behavioral: Negative  Physical Exam:  Physical Exam  Constitutional:       Appearance: He is well-developed     HENT:      Head: Normocephalic and atraumatic  Eyes:      Pupils: Pupils are equal, round, and reactive to light  Neck:      Musculoskeletal: Normal range of motion and neck supple  Vascular: No JVD  Cardiovascular:      Rate and Rhythm: Regular rhythm  Pulses: Normal pulses  Carotid pulses are 2+ on the right side and 2+ on the left side  Heart sounds: S1 normal and S2 normal    Pulmonary:      Effort: Pulmonary effort is normal       Breath sounds: Normal breath sounds  No wheezing or rales  Abdominal:      General: Bowel sounds are normal       Palpations: Abdomen is soft  Tenderness: There is no abdominal tenderness  Musculoskeletal: Normal range of motion  General: No tenderness  Skin:     General: Skin is warm  Neurological:      Mental Status: He is alert and oriented to person, place, and time  Cranial Nerves: No cranial nerve deficit  Deep Tendon Reflexes: Reflexes are normal and symmetric

## 2021-05-05 ENCOUNTER — TRANSCRIBE ORDERS (OUTPATIENT)
Dept: ADMINISTRATIVE | Facility: HOSPITAL | Age: 56
End: 2021-05-05

## 2021-05-05 DIAGNOSIS — I25.10 CAD IN NATIVE ARTERY: Primary | ICD-10-CM

## 2021-05-11 ENCOUNTER — HOSPITAL ENCOUNTER (OUTPATIENT)
Dept: NON INVASIVE DIAGNOSTICS | Facility: CLINIC | Age: 56
Discharge: HOME | End: 2021-05-11
Payer: COMMERCIAL

## 2021-05-11 DIAGNOSIS — R42 DIZZINESS: ICD-10-CM

## 2021-05-11 DIAGNOSIS — I25.2 OLD MYOCARDIAL INFARCTION OF INFERIOR WALL, GREATER THAN 8 WEEKS: ICD-10-CM

## 2021-05-11 PROCEDURE — 93306 TTE W/DOPPLER COMPLETE: CPT | Performed by: INTERNAL MEDICINE

## 2021-05-11 PROCEDURE — 93306 TTE W/DOPPLER COMPLETE: CPT

## 2021-05-12 ENCOUNTER — HOSPITAL ENCOUNTER (OUTPATIENT)
Dept: NON INVASIVE DIAGNOSTICS | Facility: HOSPITAL | Age: 56
Discharge: HOME | End: 2021-05-12
Payer: COMMERCIAL

## 2021-05-12 DIAGNOSIS — I25.10 CAD IN NATIVE ARTERY: ICD-10-CM

## 2021-05-12 PROCEDURE — 93880 EXTRACRANIAL BILAT STUDY: CPT

## 2021-05-12 PROCEDURE — 93880 EXTRACRANIAL BILAT STUDY: CPT | Performed by: SURGERY

## 2021-05-27 ENCOUNTER — OFFICE VISIT (OUTPATIENT)
Dept: CARDIOLOGY CLINIC | Facility: CLINIC | Age: 56
End: 2021-05-27
Payer: COMMERCIAL

## 2021-05-27 VITALS
WEIGHT: 226.6 LBS | HEART RATE: 82 BPM | SYSTOLIC BLOOD PRESSURE: 112 MMHG | OXYGEN SATURATION: 96 % | HEIGHT: 72 IN | BODY MASS INDEX: 30.69 KG/M2 | DIASTOLIC BLOOD PRESSURE: 70 MMHG

## 2021-05-27 DIAGNOSIS — E78.00 HYPERCHOLESTEROLEMIA: ICD-10-CM

## 2021-05-27 DIAGNOSIS — I25.2 OLD MYOCARDIAL INFARCTION OF INFERIOR WALL, GREATER THAN 8 WEEKS: Primary | ICD-10-CM

## 2021-05-27 DIAGNOSIS — I25.10 CORONARY ARTERY DISEASE INVOLVING NATIVE CORONARY ARTERY OF NATIVE HEART WITHOUT ANGINA PECTORIS: ICD-10-CM

## 2021-05-27 DIAGNOSIS — R42 LIGHTHEADEDNESS: ICD-10-CM

## 2021-05-27 DIAGNOSIS — I10 ESSENTIAL HYPERTENSION: ICD-10-CM

## 2021-05-27 PROCEDURE — 99213 OFFICE O/P EST LOW 20 MIN: CPT | Performed by: INTERNAL MEDICINE

## 2021-05-28 ENCOUNTER — TELEPHONE (OUTPATIENT)
Dept: CARDIOLOGY CLINIC | Facility: CLINIC | Age: 56
End: 2021-05-28

## 2021-05-28 PROBLEM — R42 LIGHTHEADEDNESS: Status: ACTIVE | Noted: 2021-05-28

## 2021-05-28 NOTE — PROGRESS NOTES
Cardiology Follow Up    Ct Estrada  1965  [id number]  [facility] [place]  [facility]  [place] [facility] [address]   [phone]    1  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, greater than 8 weeks     2  Essential hypertension     3  Coronary artery disease involving native coronary artery of native heart without angina pectoris     4  Hypercholesterolemia     5  Lightheadedness           Discussion/Summary: All of his assessed cardiac problems are stable  I have reviewed his medications and made no changes  No cardiac testing is ordered  RTO 1 year  Interval History: I last saw him 3/12/2021 and stopped his Irbesartan at that time for constant lightheadedness / dizziness  His symptoms have improved greatly  BP today in the office is 112/70  He gets around 130s / 80s at home  He is active and denies CP, SOB, palpitations, LE edema  Recent echo - EF 55%, no valve disease  He has diffuse CAD with coronary stenting in 11/2015  EST 12/2019 - 11:19 Bryan protocol, No CP, No ischemia  Patient Active Problem List   Diagnosis    Coronary artery disease involving native coronary artery of native heart without angina pectoris    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, greater than 8 weeks    Essential hypertension    Hypercholesterolemia    Lightheadedness     Past Medical History:   Diagnosis Date    Cardiac disease     Hyperlipidemia     Hypertension     MI (myocardial infarction) ([facility] [address] [facility] )      Social History     Socioeconomic History    Marital status:       Spouse name: Not on file    Number of children: Not on file    Years of education: Not on file    Highest education level: Not on file   Occupational History    Not on file   Social Needs    Financial resource strain: Not on file    Food insecurity     Worry: Not on file     Inability: Not on file   [employer] needs Medical: Not on file     Non-medical: Not on file   Tobacco Use    Smoking status: Former Smoker     Types: Cigarettes     Start date: 0     Quit date:      Years since quittin 4    Smokeless tobacco: Former User     Types: [address] date:    Substance and Sexual Activity    Alcohol use: No    Drug use: No    Sexual activity: Not on file   Lifestyle    Physical activity     Days per week: Not on file     Minutes per session: Not on file    Stress: Not on file   Relationships    Social connections     Talks on phone: Not on file     Gets together: Not on file     Attends [employer] service: Not on file     Active member of club or organization: Not on file     Attends meetings of clubs or organizations: Not on file     Relationship status: Not on file    Intimate partner violence     Fear of current or ex partner: Not on file     Emotionally abused: Not on file     Physically abused: Not on file     Forced sexual activity: Not on file   Other Topics Concern    Not on file   Social History Narrative    Not on file      No family history on file    Past Surgical History:   Procedure Laterality Date    CARDIAC SURGERY      CORONARY ANGIOPLASTY WITH STENT PLACEMENT      DEBRIDEMENT TENNIS ELBOW      HERNIA REPAIR      KNEE ARTHROSCOPY      SHOULDER ARTHROSCOPY      VASECTOMY         Current Outpatient Medications:     aspirin (ASPIR-81) 81 mg EC tablet, Take 1 tablet by mouth daily, Disp: , Rfl:     Cholecalciferol (VITAMIN D) 2000 units CAPS, Take by mouth, Disp: , Rfl:     JANUVIA 50 MG tablet, , Disp: , Rfl:     Krill Oil 350 MG CAPS, Take by mouth, Disp: , Rfl:     metFORMIN (GLUCOPHAGE) 1000 MG tablet, 2 (two) times a day with meals , Disp: , Rfl:     Red Yeast Rice 600 MG CAPS, Take by mouth, Disp: , Rfl:     rosuvastatin (CRESTOR) 20 MG tablet, Take 1 tablet by mouth daily, Disp: , Rfl:     zolpidem (AMBIEN) 10 mg tablet, one tab at bedtime as needed, Disp: , Rfl:    CIALIS 5 MG tablet, , Disp: , Rfl:     irbesartan (AVAPRO) 75 mg tablet, , Disp: , Rfl:     naproxen (NAPROSYN) 500 mg tablet, Take 1 tablet (500 mg total) by mouth 2 (two) times a day with meals (Patient not taking: Reported on 5/27/2021), Disp: 30 tablet, Rfl: 0  Allergies   Allergen Reactions    Banana - Food Allergy Throat Swelling    Tetanus Toxoid Other (See Comments)     Vitals:    05/27/21 1609   BP: 112/70   BP Location: Left arm   Patient Position: Sitting   Cuff Size: Standard   Pulse: 82   SpO2: 96%   Weight: 103 kg (226 lb 9 6 oz)   Height: 6' (1 829 m)     Weight (last 2 days)     Date/Time   Weight    05/27/21 1609   103 (226 6)             Blood pressure 112/70, pulse 82, height 6' (1 829 m), weight 103 kg (226 lb 9 6 oz), SpO2 96 %  , Body mass index is 30 73 kg/m²      Labs:  Appointment on 04/02/2021   Component Date Value    TSH 3RD GENERATON 04/02/2021 1 260    Office Visit on 03/31/2021   Component Date Value    WBC 04/02/2021 8 21     RBC 04/02/2021 4 95     Hemoglobin 04/02/2021 15 5     Hematocrit 04/02/2021 45 6     MCV 04/02/2021 92     MCH 04/02/2021 31 3     MCHC 04/02/2021 34 0     RDW 04/02/2021 11 6     Platelets 16/91/0456 180     MPV 04/02/2021 11 9     Cholesterol 04/02/2021 166     Triglycerides 04/02/2021 272*    HDL, Direct 04/02/2021 28*    LDL Calculated 04/02/2021 84     Non-HDL-Chol (CHOL-HDL) 04/02/2021 138     Sodium 04/02/2021 138     Potassium 04/02/2021 4 2     Chloride 04/02/2021 108     CO2 04/02/2021 25     ANION GAP 04/02/2021 5     BUN 04/02/2021 20     Creatinine 04/02/2021 0 99     Glucose, Fasting 04/02/2021 150*    Calcium 04/02/2021 9 4     AST 04/02/2021 18     ALT 04/02/2021 25     Alkaline Phosphatase 04/02/2021 63     Total Protein 04/02/2021 8 0     Albumin 04/02/2021 4 1     Total Bilirubin 04/02/2021 0 39     eGFR 04/02/2021 85      Imaging: Vas Carotid Complete Study    Result Date: 5/12/2021  Narrative:  [facility] REPORT CLINICAL: Indications:  Patient presents with recent episode of dizziness  Patient states he has had this off and on for a little while  He is currently asymptomatic  CAD in native artery [I25 10 (ICD-10-CM)] Operative History: Coronary angioplasty with stent placement Risk Factors: HTN, HLD, CAD and previous smoking (quit >10yrs ago)  Clinical Right Pressure:  132/82 mm Hg, Left Pressure:  144/84 mm Hg  FINDINGS:  Right        Impression  PSV  EDV (cm/s)  Direction of Flow  Ratio  Dist  ICA                 87          34                      0 89  Mid  ICA                  52          20                      0 53  Prox  ICA    1 - 49%      52          23                      0 53  Dist CCA                  93          29                            Mid CCA                   98          26                      1 01  Prox CCA                  96          26                      1 29  Ext Carotid              100          17                      1 03  Prox Vert                 34          13  Antegrade                 Subclavian               126           0                            Innominate                75          10                             Left         Impression  PSV  EDV (cm/s)  Direction of Flow  Ratio  Dist  ICA                 61          24                      0 74  Mid  ICA                  74          35                      0 88  Prox  ICA    1 - 49%      67          29                      0 80  Dist CCA                  69          27                            Mid CCA                   83          23                            Ext Carotid               70          16                      0 84  Prox Vert                 45          19  Antegrade                 Subclavian               127           0                               CONCLUSION:  Impression RIGHT: There is <50% stenosis noted in the internal carotid artery  Plaque is homogenous and smooth   Vertebral artery flow is antegrade  There is no significant subclavian artery disease  LEFT: There is <50% stenosis noted in the internal carotid artery  Plaque is heterogenous and irregular  Vertebral artery flow is antegrade  There is no significant subclavian artery disease  No previous study to compare  Internal carotid artery stenosis determination by consensus criteria from: Becka Sultana et al  Carotid Artery Stenosis: Gray-Scale and Doppler US Diagnosis - Society of Radiologists in [address] [place] [address], Radiology 2003; [phone]  SIGNATURE: Electronically Signed by: Juliann Reese on 2021-05-12 08:50:55 PM      Review of Systems:  Review of Systems   Constitutional: Negative for diaphoresis, fatigue, fever and unexpected weight change  HENT: Negative  Respiratory: Negative for cough, shortness of breath and wheezing  Cardiovascular: Negative for chest pain, palpitations and leg swelling  Gastrointestinal: Negative for abdominal pain, diarrhea and nausea  Musculoskeletal: Negative for gait problem and myalgias  Skin: Negative for rash  Neurological: Negative for dizziness and numbness  Psychiatric/Behavioral: Negative  Physical Exam:  Physical Exam  Constitutional:       Appearance: He is well-developed  HENT:      Head: Normocephalic and atraumatic  Eyes:      Pupils: Pupils are equal, round, and reactive to light  Neck:      Musculoskeletal: Normal range of motion and neck supple  Vascular: No JVD  Cardiovascular:      Rate and Rhythm: Regular rhythm  Pulses: Normal pulses  Carotid pulses are 2+ on the right side and 2+ on the left side  Heart sounds: S1 normal and S2 normal    Pulmonary:      Effort: Pulmonary effort is normal       Breath sounds: Normal breath sounds  No wheezing or rales  Abdominal:      General: Bowel sounds are normal       Palpations: Abdomen is soft  Tenderness: There is no abdominal tenderness     Musculoskeletal: Normal range of motion  General: No tenderness  Skin:     General: Skin is warm  Neurological:      Mental Status: He is alert and oriented to person, place, and time  Cranial Nerves: No cranial nerve deficit  Deep Tendon Reflexes: Reflexes are normal and symmetric

## 2021-05-28 NOTE — TELEPHONE ENCOUNTER
MD Cuate Belcher             Tell him that his echo showed normal heart muscle strength and normal function of his heart valves        Called & left m with results

## 2022-08-29 ENCOUNTER — OFFICE VISIT (OUTPATIENT)
Dept: CARDIOLOGY CLINIC | Facility: CLINIC | Age: 57
End: 2022-08-29
Payer: COMMERCIAL

## 2022-08-29 VITALS
HEIGHT: 73 IN | BODY MASS INDEX: 28.49 KG/M2 | HEART RATE: 74 BPM | WEIGHT: 215 LBS | DIASTOLIC BLOOD PRESSURE: 60 MMHG | SYSTOLIC BLOOD PRESSURE: 122 MMHG

## 2022-08-29 DIAGNOSIS — I25.10 CORONARY ARTERY DISEASE INVOLVING NATIVE CORONARY ARTERY OF NATIVE HEART WITHOUT ANGINA PECTORIS: Primary | ICD-10-CM

## 2022-08-29 DIAGNOSIS — I25.2 OLD MYOCARDIAL INFARCTION OF INFERIOR WALL, GREATER THAN 8 WEEKS: ICD-10-CM

## 2022-08-29 DIAGNOSIS — I10 ESSENTIAL HYPERTENSION: ICD-10-CM

## 2022-08-29 DIAGNOSIS — E78.00 HYPERCHOLESTEROLEMIA: ICD-10-CM

## 2022-08-29 PROBLEM — R42 LIGHTHEADEDNESS: Status: RESOLVED | Noted: 2021-05-28 | Resolved: 2022-08-29

## 2022-08-29 PROCEDURE — 99213 OFFICE O/P EST LOW 20 MIN: CPT | Performed by: INTERNAL MEDICINE

## 2022-08-29 PROCEDURE — 93000 ELECTROCARDIOGRAM COMPLETE: CPT | Performed by: INTERNAL MEDICINE

## 2022-08-29 NOTE — PROGRESS NOTES
Cardiology Follow Up    Jacquelin James  1965  [id number]  [address] [facility] [address] [address] [address]  [phone]    1  Coronary artery disease involving native coronary artery of native heart without angina pectoris  POCT ECG   2  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, greater than 8 weeks  POCT ECG   3  Essential hypertension  POCT ECG   4  Hypercholesterolemia  POCT ECG         Discussion/Summary: All of his assessed cardiac problems are stable  I have reviewed his medications and made no changes  No cardiac testing is ordered  RTO 1 year  Interval History: He has not had any cardiac problems since his last OV  He is less active but still does a lot  He denies CP, SOB, palpitations, dizziness  ECHO - 2021 - EF 55%    He has diffuse CAD with coronary stenting in 2015  EST 2019  - 11:19 Bryan protocol, no ischemia  LDL 71  /60  Patient Active Problem List   Diagnosis    Coronary artery disease involving native coronary artery of native heart without angina pectoris    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, greater than 8 weeks    Essential hypertension    Hypercholesterolemia     Past Medical History:   Diagnosis Date    Cardiac disease     Hyperlipidemia     Hypertension     [facility]      Social History     Socioeconomic History    Marital status:       Spouse name: Not on file    Number of children: Not on file    Years of education: Not on file    Highest education level: Not on file   Occupational History    Not on file   Tobacco Use    Smoking status: Former Smoker     Types: Cigarettes     Start date: 0     Quit date:      Years since quittin 6    Smokeless tobacco: Former User     Types: [address] date:    Substance and Sexual Activity    Alcohol use: No    Drug use: No    Sexual activity: Not on file   Other Topics Concern    Not on file   Social History Narrative    Not on file     Social Determinants of Health     Financial Resource Strain: Not on file   Food Insecurity: Not on file   Transportation Needs: Not on file   Physical Activity: Not on file   Stress: Not on file   Social Connections: Not on file   Intimate Partner Violence: Not on file   Housing Stability: Not on file      No family history on file    Past Surgical History:   Procedure Laterality Date    CARDIAC SURGERY      CORONARY ANGIOPLASTY WITH STENT PLACEMENT      DEBRIDEMENT TENNIS ELBOW      HERNIA REPAIR      KNEE ARTHROSCOPY      SHOULDER ARTHROSCOPY      VASECTOMY         Current Outpatient Medications:     aspirin (ECOTRIN LOW STRENGTH) 81 mg EC tablet, Take 1 tablet by mouth daily, Disp: , Rfl:     Cholecalciferol (VITAMIN D) 2000 units CAPS, Take by mouth, Disp: , Rfl:     CIALIS 5 MG tablet, , Disp: , Rfl:     JANUVIA 50 MG tablet, , Disp: , Rfl:     Krill Oil 350 MG CAPS, Take by mouth, Disp: , Rfl:     metFORMIN (GLUCOPHAGE) 1000 MG tablet, 2 (two) times a day with meals , Disp: , Rfl:     Red Yeast Rice 600 MG CAPS, Take by mouth, Disp: , Rfl:     rosuvastatin (CRESTOR) 20 MG tablet, Take 1 tablet by mouth daily, Disp: , Rfl:     zolpidem (AMBIEN) 10 mg tablet, one tab at bedtime as needed, Disp: , Rfl:     irbesartan (AVAPRO) 75 mg tablet, , Disp: , Rfl:     naproxen (NAPROSYN) 500 mg tablet, Take 1 tablet (500 mg total) by mouth 2 (two) times a day with meals, Disp: 30 tablet, Rfl: 0  Allergies   Allergen Reactions    Banana - Food Allergy Throat Swelling    Tetanus Toxoid Other (See Comments)     Vitals:    08/29/22 1549   BP: 122/60   BP Location: Left arm   Cuff Size: Large   Pulse: 74   Weight: 97 5 kg (215 lb)   Height: 6' 0 5" (1 842 m)     Weight (last 2 days)     Date/Time Weight    08/29/22 1549 97 5 (215)         Blood pressure 122/60, pulse 74, height 6' 0 5" (1 842 m), weight 97 5 kg (215 lb)  , Body mass index is 28 76 kg/m²  Labs:  No visits with results within 6 Month(s) from this visit  Latest known visit with results is:   Appointment on 04/02/2021   Component Date Value    TSH 3RD JULITON 04/02/2021 1 260      Imaging: No results found  Review of Systems:  Review of Systems   Constitutional: Negative for diaphoresis, fatigue, fever and unexpected weight change  HENT: Negative  Respiratory: Negative for cough, shortness of breath and wheezing  Cardiovascular: Negative for chest pain, palpitations and leg swelling  Gastrointestinal: Negative for abdominal pain, diarrhea and nausea  Musculoskeletal: Negative for gait problem and myalgias  Skin: Negative for rash  Neurological: Negative for dizziness and numbness  Psychiatric/Behavioral: Negative  Physical Exam:  Physical Exam  Constitutional:       Appearance: He is well-developed  HENT:      Head: Normocephalic and atraumatic  Eyes:      Pupils: Pupils are equal, round, and reactive to light  Neck:      Vascular: No JVD  Cardiovascular:      Rate and Rhythm: Regular rhythm  Pulses: Normal pulses  Carotid pulses are 2+ on the right side and 2+ on the left side  Heart sounds: S1 normal and S2 normal    Pulmonary:      Effort: Pulmonary effort is normal       Breath sounds: Normal breath sounds  No wheezing or rales  Abdominal:      General: Bowel sounds are normal       Palpations: Abdomen is soft  Tenderness: There is no abdominal tenderness  Musculoskeletal:         General: No tenderness  Normal range of motion  Cervical back: Normal range of motion and neck supple  Skin:     General: Skin is warm  Neurological:      Mental Status: He is alert and oriented to person, place, and time  Cranial Nerves: No cranial nerve deficit  Deep Tendon Reflexes: Reflexes are normal and symmetric

## 2023-02-23 NOTE — H&P (VIEW-ONLY)
Cardiology Follow Up    Syliva Angelucci  1965  [id number]  [address] [facility] [address] [phone]    1  Chest pain, unspecified type  POCT ECG    CANCELED: Echo stress test, exercise      2  Essential hypertension        3  Hypercholesterolemia        4  Type 2 diabetes mellitus with other specified complication, without long-term current use of insulin [facility])            Interval History:   Mr Magalis Grewal presents to our office with complaints of a 2 week history of left sided chest pain with exertion such as  riding a bicycle and during intimacy  He admits to increased Heart burn and shortness of breath with exertion  Medical History   Primary cardiologist Dr Nae Daigle  IWMI  CAD with prior stenting to RCA in 11/2015   Hypertension  Hypercholesterolemia 4/02/21 , , HDL 28, LDL 84   HgbA1C 7 2 on 11/12/22   Former smoker     12/23/19 exercise stress test duration 11 minutes 19 seconds target heart rate was achieved and no chest pain during stress  ECG conclusion negative for ischemia    5/11/21 TTE: Left ventricular systolic function normal LVEF 55% severe hypokinesis of the basal mid inferior wall  Wall thickness upper limits of normal   Right ventricular size upper limits of normal systolic function normal   Trace MR  Trace TR  Patient Active Problem List   Diagnosis   • Coronary artery disease involving native coronary artery of native heart without angina pectoris   • 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, greater than 8 weeks   • Essential hypertension   • Hypercholesterolemia     Past Medical History:   Diagnosis Date   • Cardiac disease    • Hyperlipidemia    • Hypertension    • MI (myocardial infarction) ([address] )      Social History     Socioeconomic History   • Marital status:       Spouse name: Not on file   • Number of children: Not on file   • Years of education: Not on file   • Highest education level: Not on file   Occupational History   • Not on file   Tobacco Use   • Smoking status: Former     Types: Cigarettes     Start date: 0     Quit date:      Years since quittin 1   • Smokeless tobacco: Former     Types: Chew     Quit date:    Substance and Sexual Activity   • Alcohol use: No   • Drug use: No   • Sexual activity: Not on file   Other Topics Concern   • Not on file   Social History Narrative   • Not on file     Social Determinants of Health     Financial Resource Strain: Not on file   Food Insecurity: Not on file   Transportation Needs: Not on file   Physical Activity: Not on file   Stress: Not on file   Social Connections: Not on file   Intimate Partner Violence: Not on file   Housing Stability: Not on file      No family history on file    Past Surgical History:   Procedure Laterality Date   • CARDIAC SURGERY     • CORONARY ANGIOPLASTY WITH STENT PLACEMENT     • DEBRIDEMENT TENNIS ELBOW     • HERNIA REPAIR     • KNEE ARTHROSCOPY     • SHOULDER ARTHROSCOPY     • VASECTOMY         Current Outpatient Medications:   •  aspirin (ECOTRIN LOW STRENGTH) 81 mg EC tablet, Take 1 tablet by mouth daily, Disp: , Rfl:   •  Cholecalciferol (VITAMIN D) 2000 units CAPS, Take by mouth, Disp: , Rfl:   •  CIALIS 5 MG tablet, , Disp: , Rfl:   •  irbesartan (AVAPRO) 75 mg tablet, , Disp: , Rfl:   •  JANUVIA 50 MG tablet, , Disp: , Rfl:   •  Krill Oil 350 MG CAPS, Take by mouth, Disp: , Rfl:   •  metFORMIN (GLUCOPHAGE) 1000 MG tablet, 2 (two) times a day with meals , Disp: , Rfl:   •  naproxen (NAPROSYN) 500 mg tablet, Take 1 tablet (500 mg total) by mouth 2 (two) times a day with meals, Disp: 30 tablet, Rfl: 0  •  Red Yeast Rice 600 MG CAPS, Take by mouth, Disp: , Rfl:   •  rosuvastatin (CRESTOR) 20 MG tablet, Take 1 tablet by mouth daily, Disp: , Rfl:   •  zolpidem (AMBIEN) 10 mg tablet, one tab at bedtime as needed, Disp: , Rfl:   Allergies   Allergen Reactions   • Banana - Food Allergy Throat Swelling   • Tetanus Toxoid Other (See Comments)       Labs:  No visits with results within 2 Month(s) from this visit  Latest known visit with results is:   Office Visit on 08/29/2022   Component Date Value   • Interpretation 08/29/2022       Imaging: No results found  Review of Systems:  Review of Systems   Respiratory: Positive for shortness of breath  Cardiovascular: Positive for chest pain  Gastrointestinal: Positive for abdominal pain  GERD   All other systems reviewed and are negative  Physical Exam:  Physical Exam  Vitals reviewed  Constitutional:       Appearance: He is well-developed  Pulmonary:      Effort: Pulmonary effort is normal       Breath sounds: Normal breath sounds  Abdominal:      General: Bowel sounds are normal       Palpations: Abdomen is soft  Musculoskeletal:         General: Normal range of motion  Right lower leg: No edema  Left lower leg: No edema  Skin:     General: Skin is warm and dry  Capillary Refill: Capillary refill takes less than 2 seconds  Neurological:      General: No focal deficit present  Mental Status: He is alert and oriented to person, place, and time  Psychiatric:         Mood and Affect: Mood normal          Behavior: Behavior normal          Discussion/Summary:  1  Chest pain, typical occurring with exertion, EKG without changes from previous  Worsening heartburn and shortness of breath  I will schedule cardiac catheterization with Dr Pete Valentine in the near future  I have Instructed Favian Sanz to avoid strenuous activity  Avoid taking Cialis  Continue on Avapro 75 mg daily, aspirin 81 mg daily, Crestor 20 mg daily  2   Hypertension controlled on Avapro 75 mg daily, DASH diet   3, Hypercholesterolemia 4/02/21 , , HDL 28, LDL 84 continue on Crestor 20 mg daily, DASH diet  4  HgbA1C 7 2 on 11/12/22 continue on ReVia 50 mg daily

## 2023-02-23 NOTE — PROGRESS NOTES
Cardiology Follow Up    Campbell Gómez  1965  [id number]  [address] [facility] [address] [phone]    1  Chest pain, unspecified type  POCT ECG    CANCELED: Echo stress test, exercise      2  Essential hypertension        3  Hypercholesterolemia        4  Type 2 diabetes mellitus with other specified complication, without long-term current use of insulin [facility])            Interval History:   Mr Sweta España presents to our office with complaints of a 2 week history of left sided chest pain with exertion such as  riding a bicycle and during intimacy  He admits to increased Heart burn and shortness of breath with exertion  Medical History   Primary cardiologist Dr Freeman Manual  IWMI  CAD with prior stenting to RCA in 11/2015   Hypertension  Hypercholesterolemia 4/02/21 , , HDL 28, LDL 84   HgbA1C 7 2 on 11/12/22   Former smoker     12/23/19 exercise stress test duration 11 minutes 19 seconds target heart rate was achieved and no chest pain during stress  ECG conclusion negative for ischemia    5/11/21 TTE: Left ventricular systolic function normal LVEF 55% severe hypokinesis of the basal mid inferior wall  Wall thickness upper limits of normal   Right ventricular size upper limits of normal systolic function normal   Trace MR  Trace TR  Patient Active Problem List   Diagnosis   • Coronary artery disease involving native coronary artery of native heart without angina pectoris   • 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, greater than 8 weeks   • Essential hypertension   • Hypercholesterolemia     Past Medical History:   Diagnosis Date   • Cardiac disease    • Hyperlipidemia    • Hypertension    • MI (myocardial infarction) ([address] )      Social History     Socioeconomic History   • Marital status:       Spouse name: Not on file   • Number of children: Not on file   • Years of education: Not on file   • Highest education level: Not on file   Occupational History   • Not on file   Tobacco Use   • Smoking status: Former     Types: Cigarettes     Start date: 0     Quit date:      Years since quittin 1   • Smokeless tobacco: Former     Types: Chew     Quit date:    Substance and Sexual Activity   • Alcohol use: No   • Drug use: No   • Sexual activity: Not on file   Other Topics Concern   • Not on file   Social History Narrative   • Not on file     Social Determinants of Health     Financial Resource Strain: Not on file   Food Insecurity: Not on file   Transportation Needs: Not on file   Physical Activity: Not on file   Stress: Not on file   Social Connections: Not on file   Intimate Partner Violence: Not on file   Housing Stability: Not on file      No family history on file    Past Surgical History:   Procedure Laterality Date   • CARDIAC SURGERY     • CORONARY ANGIOPLASTY WITH STENT PLACEMENT     • DEBRIDEMENT TENNIS ELBOW     • HERNIA REPAIR     • KNEE ARTHROSCOPY     • SHOULDER ARTHROSCOPY     • VASECTOMY         Current Outpatient Medications:   •  aspirin (ECOTRIN LOW STRENGTH) 81 mg EC tablet, Take 1 tablet by mouth daily, Disp: , Rfl:   •  Cholecalciferol (VITAMIN D) 2000 units CAPS, Take by mouth, Disp: , Rfl:   •  CIALIS 5 MG tablet, , Disp: , Rfl:   •  irbesartan (AVAPRO) 75 mg tablet, , Disp: , Rfl:   •  JANUVIA 50 MG tablet, , Disp: , Rfl:   •  Krill Oil 350 MG CAPS, Take by mouth, Disp: , Rfl:   •  metFORMIN (GLUCOPHAGE) 1000 MG tablet, 2 (two) times a day with meals , Disp: , Rfl:   •  naproxen (NAPROSYN) 500 mg tablet, Take 1 tablet (500 mg total) by mouth 2 (two) times a day with meals, Disp: 30 tablet, Rfl: 0  •  Red Yeast Rice 600 MG CAPS, Take by mouth, Disp: , Rfl:   •  rosuvastatin (CRESTOR) 20 MG tablet, Take 1 tablet by mouth daily, Disp: , Rfl:   •  zolpidem (AMBIEN) 10 mg tablet, one tab at bedtime as needed, Disp: , Rfl:   Allergies   Allergen Reactions   • Banana - Food Allergy Throat Swelling   • Tetanus Toxoid Other (See Comments)       Labs:  No visits with results within 2 Month(s) from this visit  Latest known visit with results is:   Office Visit on 08/29/2022   Component Date Value   • Interpretation 08/29/2022       Imaging: No results found  Review of Systems:  Review of Systems   Respiratory: Positive for shortness of breath  Cardiovascular: Positive for chest pain  Gastrointestinal: Positive for abdominal pain  GERD   All other systems reviewed and are negative  Physical Exam:  Physical Exam  Vitals reviewed  Constitutional:       Appearance: He is well-developed  Pulmonary:      Effort: Pulmonary effort is normal       Breath sounds: Normal breath sounds  Abdominal:      General: Bowel sounds are normal       Palpations: Abdomen is soft  Musculoskeletal:         General: Normal range of motion  Right lower leg: No edema  Left lower leg: No edema  Skin:     General: Skin is warm and dry  Capillary Refill: Capillary refill takes less than 2 seconds  Neurological:      General: No focal deficit present  Mental Status: He is alert and oriented to person, place, and time  Psychiatric:         Mood and Affect: Mood normal          Behavior: Behavior normal          Discussion/Summary:  1  Chest pain, typical occurring with exertion, EKG without changes from previous  Worsening heartburn and shortness of breath  I will schedule cardiac catheterization with Dr Violeta Gagnon in the near future  I have Instructed Igor Messina to avoid strenuous activity  Avoid taking Cialis  Continue on Avapro 75 mg daily, aspirin 81 mg daily, Crestor 20 mg daily  2   Hypertension controlled on Avapro 75 mg daily, DASH diet   3, Hypercholesterolemia 4/02/21 , , HDL 28, LDL 84 continue on Crestor 20 mg daily, DASH diet  4  HgbA1C 7 2 on 11/12/22 continue on ReVia 50 mg daily

## 2023-02-27 ENCOUNTER — OFFICE VISIT (OUTPATIENT)
Dept: CARDIOLOGY CLINIC | Facility: CLINIC | Age: 58
End: 2023-02-27

## 2023-02-27 ENCOUNTER — TELEPHONE (OUTPATIENT)
Dept: CARDIOLOGY CLINIC | Facility: CLINIC | Age: 58
End: 2023-02-27

## 2023-02-27 VITALS
WEIGHT: 222 LBS | SYSTOLIC BLOOD PRESSURE: 136 MMHG | BODY MASS INDEX: 29.42 KG/M2 | HEART RATE: 67 BPM | DIASTOLIC BLOOD PRESSURE: 82 MMHG | HEIGHT: 73 IN

## 2023-02-27 DIAGNOSIS — E78.00 HYPERCHOLESTEROLEMIA: ICD-10-CM

## 2023-02-27 DIAGNOSIS — I10 ESSENTIAL HYPERTENSION: ICD-10-CM

## 2023-02-27 DIAGNOSIS — E11.69 TYPE 2 DIABETES MELLITUS WITH OTHER SPECIFIED COMPLICATION, WITHOUT LONG-TERM CURRENT USE OF INSULIN (HCC): ICD-10-CM

## 2023-02-27 DIAGNOSIS — R07.9 CHEST PAIN, UNSPECIFIED TYPE: Primary | ICD-10-CM

## 2023-02-27 RX ORDER — TADALAFIL 10 MG/1
10 TABLET ORAL AS NEEDED
COMMUNITY
Start: 2023-02-24

## 2023-02-27 NOTE — TELEPHONE ENCOUNTER
----- Message from Chelsea Dooley, [address] sent at 2/27/2023  4:09 PM EST -----  Regarding: [facility] Please set [facility] up with Dr Vinh Quiñones

## 2023-02-27 NOTE — TELEPHONE ENCOUNTER
Patient scheduled for Left Heart CATH on 3/6/23 at [facility] with Dr Ana Bee  Instructions sent to patient through [address]  Patient aware of all general instructions  Medication holds:   Januvia - Hod morning of procedure  Metformin - Hold night prior and morning of procedure  Labs to be done by 3/1/23:  CMP / CBC (fasting)   Per patient, faxed labs to hospitals in [facility] pass  Fx: [phone]    Insurance: [employer]      Please obtain auth       Thank you,  Karina Elise

## 2023-02-28 NOTE — TELEPHONE ENCOUNTER
[employer] approved auth # [id number] for [id number] @ [place] w/Dr Freeman Manual    Valid dates 2/28/23-3/29/23

## 2023-03-06 ENCOUNTER — HOSPITAL ENCOUNTER (OUTPATIENT)
Facility: HOSPITAL | Age: 58
Setting detail: OUTPATIENT SURGERY
Discharge: HOME | End: 2023-03-07
Attending: INTERNAL MEDICINE | Admitting: INTERNAL MEDICINE

## 2023-03-06 DIAGNOSIS — I25.10 CORONARY ARTERY DISEASE INVOLVING NATIVE CORONARY ARTERY OF NATIVE HEART WITHOUT ANGINA PECTORIS: ICD-10-CM

## 2023-03-06 DIAGNOSIS — Z95.5 STATUS POST INSERTION OF DRUG ELUTING CORONARY ARTERY STENT: Primary | ICD-10-CM

## 2023-03-06 DIAGNOSIS — R07.9 CHEST PAIN, UNSPECIFIED TYPE: ICD-10-CM

## 2023-03-06 DIAGNOSIS — E78.00 HYPERCHOLESTEROLEMIA: ICD-10-CM

## 2023-03-06 LAB
ATRIAL RATE: 72 BPM
KCT BLD-ACNC: 226 SEC (ref 89–137)
KCT BLD-ACNC: 246 SEC (ref 89–137)
P AXIS: 24 DEGREES
PR INTERVAL: 222 MS
QRS AXIS: 23 DEGREES
QRSD INTERVAL: 92 MS
QT INTERVAL: 378 MS
QTC INTERVAL: 413 MS
SPECIMEN SOURCE: ABNORMAL
SPECIMEN SOURCE: ABNORMAL
T WAVE AXIS: 0 DEGREES
VENTRICULAR RATE: 72 BPM

## 2023-03-06 DEVICE — XIENCE SKYPOINT™ EVEROLIMUS ELUTING CORONARY STENT SYSTEM 2.75 MM X 12 MM / RAPID-EXCHANGE
Type: IMPLANTABLE DEVICE | Site: CORONARY | Status: FUNCTIONAL
Brand: XIENCE SKYPOINT™

## 2023-03-06 DEVICE — XIENCE SKYPOINT™ EVEROLIMUS ELUTING CORONARY STENT SYSTEM 2.75 MM X 38 MM / RAPID-EXCHANGE
Type: IMPLANTABLE DEVICE | Site: CORONARY | Status: FUNCTIONAL
Brand: XIENCE SKYPOINT™

## 2023-03-06 RX ORDER — SODIUM CHLORIDE 9 MG/ML
125 INJECTION, SOLUTION INTRAVENOUS CONTINUOUS
Status: DISCONTINUED | OUTPATIENT
Start: 2023-03-06 | End: 2023-03-06

## 2023-03-06 RX ORDER — ASPIRIN 81 MG/1
81 TABLET, CHEWABLE ORAL DAILY
Status: DISCONTINUED | OUTPATIENT
Start: 2023-03-07 | End: 2023-03-07 | Stop reason: HOSPADM

## 2023-03-06 RX ORDER — ASPIRIN 81 MG/1
324 TABLET, CHEWABLE ORAL ONCE
Status: COMPLETED | OUTPATIENT
Start: 2023-03-06 | End: 2023-03-06

## 2023-03-06 RX ORDER — FENTANYL CITRATE 50 UG/ML
INJECTION, SOLUTION INTRAMUSCULAR; INTRAVENOUS CODE/TRAUMA/SEDATION MEDICATION
Status: DISCONTINUED | OUTPATIENT
Start: 2023-03-06 | End: 2023-03-06 | Stop reason: HOSPADM

## 2023-03-06 RX ORDER — NITROGLYCERIN 20 MG/100ML
INJECTION INTRAVENOUS CODE/TRAUMA/SEDATION MEDICATION
Status: DISCONTINUED | OUTPATIENT
Start: 2023-03-06 | End: 2023-03-06 | Stop reason: HOSPADM

## 2023-03-06 RX ORDER — HEPARIN SODIUM 1000 [USP'U]/ML
INJECTION, SOLUTION INTRAVENOUS; SUBCUTANEOUS CODE/TRAUMA/SEDATION MEDICATION
Status: DISCONTINUED | OUTPATIENT
Start: 2023-03-06 | End: 2023-03-06 | Stop reason: HOSPADM

## 2023-03-06 RX ORDER — METOPROLOL SUCCINATE 25 MG/1
25 TABLET, EXTENDED RELEASE ORAL DAILY
Status: DISCONTINUED | OUTPATIENT
Start: 2023-03-06 | End: 2023-03-07 | Stop reason: HOSPADM

## 2023-03-06 RX ORDER — LOSARTAN POTASSIUM 25 MG/1
25 TABLET ORAL DAILY
Status: DISCONTINUED | OUTPATIENT
Start: 2023-03-07 | End: 2023-03-07 | Stop reason: HOSPADM

## 2023-03-06 RX ORDER — SODIUM CHLORIDE 9 MG/ML
75 INJECTION, SOLUTION INTRAVENOUS CONTINUOUS
Status: DISPENSED | OUTPATIENT
Start: 2023-03-06 | End: 2023-03-06

## 2023-03-06 RX ORDER — ZOLPIDEM TARTRATE 5 MG/1
10 TABLET ORAL
Status: DISCONTINUED | OUTPATIENT
Start: 2023-03-06 | End: 2023-03-07 | Stop reason: HOSPADM

## 2023-03-06 RX ORDER — ATORVASTATIN CALCIUM 80 MG/1
80 TABLET, FILM COATED ORAL EVERY EVENING
Status: DISCONTINUED | OUTPATIENT
Start: 2023-03-06 | End: 2023-03-07 | Stop reason: HOSPADM

## 2023-03-06 RX ORDER — NITROGLYCERIN 0.4 MG/1
0.4 TABLET SUBLINGUAL
Status: DISCONTINUED | OUTPATIENT
Start: 2023-03-06 | End: 2023-03-07 | Stop reason: HOSPADM

## 2023-03-06 RX ORDER — LIDOCAINE HYDROCHLORIDE 10 MG/ML
INJECTION, SOLUTION EPIDURAL; INFILTRATION; INTRACAUDAL; PERINEURAL CODE/TRAUMA/SEDATION MEDICATION
Status: DISCONTINUED | OUTPATIENT
Start: 2023-03-06 | End: 2023-03-06 | Stop reason: HOSPADM

## 2023-03-06 RX ORDER — ONDANSETRON 2 MG/ML
4 INJECTION INTRAMUSCULAR; INTRAVENOUS EVERY 6 HOURS PRN
Status: DISCONTINUED | OUTPATIENT
Start: 2023-03-06 | End: 2023-03-07 | Stop reason: HOSPADM

## 2023-03-06 RX ORDER — VERAPAMIL HYDROCHLORIDE 2.5 MG/ML
INJECTION, SOLUTION INTRAVENOUS CODE/TRAUMA/SEDATION MEDICATION
Status: DISCONTINUED | OUTPATIENT
Start: 2023-03-06 | End: 2023-03-06 | Stop reason: HOSPADM

## 2023-03-06 RX ORDER — MIDAZOLAM HYDROCHLORIDE 2 MG/2ML
INJECTION, SOLUTION INTRAMUSCULAR; INTRAVENOUS CODE/TRAUMA/SEDATION MEDICATION
Status: DISCONTINUED | OUTPATIENT
Start: 2023-03-06 | End: 2023-03-06 | Stop reason: HOSPADM

## 2023-03-06 RX ORDER — ACETAMINOPHEN 325 MG/1
650 TABLET ORAL EVERY 4 HOURS PRN
Status: DISCONTINUED | OUTPATIENT
Start: 2023-03-06 | End: 2023-03-07 | Stop reason: HOSPADM

## 2023-03-06 RX ADMIN — METOPROLOL SUCCINATE 25 MG: 25 TABLET, EXTENDED RELEASE ORAL at 17:52

## 2023-03-06 RX ADMIN — ATORVASTATIN CALCIUM 80 MG: 80 TABLET, FILM COATED ORAL at 17:53

## 2023-03-06 RX ADMIN — TICAGRELOR 90 MG: 90 TABLET ORAL at 17:53

## 2023-03-06 RX ADMIN — SODIUM CHLORIDE 125 ML/HR: 0.9 INJECTION, SOLUTION INTRAVENOUS at 09:08

## 2023-03-06 RX ADMIN — ASPIRIN 324 MG: 81 TABLET, CHEWABLE ORAL at 08:38

## 2023-03-06 RX ADMIN — ACETAMINOPHEN 325MG 650 MG: 325 TABLET ORAL at 12:25

## 2023-03-06 NOTE — PROGRESS NOTES
Cardiac cath performed via the R radial artery  Long mid LAD lesion 70 - 80 %  IFR positive for ischemia    Successful PCI with placement of 2  2 75 mm Ronni placement  ( overlapping )

## 2023-03-06 NOTE — DISCHARGE SUMMARY
Discharge Summary - Judith Iqbal 62 y o  male MRN: [id number]    Unit/Bed#: [facility] CATH LAB ROOM Encounter: [id number]       Admission Date: 3/6/2023   Discharge Date:  3/7/2023    Disposition: Home  Condition at Discharge: stable   Planned Readmission: Cindy Hagen MD  OP Cardiologist: Dr Allison Mcallister   Interventional cardiologist: Dr Allison Mcallister       Admitting Diagnosis:  Chest pain with  Exertion  Coronary artery disease    Secondary Diagnoses:   Hypertension  Hyperlipidemia  -Cholesterol 166, triglycerides 272, HDL 28 and LDL 84  Diabetes type 2  -Hemoglobin A1c 7 2    Discharge Diagnosis: Coronary artery disease status post PCI of mid LAD, and successful placement of two Xience  drug-eluting stents 2 75 mm X 38 and 2 75 X 12 mm (overlapping)  Physical exam on day of discharge  /99   Pulse 75   Temp (!) 96 9 °F (36 1 °C) (Temporal)   Resp 20   Ht 6' (1 829 m)   Wt 98 9 kg (218 lb)   SpO2 99%   BMI 29 57 kg/m²     General:  Alert normally conversant and comfortable-appearing  Heart:  Regular with controlled rate and no pathologic murmur or rub  Right wrist (site of catheterization access): No ecchymosis or hematoma with intact pulse and capillary refill  Lungs:  Clear throughout  Lower Limbs:  No edema      Reason for admission  Mr Stephenie Dowd, a 80-year-old patient, presented to [address] [place] [address] for elective cardiac catheterization for evaluation of chest pain with exertion  Over the last month he has experienced left-sided chest pain with exertion including such activities as riding a bicycle and during intimacy  associated with shortness of breath and worsening heartburn  Past medical history is significant for history of coronary artery disease with prior stenting to RCA (11/2015), hypertension, hyperlipidemia and diabetes type 2  (Hemoglobin A1c 7 20  Remote history of cigarette smoking      Echocardiogram performed 5/2021 showed: Left ventricular ejection fraction 55%  Severe hypokinesis of the basal mid inferior wall  No significant valvular disease  Hospital course  Prior to cardiac catheterization he was given aspirin 324 mg and Brilinta 180 mg  Cardiac catheterization was performed via the right radial artery  Left anterior descending vessel is visualized by angiography and a moderate in size  There are faint left to right collaterals to the distal RCA system  Mid LAD lesion 80% stenosed, confirmed by IFR (0 89)  Status post placement of twoi Xience stents  2 75 x 38 mm drug-eluting stent and Xience  2 75 X 12 mm (overlapping)  Postintervention SYBIL flow 3   0% residual stenosis postintervention  Patient's postprocedural course was unremarkable with no complaints on hospital day #2  His chemistry was unremarkable particularly noting BUN and creatinine of 14 and 0 92  Catheterization results were reviewed and all his questions were answered  He was discharged in stable and satisfactory condition with planned follow-up at the [facility] office where he is normally seen      Discharge plan:  1  Dual antiplatelet therapy with aspirin 81 mg daily and Brilinta 90 mg twice daily  2  Toprol XL 25 mg daily  3  Increase Crestor to 40 mg daily  4  Cardiac rehab referral made  5  Follow-up office visit with Cardiology, 3/20/23 at 5:00 PM on the [place] [address] location (per patient's request)  6   Consider  Endocrinology consultation for management of diabetes (hgb A1c 7 2)       ------------------------------------------------------    Pertinent Labs/diagnostics:    Current Facility-Administered Medications   Medication Dose Route Frequency   • acetaminophen (TYLENOL) tablet 650 mg  650 mg Oral Q4H PRN   • [START ON 3/7/2023] aspirin chewable tablet 81 mg  81 mg Oral Daily   • atorvastatin (LIPITOR) tablet 80 mg  80 mg Oral QPM   • [START ON 3/7/2023] losartan (COZAAR) tablet 25 mg  25 mg Oral Daily   • metoprolol succinate (TOPROL-XL) 24 hr tablet 25 mg  25 mg Oral Daily   • nitroglycerin (NITROSTAT) SL tablet 0 4 mg  0 4 mg Sublingual Q5 Min PRN   • ondansetron (ZOFRAN) injection 4 mg  4 mg Intravenous Q6H PRN   • [START ON 3/7/2023] sitaGLIPtin (JANUVIA) tablet 50 mg  50 mg Oral Daily   • sodium chloride 0 9 % infusion  75 mL/hr Intravenous Continuous   • ticagrelor (BRILINTA) tablet 90 mg  90 mg Oral BID   • zolpidem (AMBIEN) tablet 10 mg  10 mg Oral HS PRN       Lab Ressults:  Recent Results (from the past 24 hour(s))   ECG 12 lead    Collection Time: 03/06/23  9:50 AM   Result Value Ref Range    Ventricular Rate 72 BPM    Atrial Rate 72 BPM    NM Interval 222 ms    QRSD Interval 92 ms    QT Interval 378 ms    QTC Interval 413 ms    P [place] 24 degrees    QRS Axis 23 degrees    T Wave Axis 0 degrees   POCT activated clotting time    Collection Time: 03/06/23 10:40 AM   Result Value Ref Range    Activated Clotting Time, i-STAT 246 (H) 89 - 137 sec    Specimen Type VENOUS    POCT activated clotting time    Collection Time: 03/06/23 11:12 AM   Result Value Ref Range    Activated Clotting Time, i-STAT 226 (H) 89 - 137 sec    Specimen Type VENOUS        Lipid Profile:   Lab Results   Component Value Date    CHOL 141 05/26/2018    CHOL 119 11/30/2015    CHOL 172 06/24/2015     Lab Results   Component Value Date    HDL 28 (L) 04/02/2021    HDL 29 (L) 04/06/2019    HDL 24 (L) 12/07/2018     Lab Results   Component Value Date    LDLCALC 84 04/02/2021    LDLCALC 98 04/06/2019    LDLCALC 60 12/07/2018     Lab Results   Component Value Date    TRIG 272 (H) 04/02/2021    TRIG 107 04/06/2019    TRIG 168 (H) 12/07/2018         Cardiac testing:   Results for orders placed during the hospital encounter of 05/11/21    Echo complete with contrast if indicated    Kelsie Vasquez  [address]  [phone]    Transthoracic Echocardiogram  2D, M-mode, Doppler, and Color Doppler    Study date: 11-May-2021    Patient: Montana Hodge  MR number: [id number]  Account number: [de-identified]  : 1965  Age: 54 years  Gender: Male  Status: Outpatient  Location: [address] [place] [address] [facility] [place]  Height: 72 in  Weight: 227 lb  BP: 154/ 80 mmHg    Indications: Myocardial infarction    Diagnoses: I25 2 - Old myocardial infarction    Sonographer:  STEFAN Doan  Primary Physician:  Arabella Felty, MD  Referring Physician:  Karlene Butt MD  Group:  [facility]  Interpreting Physician:  Romelle Siemens, MD    SUMMARY    LEFT VENTRICLE:  Systolic function was normal  Ejection fraction was estimated to be 55 %  There was severe hypokinesis of the basal-mid inferior wall(s)  Wall thickness was at the upper limits of normal     RIGHT VENTRICLE:  The size was at the upper limits of normal   Systolic function was normal     MITRAL VALVE:  There was trace regurgitation  TRICUSPID VALVE:  There was trace regurgitation  HISTORY: PRIOR HISTORY: Hypertension, hyperlipidemia, coronary artery disease, stent, former smoker    PROCEDURE: The study was performed in the [address]  This was a routine study  The transthoracic approach was used  The study included complete 2D imaging, M-mode, complete spectral Doppler, and color Doppler  Image  quality was adequate  LEFT VENTRICLE: Size was normal  Systolic function was normal  Ejection fraction was estimated to be 55 %  There was severe hypokinesis of the basal-mid inferior wall(s)  Wall thickness was at the upper limits of normal  DOPPLER: Left  ventricular diastolic function parameters were normal  There was no evidence of elevated ventricular filling pressure by Doppler parameters      RIGHT VENTRICLE: The size was at the upper limits of normal  Systolic function was normal  Wall thickness was normal     LEFT ATRIUM: Size was at the upper limits of normal     RIGHT ATRIUM: Size was at the upper limits of normal     MITRAL VALVE: Valve structure was normal  There was normal leaflet separation  DOPPLER: The transmitral velocity was within the normal range  There was no evidence for stenosis  There was trace regurgitation  AORTIC VALVE: The valve was trileaflet  Leaflets exhibited normal thickness, mild calcification, normal cuspal separation, and sclerosis  DOPPLER: Transaortic velocity was within the normal range  There was no evidence for stenosis  There  was no significant regurgitation  TRICUSPID VALVE: The valve structure was normal  There was normal leaflet separation  DOPPLER: The transtricuspid velocity was within the normal range  There was no evidence for stenosis  There was trace regurgitation  PULMONIC VALVE: Leaflets exhibited normal thickness, no calcification, and normal cuspal separation  DOPPLER: The transpulmonic velocity was within the normal range  There was no significant regurgitation  PERICARDIUM: There was no pericardial effusion  The pericardium was normal in appearance  AORTA: The root exhibited normal size  SYSTEMIC VEINS: IVC: The inferior vena cava was normal in size  PULMONARY VEINS: DOPPLER: Doppler flow pattern was normal in the pulmonary vein(s)      SYSTEM MEASUREMENT TABLES    2D  %FS: 35 84 %  AVC: 353 2 ms  Ao Diam: 3 76 cm  EDV(Teich): 126 23 ml  EF(Teich): 65 05 %  ESV(Teich): 44 12 ml  HR_2Ch_Q: 78 95 BPM  HR_4Ch_Q: 76 76 BPM  IVSd: 1 04 cm  LA Area: 16 67 cm2  LA Diam: 3 73 cm  LVCO_2Ch_Q: 4 89 L/min  LVCO_4Ch_Q: 3 57 L/min  LVCO_BiP_Q: 4 23 L/min  LVEDV MOD A4C: 153 13 ml  LVEF MOD A4C: 54 68 %  LVEF_2Ch_Q: 51 28 %  LVEF_4Ch_Q: 46 66 %  LVEF_BiP_Q: 50 02 %  LVESV MOD A4C: 69 4 ml  LVIDd: 5 14 cm  LVIDs: 3 3 cm  LVLd A4C: 8 12 cm  LVLd_2Ch_Q: 8 34 cm  LVLd_4Ch_Q: 7 26 cm  LVLs A4C: 6 67 cm  LVLs_2Ch_Q: 7 01 cm  LVLs_4Ch_Q: 6 2 cm  LVPWd: 0 97 cm  LVSV_2Ch_Q: 61 96 ml  LVSV_4Ch_Q: 46 51 ml  LVSV_BiP_Q: 55 18 ml  LVVED_2Ch_Q: 120 82 ml  LVVED_4Ch_Q: 99 69 ml  LVVED_BiP_Q: 110 32 ml  LVVES_2Ch_Q: 58 86 ml  LVVES_4Ch_Q: 53 18 ml  LVVES_BiP_Q: 55 14 ml  RA Area: 20 95 cm2  RVIDd: 4 45 cm  SV MOD A4C: 83 73 ml  SV(Teich): 82 11 ml    MM  TAPSE: 2 98 cm    PW  E' Sept: 0 09 m/s  E/E' Sept: 6 99  MV A Alejandro: 0 64 m/s  MV Dec [place]: 2 94 m/s2  MV DecT: 214 03 ms  MV E Alejandro: 0 63 m/s  MV E/A Ratio: 0 98  MV PHT: 62 07 ms  MVA By PHT: 3 54 cm2    [employer] Accredited Echocardiography Laboratory    Prepared and electronically signed by    Parviz Luna MD  Signed 11-May-2021 16:21:58    No results found for this or any previous visit  No valid procedures specified  No results found for this or any previous visit  Discharge instructions/Information to patient and family:   See after visit summary for information provided to patient and family  Provisions for Follow-Up Care:  See after visit summary for information related to follow-up care and any pertinent home health orders  Discharge Statement:  I spent 45 minutes minutes discharging the patient  This time was spent on the day of discharge  I had direct contact with the patient on the day of discharge  Additional documentation is required if more than 30 minutes were spent on discharge  ** Please Note: Fluency Direct Dictation voice to text software may have been used in the creation of this document   **

## 2023-03-06 NOTE — PLAN OF CARE
Problem: PAIN - ADULT  Goal: Verbalizes/displays adequate comfort level or baseline comfort level  Description: Interventions:  - Encourage patient to monitor pain and request assistance  - Assess pain using appropriate pain scale  - Administer analgesics based on type and severity of pain and evaluate response  - Implement non-pharmacological measures as appropriate and evaluate response  - Consider cultural and social influences on pain and pain management  - Notify physician/advanced practitioner if interventions unsuccessful or patient reports new pain  Outcome: Progressing     Problem: INFECTION - ADULT  Goal: Absence or prevention of progression during hospitalization  Description: INTERVENTIONS:  - Assess and monitor for signs and symptoms of infection  - Monitor lab/diagnostic results  - Monitor all insertion sites, i e  indwelling lines, tubes, and drains  - Monitor endotracheal if appropriate and nasal secretions for changes in amount and color  - [place] appropriate cooling/warming therapies per order  - Administer medications as ordered  - Instruct and encourage patient and family to use good hand hygiene technique  - Identify and instruct in appropriate isolation precautions for identified infection/condition  Outcome: Progressing  Goal: Absence of fever/infection during neutropenic period  Description: INTERVENTIONS:  - Monitor WBC    Outcome: Progressing     Problem: SAFETY ADULT  Goal: Patient will remain free of falls  Description: INTERVENTIONS:  - Educate patient/family on patient safety including physical limitations  - Instruct patient to call for assistance with activity   - Consult OT/PT to assist with strengthening/mobility   - Keep Call bell within reach  - Keep bed low and locked with side rails adjusted as appropriate  - Keep care items and personal belongings within reach  - Initiate and maintain comfort rounds  - Make Fall Risk Sign visible to staff  - Offer Toileting every  Hours, in advance of need  - Initiate/Maintain alarm  - Obtain necessary fall risk management equipment:   - Apply yellow socks and bracelet for high fall risk patients  - Consider moving patient to room near nurses station  Outcome: Progressing  Goal: Maintain or return to baseline ADL function  Description: INTERVENTIONS:  -  Assess patient's ability to carry out ADLs; assess patient's baseline for ADL function and identify physical deficits which impact ability to perform ADLs (bathing, care of mouth/teeth, toileting, grooming, dressing, etc )  - Assess/evaluate cause of self-care deficits   - Assess range of motion  - Assess patient's mobility; develop plan if impaired  - Assess patient's need for assistive devices and provide as appropriate  - Encourage maximum independence but intervene and supervise when necessary  - Involve family in performance of ADLs  - Assess for home care needs following discharge   - Consider OT consult to assist with ADL evaluation and planning for discharge  - Provide patient education as appropriate  Outcome: Progressing  Goal: Maintains/Returns to pre admission functional level  Description: INTERVENTIONS:  - Perform BMAT or MOVE assessment daily    - Set and communicate daily mobility goal to care team and patient/family/caregiver  - Collaborate with rehabilitation services on mobility goals if consulted  - Perform Range of Motion times a day  - Reposition patient every  hours    - Dangle patient  times a day  - Stand patient  times a day  - Ambulate patient  times a day  - Out of bed to chair  times a day   - Out of bed for meals times a day  - Out of bed for toileting  - Record patient progress and toleration of activity level   Outcome: Progressing     Problem: DISCHARGE PLANNING  Goal: Discharge to home or other facility with appropriate resources  Description: INTERVENTIONS:  - Identify barriers to discharge w/patient and caregiver  - Arrange for needed discharge resources and transportation as appropriate  - Identify discharge learning needs (meds, wound care, etc )  - Arrange for interpretive services to assist at discharge as needed  - Refer to Case Management Department for coordinating discharge planning if the patient needs post-hospital services based on physician/advanced practitioner order or complex needs related to functional status, cognitive ability, or social support system  Outcome: Progressing     Problem: Knowledge Deficit  Goal: Patient/family/caregiver demonstrates understanding of disease process, treatment plan, medications, and discharge instructions  Description: Complete learning assessment and assess knowledge base    Interventions:  - Provide teaching at level of understanding  - Provide teaching via preferred learning methods  Outcome: Progressing

## 2023-03-06 NOTE — INTERVAL H&P NOTE
Update: (This section must be completed if the H&P was completed greater than 24 hrs to procedure or admission)    H&P reviewed  After examining the patient, I find no changed to the H&P since it had been written  Patient re-evaluated   Accept as history and physical     Royce Mclaughlin MD/March 6, 2023/9:45 AM

## 2023-03-06 NOTE — DISCHARGE INSTR - AVS FIRST PAGE
Do not take metformin for the next 48 hours  During this time avoid simple sugars and try to limit your intake of carbohydrates  1  Please see the post angioplasty discharge instructions  No heavy lifting, greater than 10 lbs  or strenuous activity for 5 days  Follow angioplasty discharge instructions  2 Remove band aid tomorrow  Shower and wash area- wrist gently with soap and water- beginning tomorrow  Rinse and pat dry  Apply new water seal band aid  Repeat this process for 5 days  No powders, creams lotions or antibiotic ointments  for 5 days  No tub baths, hot tubs or swimming for 5 days  3  Call [place] [address] Cardiology Office ([phone]) if you develop a fever, redness or drainage at your wrist access site  4  No driving for 2 days    5  Do not stop aspirin or Brilinta (Ticagrelor) any reason without a cardiologist’s consent, or the stent could block up and cause a heart attack  6  Stent card and book

## 2023-03-06 NOTE — LETTER
[address] [place] [address] [address]  Dept: [phone]    March 6, 2023     Patient: Caty Ochoa   YOB: 1965   Date of Visit: 3/6/2023       To Whom it May Concern:    Caty Ochoa is under my professional care  He was seen in the hospital from 3/6/2023 to 3/7/2023  He may return to work on 3/11/2023 without limitations  If you have any questions or concerns, please don't hesitate to call           Sincerely,          NARGIS Gomez

## 2023-03-07 VITALS
DIASTOLIC BLOOD PRESSURE: 87 MMHG | OXYGEN SATURATION: 96 % | TEMPERATURE: 96.8 F | HEART RATE: 70 BPM | WEIGHT: 218 LBS | SYSTOLIC BLOOD PRESSURE: 146 MMHG | RESPIRATION RATE: 16 BRPM | BODY MASS INDEX: 29.53 KG/M2 | HEIGHT: 72 IN

## 2023-03-07 LAB
ANION GAP SERPL CALCULATED.3IONS-SCNC: 7 MMOL/L (ref 4–13)
BUN SERPL-MCNC: 14 MG/DL (ref 5–25)
CALCIUM SERPL-MCNC: 9.2 MG/DL (ref 8.4–10.2)
CHLORIDE SERPL-SCNC: 103 MMOL/L (ref 96–108)
CO2 SERPL-SCNC: 26 MMOL/L (ref 21–32)
CREAT SERPL-MCNC: 0.92 MG/DL (ref 0.6–1.3)
ERYTHROCYTE [DISTWIDTH] IN BLOOD BY AUTOMATED COUNT: 11.7 % (ref 11.6–15.1)
GFR SERPL CREATININE-BSD FRML MDRD: 91 ML/MIN/1.73SQ M
GLUCOSE P FAST SERPL-MCNC: 227 MG/DL (ref 65–99)
GLUCOSE SERPL-MCNC: 227 MG/DL (ref 65–140)
HCT VFR BLD AUTO: 42.4 % (ref 36.5–49.3)
HGB BLD-MCNC: 14.5 G/DL (ref 12–17)
MCH RBC QN AUTO: 30.9 PG (ref 26.8–34.3)
MCHC RBC AUTO-ENTMCNC: 34.2 G/DL (ref 31.4–37.4)
MCV RBC AUTO: 90 FL (ref 82–98)
PLATELET # BLD AUTO: 205 THOUSANDS/UL (ref 149–390)
PMV BLD AUTO: 10.6 FL (ref 8.9–12.7)
POTASSIUM SERPL-SCNC: 4 MMOL/L (ref 3.5–5.3)
RBC # BLD AUTO: 4.69 MILLION/UL (ref 3.88–5.62)
SODIUM SERPL-SCNC: 136 MMOL/L (ref 135–147)
WBC # BLD AUTO: 11 THOUSAND/UL (ref 4.31–10.16)

## 2023-03-07 RX ORDER — ROSUVASTATIN CALCIUM 20 MG/1
10 TABLET, COATED ORAL DAILY
Qty: 30 TABLET | Refills: 2 | Status: SHIPPED | OUTPATIENT
Start: 2023-03-07

## 2023-03-07 RX ORDER — METOPROLOL SUCCINATE 25 MG/1
25 TABLET, EXTENDED RELEASE ORAL DAILY
Qty: 30 TABLET | Refills: 0 | Status: SHIPPED | OUTPATIENT
Start: 2023-03-08

## 2023-03-07 RX ADMIN — METOPROLOL SUCCINATE 25 MG: 25 TABLET, EXTENDED RELEASE ORAL at 09:28

## 2023-03-07 RX ADMIN — TICAGRELOR 90 MG: 90 TABLET ORAL at 09:27

## 2023-03-07 RX ADMIN — ASPIRIN 81 MG: 81 TABLET, CHEWABLE ORAL at 09:28

## 2023-03-07 RX ADMIN — SITAGLIPTIN 50 MG: 50 TABLET, FILM COATED ORAL at 09:28

## 2023-03-07 RX ADMIN — LOSARTAN POTASSIUM 25 MG: 25 TABLET, FILM COATED ORAL at 09:28

## 2023-03-07 NOTE — PLAN OF CARE
Problem: PAIN - ADULT  Goal: Verbalizes/displays adequate comfort level or baseline comfort level  Description: Interventions:  - Encourage patient to monitor pain and request assistance  - Assess pain using appropriate pain scale  - Administer analgesics based on type and severity of pain and evaluate response  - Implement non-pharmacological measures as appropriate and evaluate response  - Consider cultural and social influences on pain and pain management  - Notify physician/advanced practitioner if interventions unsuccessful or patient reports new pain  Outcome: Progressing     Problem: INFECTION - ADULT  Goal: Absence or prevention of progression during hospitalization  Description: INTERVENTIONS:  - Assess and monitor for signs and symptoms of infection  - Monitor lab/diagnostic results  - Monitor all insertion sites, i e  indwelling lines, tubes, and drains  - Monitor endotracheal if appropriate and nasal secretions for changes in amount and color  - [place] appropriate cooling/warming therapies per order  - Administer medications as ordered  - Instruct and encourage patient and family to use good hand hygiene technique  - Identify and instruct in appropriate isolation precautions for identified infection/condition  Outcome: Progressing  Goal: Absence of fever/infection during neutropenic period  Description: INTERVENTIONS:  - Monitor WBC    Outcome: Progressing     Problem: SAFETY ADULT  Goal: Patient will remain free of falls  Description: INTERVENTIONS:  - Educate patient/family on patient safety including physical limitations  - Instruct patient to call for assistance with activity   - Consult OT/PT to assist with strengthening/mobility   - Keep Call bell within reach  - Keep bed low and locked with side rails adjusted as appropriate  - Keep care items and personal belongings within reach  - Initiate and maintain comfort rounds  - Make Fall Risk Sign visible to staff  - Offer Toileting every 2 Hours, in advance of need  - Initiate/Maintain bed alarm  - Obtain necessary fall risk management equipment: alarm  - Apply yellow socks and bracelet for high fall risk patients  - Consider moving patient to room near nurses station  Outcome: Progressing  Goal: Maintain or return to baseline ADL function  Description: INTERVENTIONS:  -  Assess patient's ability to carry out ADLs; assess patient's baseline for ADL function and identify physical deficits which impact ability to perform ADLs (bathing, care of mouth/teeth, toileting, grooming, dressing, etc )  - Assess/evaluate cause of self-care deficits   - Assess range of motion  - Assess patient's mobility; develop plan if impaired  - Assess patient's need for assistive devices and provide as appropriate  - Encourage maximum independence but intervene and supervise when necessary  - Involve family in performance of ADLs  - Assess for home care needs following discharge   - Consider OT consult to assist with ADL evaluation and planning for discharge  - Provide patient education as appropriate  Outcome: Progressing  Goal: Maintains/Returns to pre admission functional level  Description: INTERVENTIONS:  - Perform BMAT or MOVE assessment daily    - Set and communicate daily mobility goal to care team and patient/family/caregiver  - Collaborate with rehabilitation services on mobility goals if consulted  - Perform Range of Motion 3 times a day  - Reposition patient every 3 hours    - Dangle patient 3 times a day  - Stand patient 3 times a day  - Ambulate patient 3 times a day  - Out of bed to chair 3 times a day   - Out of bed for meals 3 times a day  - Out of bed for toileting  - Record patient progress and toleration of activity level   Outcome: Progressing     Problem: DISCHARGE PLANNING  Goal: Discharge to home or other facility with appropriate resources  Description: INTERVENTIONS:  - Identify barriers to discharge w/patient and caregiver  - Arrange for needed discharge resources and transportation as appropriate  - Identify discharge learning needs (meds, wound care, etc )  - Arrange for interpretive services to assist at discharge as needed  - Refer to Case Management Department for coordinating discharge planning if the patient needs post-hospital services based on physician/advanced practitioner order or complex needs related to functional status, cognitive ability, or social support system  Outcome: Progressing     Problem: Knowledge Deficit  Goal: Patient/family/caregiver demonstrates understanding of disease process, treatment plan, medications, and discharge instructions  Description: Complete learning assessment and assess knowledge base    Interventions:  - Provide teaching at level of understanding  - Provide teaching via preferred learning methods  Outcome: Progressing

## 2023-03-07 NOTE — PLAN OF CARE
Problem: PAIN - ADULT  Goal: Verbalizes/displays adequate comfort level or baseline comfort level  Description: Interventions:  - Encourage patient to monitor pain and request assistance  - Assess pain using appropriate pain scale  - Administer analgesics based on type and severity of pain and evaluate response  - Implement non-pharmacological measures as appropriate and evaluate response  - Consider cultural and social influences on pain and pain management  - Notify physician/advanced practitioner if interventions unsuccessful or patient reports new pain  Outcome: Progressing     Problem: INFECTION - ADULT  Goal: Absence or prevention of progression during hospitalization  Description: INTERVENTIONS:  - Assess and monitor for signs and symptoms of infection  - Monitor lab/diagnostic results  - Monitor all insertion sites, i e  indwelling lines, tubes, and drains  - Monitor endotracheal if appropriate and nasal secretions for changes in amount and color  - [place] appropriate cooling/warming therapies per order  - Administer medications as ordered  - Instruct and encourage patient and family to use good hand hygiene technique  - Identify and instruct in appropriate isolation precautions for identified infection/condition  Outcome: Progressing  Goal: Absence of fever/infection during neutropenic period  Description: INTERVENTIONS:  - Monitor WBC    Outcome: Progressing     Problem: SAFETY ADULT  Goal: Patient will remain free of falls  Description: INTERVENTIONS:  - Educate patient/family on patient safety including physical limitations  - Instruct patient to call for assistance with activity   - Consult OT/PT to assist with strengthening/mobility   - Keep Call bell within reach  - Keep bed low and locked with side rails adjusted as appropriate  - Keep care items and personal belongings within reach  - Initiate and maintain comfort rounds  - Make Fall Risk Sign visible to staff  - Offer Toileting every  Hours, in advance of need  - Initiate/Maintain alarm  - Obtain necessary fall risk management equipment:   - Apply yellow socks and bracelet for high fall risk patients  - Consider moving patient to room near nurses station  Outcome: Progressing  Goal: Maintain or return to baseline ADL function  Description: INTERVENTIONS:  -  Assess patient's ability to carry out ADLs; assess patient's baseline for ADL function and identify physical deficits which impact ability to perform ADLs (bathing, care of mouth/teeth, toileting, grooming, dressing, etc )  - Assess/evaluate cause of self-care deficits   - Assess range of motion  - Assess patient's mobility; develop plan if impaired  - Assess patient's need for assistive devices and provide as appropriate  - Encourage maximum independence but intervene and supervise when necessary  - Involve family in performance of ADLs  - Assess for home care needs following discharge   - Consider OT consult to assist with ADL evaluation and planning for discharge  - Provide patient education as appropriate  Outcome: Progressing  Goal: Maintains/Returns to pre admission functional level  Description: INTERVENTIONS:  - Perform BMAT or MOVE assessment daily    - Set and communicate daily mobility goal to care team and patient/family/caregiver  - Collaborate with rehabilitation services on mobility goals if consulted  - Perform Range of Motion times a day  - Reposition patient every  hours    - Dangle patient  times a day  - Stand patient  times a day  - Ambulate patient  times a day  - Out of bed to chair  times a day   - Out of bed for meals  times a day  - Out of bed for toileting  - Record patient progress and toleration of activity level   Outcome: Progressing     Problem: DISCHARGE PLANNING  Goal: Discharge to home or other facility with appropriate resources  Description: INTERVENTIONS:  - Identify barriers to discharge w/patient and caregiver  - Arrange for needed discharge resources and transportation as appropriate  - Identify discharge learning needs (meds, wound care, etc )  - Arrange for interpretive services to assist at discharge as needed  - Refer to Case Management Department for coordinating discharge planning if the patient needs post-hospital services based on physician/advanced practitioner order or complex needs related to functional status, cognitive ability, or social support system  Outcome: Progressing     Problem: Knowledge Deficit  Goal: Patient/family/caregiver demonstrates understanding of disease process, treatment plan, medications, and discharge instructions  Description: Complete learning assessment and assess knowledge base    Interventions:  - Provide teaching at level of understanding  - Provide teaching via preferred learning methods  Outcome: Progressing

## 2023-03-07 NOTE — NURSING NOTE
Patient discharged to home accompanied by significant other  IV removed  AVS and post cath paper work reviewed with the patient  No further questions at this time

## 2023-03-09 ENCOUNTER — OFFICE VISIT (OUTPATIENT)
Dept: ENDOCRINOLOGY | Facility: CLINIC | Age: 58
End: 2023-03-09

## 2023-03-09 ENCOUNTER — TELEPHONE (OUTPATIENT)
Dept: GASTROENTEROLOGY | Facility: CLINIC | Age: 58
End: 2023-03-09

## 2023-03-09 VITALS
SYSTOLIC BLOOD PRESSURE: 122 MMHG | HEART RATE: 76 BPM | DIASTOLIC BLOOD PRESSURE: 74 MMHG | BODY MASS INDEX: 28.79 KG/M2 | HEIGHT: 73 IN | TEMPERATURE: 97.8 F | OXYGEN SATURATION: 98 % | WEIGHT: 217.2 LBS

## 2023-03-09 DIAGNOSIS — Z95.5 STATUS POST INSERTION OF DRUG ELUTING CORONARY ARTERY STENT: ICD-10-CM

## 2023-03-09 DIAGNOSIS — E11.59 TYPE 2 DIABETES MELLITUS WITH OTHER CIRCULATORY COMPLICATION, WITHOUT LONG-TERM CURRENT USE OF INSULIN (HCC): Primary | ICD-10-CM

## 2023-03-09 DIAGNOSIS — I10 PRIMARY HYPERTENSION: ICD-10-CM

## 2023-03-09 DIAGNOSIS — E11.59 TYPE 2 DIABETES MELLITUS WITH OTHER CIRCULATORY COMPLICATION, WITHOUT LONG-TERM CURRENT USE OF INSULIN (HCC): ICD-10-CM

## 2023-03-09 DIAGNOSIS — E78.2 MIXED HYPERLIPIDEMIA: ICD-10-CM

## 2023-03-09 DIAGNOSIS — E11.21 TYPE 2 DIABETES MELLITUS WITH DIABETIC NEPHROPATHY, WITHOUT LONG-TERM CURRENT USE OF INSULIN (HCC): ICD-10-CM

## 2023-03-09 LAB — SL AMB POCT HEMOGLOBIN AIC: 8.3 (ref ?–6.5)

## 2023-03-09 NOTE — PATIENT INSTRUCTIONS
Please call your insurance and inquire which of the following would be covered    - GLP-1  Agonist-Victoza, Bydureon, Byetta, ozempic, trulicity, ryblesus

## 2023-03-09 NOTE — PROGRESS NOTES
New Patient Progress Note      Cc: diabetes    Referring Provider  No referring provider defined for this encounter  History of Present Illness:   Heidi Shah is a 62 y o  male with a history of type 2 diabetes without long term use of insulin since 2010 who was referred after his recent admission for stent placement  Patient was admitted in March 2023 in [address], status post 2 mid LAD stents placement  Reports complications of CAD s/p stent  Denies recent illness or hospitalizations  Denies recent severe hypoglycemic or severe hyperglycemic episodes  Denies any issues with his current regimen  home glucose monitoring: are performed sporadically      Current regimen:   Metformin 1000 mg bid  Januvia 50 mg once daily      Home blood glucose readings:   Before breakfast: 150 - 200   Before lunch: x  Before dinner: x  Bedtime: x    Diabetes education: YES/NO  Date -   Breakfast: coffee, muffin with peanut butter at 4:30 pm  Lunch: roasted chicken, cold cut sandwich   Dinner: vegetables, fish, chicken,      Physically active: Yes           Opthamology: overdue,   Podiatry: NO      Has hypertension: followed by PCP; on Irbersartan  Has hyperlipidemia: followed by PCP; on Cretor 20 mg  - tolerating well, no myalgias      Thyroid disorders: No  History of pancreatitis: No,     Patient Active Problem List   Diagnosis   • Coronary artery disease involving native coronary artery of native heart without angina pectoris   • 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, greater than 8 weeks   • Essential hypertension   • Hypercholesterolemia   • Status post insertion of drug eluting coronary artery stent      Past Medical History:   Diagnosis Date   • Cardiac disease    • Diabetes mellitus ([facility] [address] )    • Hyperlipidemia    • Hypertension    • MI (myocardial infarction) [facility])       Past Surgical History:   Procedure Laterality Date   • CARDIAC CATHETERIZATION N/A 3/6/2023    Procedure: Cardiac catheterization;  Surgeon: Meagan Hendrix MD;  Location: [facility]; Service: Cardiology   • CARDIAC CATHETERIZATION N/A 3/6/2023    Procedure: Cardiac pci;  Surgeon: Meagan Hendrix MD;  Location: [facility]; Service: Cardiology   • CARDIAC CATHETERIZATION N/A 3/6/2023    Procedure: Cardiac Coronary Angiogram;  Surgeon: Meagan Hendrix MD;  Location: [facility]; Service: Cardiology   • CARDIAC SURGERY     • CORONARY ANGIOPLASTY WITH STENT PLACEMENT     • DEBRIDEMENT TENNIS ELBOW     • HERNIA REPAIR     • KNEE ARTHROSCOPY     • SHOULDER ARTHROSCOPY     • VASECTOMY        No family history on file    Social History     Tobacco Use   • Smoking status: Former     Types: Cigarettes     Start date:      Quit date:      Years since quittin 1   • Smokeless tobacco: Former     Types: Chew     Quit date:    Substance Use Topics   • Alcohol use: Yes     Comment: socially     Allergies   Allergen Reactions   • Banana - Food Allergy Throat Swelling   • Tetanus Toxoid Other (See Comments)         Current Outpatient Medications:   •  aspirin (ECOTRIN LOW STRENGTH) 81 mg EC tablet, Take 1 tablet by mouth daily, Disp: , Rfl:   •  Cholecalciferol (VITAMIN D) 2000 units CAPS, Take by mouth, Disp: , Rfl:   •  irbesartan (AVAPRO) 75 mg tablet, , Disp: , Rfl:   •  JANUVIA 50 MG tablet, , Disp: , Rfl:   •  metFORMIN (GLUCOPHAGE) 1000 MG tablet, 2 (two) times a day with meals , Disp: , Rfl:   •  metoprolol succinate (TOPROL-XL) 25 mg 24 hr tablet, Take 1 tablet (25 mg total) by mouth daily Do not start before 2023 , Disp: 30 tablet, Rfl: 0  •  rosuvastatin (CRESTOR) 20 MG tablet, Take 0 5 tablets (10 mg total) by mouth daily, Disp: 30 tablet, Rfl: 2  •  tadalafil (CIALIS) 10 MG tablet, Take 10 mg by mouth as needed, Disp: , Rfl:   •  ticagrelor (BRILINTA) 90 MG, Take 1 tablet (90 mg total) by mouth 2 (two) times a day, Disp: 60 tablet, Rfl: 5  •  zolpidem (AMBIEN) 10 mg tablet, one tab at bedtime as needed, Disp: , Rfl:   Review of Systems   Constitutional: Positive for appetite change  Negative for activity change, chills, diaphoresis, fatigue, fever and unexpected weight change  HENT: Negative for congestion, drooling, ear discharge, ear pain, trouble swallowing and voice change  Eyes: Negative for photophobia, pain, discharge, redness, itching and visual disturbance  Respiratory: Negative for cough, chest tightness, shortness of breath and wheezing  Cardiovascular: Negative for chest pain, palpitations and leg swelling  Gastrointestinal: Negative for abdominal distention, abdominal pain, blood in stool, constipation, diarrhea, nausea and vomiting  Endocrine: Positive for polydipsia and polyuria  Negative for cold intolerance, heat intolerance and polyphagia  Genitourinary: Negative for dysuria, flank pain, frequency and hematuria  Musculoskeletal: Negative for back pain, gait problem, joint swelling, myalgias, neck pain and neck stiffness  Skin: Negative for color change, pallor, rash and wound  Neurological: Negative for dizziness, tremors, seizures, syncope, speech difficulty, weakness, numbness and headaches  Psychiatric/Behavioral: Negative for agitation  All other systems reviewed and are negative  Physical Exam:  Body mass index is 28 76 kg/m²  /74 (BP Location: Left arm, Patient Position: Sitting, Cuff Size: Standard)   Pulse 76   Temp 97 8 °F (36 6 °C) (Temporal)   Ht 6' 1" (1 854 m)   SpO2 98%   BMI 28 76 kg/m²    Wt Readings from Last 3 Encounters:   03/06/23 98 9 kg (218 lb)   02/27/23 101 kg (222 lb)   08/29/22 97 5 kg (215 lb)       GEN: NAD    E/n/m nl facies, hearing intact bilat, tongue midline, lips nl  Eyes: no stare or proptosis, nl lids and conjunctiva, EOMI  Neck: trachea midline, thyroid NT to palpation, nl in size, no nodules or neck masses noted, no cervical LAD  CV; heart reg rate s1s2 nl, no m/r/g appreciated, no AIDA  Resp: CTAB, good effort  Ab+BS  Neuro: no tremor, 2+ DTRs in BUE  MS: no c/c in digits, moves all 4 ext, nl muscle bulk, gait nl  Skin: warm and dry, no palmar erythema  Psych: nl mood and affect, no gross lapses in memory  Patient's shoes and socks removed  Right Foot/Ankle   Right Foot Inspection  Skin Exam: skin normal  Skin not intact, no dry skin, no warmth, no callus, no erythema, no maceration, no abnormal color, no pre-ulcer, no ulcer and no callus  Toe Exam: No swelling, no tenderness, erythema and  no right toe deformity    Sensory   Vibration: intact  Proprioception: intact  Monofilament testing: intact    Vascular  Capillary refills: < 3 seconds  The right DP pulse is 2+  The right PT pulse is 2+  Left Foot/Ankle  Left Foot Inspection  Skin Exam: skin normal  Skin not intact, no dry skin, no warmth, no erythema, no maceration, normal color, no pre-ulcer, no ulcer and no callus  Toe Exam: No swelling, no tenderness, no erythema and no left toe deformity  Sensory   Vibration: intact  Proprioception: intact  Monofilament testing: intact    Vascular  Capillary refills: < 3 seconds  The left DP pulse is 2+  The left PT pulse is 2+       Assign Risk Category  No deformity present  No loss of protective sensation  No weak pulses  Risk: 0    Labs:   No components found for: HA1C  No components found for: GLU    Lab Results   Component Value Date    CREATININE 0 92 03/07/2023    CREATININE 0 99 04/02/2021    CREATININE 0 93 10/12/2019    BUN 14 03/07/2023     05/26/2018    K 4 0 03/07/2023     03/07/2023    CO2 26 03/07/2023     eGFR   Date Value Ref Range Status   03/07/2023 91 ml/min/1 73sq m Final     No components found for: [facility]    Lab Results   Component Value Date    CHOL 141 05/26/2018    HDL 28 (L) 04/02/2021    TRIG 272 (H) 04/02/2021       Lab Results   Component Value Date    ALT 25 04/02/2021    AST 18 04/02/2021    ALKPHOS 63 04/02/2021    BILITOT 0 7 05/26/2018       No results found for: TSH, FREET4, TSI    Impression:  1  Type 2 diabetes mellitus with other circulatory complication, without long-term current use of insulin ([address] )    2  Primary hypertension    3  Mixed hyperlipidemia    4  Status post insertion of drug eluting coronary artery stent           Plan:    Ursula Stubbs was seen today for consult  Diagnoses and all orders for this visit:    Type 2 diabetes mellitus with other circulatory complication, without long-term current use of insulin ([address] )  Uncontrolled with the most recent A1c of 8 3%  We reviewed pathophysiology of type 2 diabetes, importance of glycemic control in order to prevent complications  Counseled on adverse side effects of SGLT-2 inhibitors therapy including increased urinary frequency, risk of urinary tract infection, risk of yeast infection, risk of Brayden's gangrene  He understood these risks and wished to start therapy  I started Jardiance 25 mg once daily, he was advised to keep himself hydrated  counseled on adverse side effects of GLP-1 agonist including nausea, vomiting, pancreatitis, medullary thyroid cancer, gallstones, cholecystitis  No FHx of MEN2  He understood these risks and wished to start therapy  I gave patient a list of GLP-1 agonists and he will inquire from his insurance for coverage  Given CAD and stents placement he is a good candidate for this to class medications  I have asked the patient to check blood sugars 2-3 daily and send a record to the office in few weeks for review so that changes can be made to regimen, if applicable  We provided information on basic nutrition for diabetics  Extended counseling on disease management  Emphasized importance of daily exercise, weight loss, caloric reduction, small meals, consumption of multiple servings of fruits and vegetables and avoidance of concentrated sweets such as juice and soda     Reviewed concepts of diabetes self-management stressing the primary role of the patient in monitoring and maintaining control of diabetes  Return back in 3 months  Labs prior to next visit    -     POCT hemoglobin A1c  -     Empagliflozin 25 MG TABS; Take 1 tablet (25 mg total) by mouth every morning  -     Hemoglobin A1C; Future  -     Comprehensive metabolic panel; Future  -     Lipid Panel with Direct LDL reflex; Future  -     Vitamin D 25 hydroxy; Future  -     Microalbumin / creatinine urine ratio; Future    Primary hypertension:  Blood pressure is at goal, today's blood pressure 122/75  Continue current regimen  -     Comprehensive metabolic panel; Future  -     Microalbumin / creatinine urine ratio; Future    Mixed hyperlipidemia:  He is on Crestor 10 mg once daily  He is taking it regularly tolerating well  -     Lipid Panel with Direct LDL reflex; Future    Status post insertion of drug eluting coronary artery stent  SGLT inhibitor started and he will inquire from his insurance if GLP-1 agonist is covered so we will be added to his regimen  Discussed with the patient and all questioned fully answered  He will call me if any problems arise      Counseled patient on diagnostic results, prognosis, risk and benefit of treatment options, instruction for management, importance of treatment compliance, Risk  factor reduction and impressions      Hussein Mahoney MD

## 2023-03-09 NOTE — TELEPHONE ENCOUNTER
Great !   I ordered Ozempic which he will take 0 25 mg once weekly for 4 weeks and if he tolerates will increase to 0 5 mg once weekly  Discontinue Januvia when you start Ozempic  Prescription was called to his pharmacy  Voicemail left for patient to give office a call back to give updated instruction

## 2023-03-09 NOTE — TELEPHONE ENCOUNTER
Pt called back and asked which pharmacy we sent this too [facility]   Patient let me know that they no longer use [facility] and wants this medication sent to [facility] in [place]

## 2023-03-09 NOTE — TELEPHONE ENCOUNTER
Pt called his insurance  Ozempic, Trulicity and Rybelsus  Per insurance company patient is authorized for #90 day supply refill on all medications on mail order through [email or web address]

## 2023-03-18 NOTE — PROGRESS NOTES
Cardiology Follow Up    Jacquelin James  1965  [id number]  [address] [facility] [id number] [phone]    1  S/P drug eluting coronary stent placement  Echo complete w/ contrast if indicated    irbesartan (AVAPRO) 75 mg tablet      2  Coronary artery disease involving native coronary artery of native heart without angina pectoris  irbesartan (AVAPRO) 75 mg tablet    metoprolol succinate (TOPROL-XL) 25 mg 24 hr tablet      3  Primary hypertension  Echo complete w/ contrast if indicated    irbesartan (AVAPRO) 75 mg tablet      4  Hypercholesterolemia  Lipid panel    irbesartan (AVAPRO) 75 mg tablet    rosuvastatin (CRESTOR) 40 MG tablet    DISCONTINUED: rosuvastatin (CRESTOR) 40 MG tablet      5  Type 2 diabetes mellitus with other specified complication, without long-term current use of insulin ([facility])  metoprolol succinate (TOPROL-XL) 25 mg 24 hr tablet    ticagrelor (BRILINTA) 90 MG          Interval History:   On 2/27/23 Mr Kiki Priest was seen in our office with complaints of a 2 week history of left sided chest pain with exertion such as  riding a bicycle and during intimacy  He admits to increased Heart burn and shortness of breath with exertion  He was scheduled for a LHC with Dr Tiffany Reddy  Mr Kiki Priest was admitted to [facility] on 3/06 - 3/07/23 sp LACEY  On 3/06/23 LHC showed Mid LAD 80% stenosis, Distal RCA 70% stenosis, RPDA 50% stenosis, RPAA 50% stenosis  Sp angioplasty x 4 with Xience skypoint LACEY x 2 to 80% stenosis of mid LAD, 0% residual stenosis  He was discharged on Brilinta 90mg BID, ASA 81mg daily, Crestor increased to 40mg daily, Metoprolol succinate 25mg daily, cardiac rehabilitation  Mr Kiki Priest presents to our office for a recent hospitalization follow up visit  He denies CP, palpitations, lightheadedness or dizziness    Monica Pascal admits to occasional feeling like he cannot take a deep breath  Medical History   Primary cardiologist Dr Kenrick Carbajal  IWMI  CAD with prior stenting to RCA in 2015   Hypertension  Hypercholesterolemia 21 , , HDL 28, LDL 84   HgbA1C 8 3 on 3/09/23   Former smoker      19 exercise stress test duration 11 minutes 19 seconds target heart rate was achieved and no chest pain during stress  ECG conclusion negative for ischemia     21 TTE: Left ventricular systolic function normal LVEF 55% severe hypokinesis of the basal mid inferior wall  Wall thickness upper limits of normal   Right ventricular size upper limits of normal systolic function normal   Trace MR  Trace TR    Patient Active Problem List   Diagnosis   • Coronary artery disease involving native coronary artery of native heart without angina pectoris   • 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, greater than 8 weeks   • Primary hypertension   • Mixed hyperlipidemia   • Status post insertion of drug eluting coronary artery stent   • Type 2 diabetes mellitus with circulatory disorder, without long-term current use of insulin (HCC)     Past Medical History:   Diagnosis Date   • Cardiac disease    • Diabetes mellitus ([facility] )    • Hyperlipidemia    • Hypertension    • MI (myocardial infarction) ([facility] )      Social History     Socioeconomic History   • Marital status:       Spouse name: Not on file   • Number of children: Not on file   • Years of education: Not on file   • Highest education level: Not on file   Occupational History   • Not on file   Tobacco Use   • Smoking status: Former     Types: Cigarettes     Start date: 0     Quit date:      Years since quittin 2   • Smokeless tobacco: Former     Types: Chew     Quit date:    Vaping Use   • Vaping Use: Never used   Substance and Sexual Activity   • Alcohol use: Yes     Comment: socially   • Drug use: No   • Sexual activity: Yes   Other Topics Concern   • Not on file   Social History Narrative   • Not on file     Social Determinants of Health     Financial Resource Strain: Not on file   Food Insecurity: Not on file   Transportation Needs: Not on file   Physical Activity: Not on file   Stress: Not on file   Social Connections: Not on file   Intimate Partner Violence: Not on file   Housing Stability: Not on file      No family history on file  Past Surgical History:   Procedure Laterality Date   • CARDIAC CATHETERIZATION N/A 3/6/2023    Procedure: Cardiac catheterization;  Surgeon: Samantha Chino MD;  Location: [facility]; Service: Cardiology   • CARDIAC CATHETERIZATION N/A 3/6/2023    Procedure: Cardiac pci;  Surgeon: Samantha Chino MD;  [facility]: [facility]; Service: Cardiology   • CARDIAC CATHETERIZATION N/A 3/6/2023    Procedure: Cardiac Coronary Angiogram;  Surgeon: Samantha Chino MD;  Location: [facility];   Service: Cardiology   • CARDIAC SURGERY     • CORONARY ANGIOPLASTY WITH STENT PLACEMENT     • DEBRIDEMENT TENNIS ELBOW     • HERNIA REPAIR     • KNEE ARTHROSCOPY     • SHOULDER ARTHROSCOPY     • VASECTOMY         Current Outpatient Medications:   •  aspirin (ECOTRIN LOW STRENGTH) 81 mg EC tablet, Take 1 tablet by mouth daily, Disp: , Rfl:   •  Cholecalciferol (VITAMIN D) 2000 units CAPS, Take by mouth (Patient not taking: Reported on 3/9/2023), Disp: , Rfl:   •  Empagliflozin 25 MG TABS, Take 1 tablet (25 mg total) by mouth every morning, Disp: 30 tablet, Rfl: 2  •  irbesartan (AVAPRO) 75 mg tablet, , Disp: , Rfl:   •  metFORMIN (GLUCOPHAGE) 1000 MG tablet, 2 (two) times a day with meals , Disp: , Rfl:   •  metoprolol succinate (TOPROL-XL) 25 mg 24 hr tablet, Take 1 tablet (25 mg total) by mouth daily Do not start before March 8, 2023 , Disp: 30 tablet, Rfl: 0  •  rosuvastatin (CRESTOR) 20 MG tablet, Take 0 5 tablets (10 mg total) by mouth daily, Disp: 30 tablet, Rfl: 2  •  semaglutide, 0 25 or 0 5 mg/dose, (Ozempic) 2 mg/1 5 mL injection pen, 0 25 mg once weekly for 4 weeks and then 0 5 mg once weekly, Disp: 3 mL, Rfl: 2  •  tadalafil (CIALIS) 10 MG tablet, Take 10 mg by mouth as needed, Disp: , Rfl:   •  ticagrelor (BRILINTA) 90 MG, Take 1 tablet (90 mg total) by mouth 2 (two) times a day, Disp: 60 tablet, Rfl: 5  •  zolpidem (AMBIEN) 10 mg tablet, one tab at bedtime as needed, Disp: , Rfl:   Allergies   Allergen Reactions   • Banana - Food Allergy Throat Swelling   • Tetanus Toxoid Other (See Comments)       Labs:  Office Visit on 03/09/2023   Component Date Value   • Hemoglobin A1C 03/09/2023 8 3 (A)    Admission on 03/06/2023, Discharged on 03/07/2023   Component Date Value   • Ventricular Rate 03/06/2023 72    • Atrial Rate 03/06/2023 72    • MS Interval 03/06/2023 222    • QRSD Interval 03/06/2023 92    • QT Interval 03/06/2023 378    • QTC Interval 03/06/2023 413    • P Axis 03/06/2023 24    • QRS Axis 03/06/2023 23    • T Wave Axis 03/06/2023 0    • Activated Clotting Time,* 03/06/2023 246 (H)    • Specimen Type 03/06/2023 VENOUS    • Activated Clotting Time,* 03/06/2023 226 (H)    • Specimen Type 03/06/2023 VENOUS    • WBC 03/07/2023 11 00 (H)    • RBC 03/07/2023 4 69    • Hemoglobin 03/07/2023 14 5    • Hematocrit 03/07/2023 42 4    • MCV 03/07/2023 90    • MCH 03/07/2023 30 9    • MCHC 03/07/2023 34 2    • RDW 03/07/2023 11 7    • Platelets 13/94/4958 205    • MPV 03/07/2023 10 6    • Sodium 03/07/2023 136    • Potassium 03/07/2023 4 0    • Chloride 03/07/2023 103    • CO2 03/07/2023 26    • ANION GAP 03/07/2023 7    • BUN 03/07/2023 14    • Creatinine 03/07/2023 0 92    • Glucose 03/07/2023 227 (H)    • Glucose, Fasting 03/07/2023 227 (H)    • Calcium 03/07/2023 9 2    • eGFR 03/07/2023 91      Imaging: Cardiac catheterization    Result Date: 3/6/2023  Narrative: •  Left Anterior Descending The vessel was visualized by angiography and is moderate in size  There are faint L to R collateral to the distal RCA system  Mid LAD lesion is 80% stenosed  Culprit lesion  Culprit for anginal symptoms  SYBIL flow is 3  The lesion is eccentric, irregular and diffuse  iFR was measured in the Mid LAD  iFR ratio: 0 89  The iFR was significant, intervened  •  Drug-eluting stent was successfully placed  The stent used was a STENT XIENCE SKYPOINT 2 75 X 38MM •  The intervention was successful  The guidewire crossed the lesion  Post-intervention SYBIL flow is 3  Lesion had 50 mm of its length treated  There were no complications  There is a 0% residual stenosis post intervention  Review of Systems:  Review of Systems   All other systems reviewed and are negative  Physical Exam:  Physical Exam  Vitals reviewed  Constitutional:       Appearance: Normal appearance  Cardiovascular:      Rate and Rhythm: Normal rate  Pulses: Normal pulses  Heart sounds: Normal heart sounds  Pulmonary:      Effort: Pulmonary effort is normal       Breath sounds: Normal breath sounds  Abdominal:      General: Bowel sounds are normal       Palpations: Abdomen is soft  Musculoskeletal:         General: Normal range of motion  Cervical back: Normal range of motion and neck supple  Right lower leg: No edema  Left lower leg: No edema  Skin:     General: Skin is warm and dry  Capillary Refill: Capillary refill takes less than 2 seconds  Comments: Right radial cath site appears healed    Neurological:      General: No focal deficit present  Mental Status: He is alert and oriented to person, place, and time  Psychiatric:         Mood and Affect: Mood normal          Behavior: Behavior normal          Discussion/Summary:  1  3/07/23 Sp angioplasty x 4 with Xience skypoint LACEY x 2 to 80% stenosis of mid LAD, 0% residual stenosis  Continue on ASA 81mg daily, Brilinta 90mg BID, Autumn Damon is aware not to stop for any reason    Continue on metoprolol Succinate 25mg daily, Avapro 75mg daily, DASH diet, Autumn Damon cannot undergo cardiac rehabilitation due to work schedule,  complaining of occasional inability to take a deep breath,most likely due to adverse reaction from [place]  Instructed to sip caffeine after taking Brilinta  He is riding his bike on a flat surgace without difficulty  2 D echocardiogram to be done in near future evaluate LVEF   2  CAD 3/07/23 [facility] showed non obstructive CAD Distal RCA 70% stenosis, RPDA 50% stenosis, RPAA 50% stenosis  Hx ofCAD with prior stenting to RCA in 11/2015 , increase Crestor to 40mg daily, Continue on Metoprolol succinate 25mg daily, Avapro 75mg daily, ASA 81mg daily and Brilinta 90mg BID  3  Hypertension RUE sitting 130/60 controlled on   4  Hypercholesterolemia 4/02/21 , , HDL 28, LDL 84 - non obstructive CAD, increase Crestor from 20mg daily to 40mg daily, lipid panel in 6 months  DASH diet   5   DM2 HgbA1C 8 3 on 3/09/23 follow up with endocrinology

## 2023-03-20 ENCOUNTER — OFFICE VISIT (OUTPATIENT)
Dept: CARDIOLOGY CLINIC | Facility: CLINIC | Age: 58
End: 2023-03-20

## 2023-03-20 VITALS
DIASTOLIC BLOOD PRESSURE: 72 MMHG | SYSTOLIC BLOOD PRESSURE: 104 MMHG | OXYGEN SATURATION: 97 % | HEIGHT: 72 IN | WEIGHT: 216 LBS | HEART RATE: 72 BPM | BODY MASS INDEX: 29.26 KG/M2

## 2023-03-20 DIAGNOSIS — I25.10 CORONARY ARTERY DISEASE INVOLVING NATIVE CORONARY ARTERY OF NATIVE HEART WITHOUT ANGINA PECTORIS: ICD-10-CM

## 2023-03-20 DIAGNOSIS — Z95.5 S/P DRUG ELUTING CORONARY STENT PLACEMENT: Primary | ICD-10-CM

## 2023-03-20 DIAGNOSIS — E11.69 TYPE 2 DIABETES MELLITUS WITH OTHER SPECIFIED COMPLICATION, WITHOUT LONG-TERM CURRENT USE OF INSULIN (HCC): ICD-10-CM

## 2023-03-20 DIAGNOSIS — E78.00 HYPERCHOLESTEROLEMIA: ICD-10-CM

## 2023-03-20 DIAGNOSIS — I10 PRIMARY HYPERTENSION: ICD-10-CM

## 2023-03-20 RX ORDER — ROSUVASTATIN CALCIUM 40 MG/1
40 TABLET, COATED ORAL DAILY
Qty: 90 TABLET | Refills: 3 | Status: SHIPPED | OUTPATIENT
Start: 2023-03-20 | End: 2023-03-20 | Stop reason: SDUPTHER

## 2023-03-20 RX ORDER — METOPROLOL SUCCINATE 25 MG/1
25 TABLET, EXTENDED RELEASE ORAL DAILY
Qty: 90 TABLET | Refills: 3 | Status: SHIPPED | OUTPATIENT
Start: 2023-03-20

## 2023-03-20 RX ORDER — IRBESARTAN 75 MG/1
75 TABLET ORAL DAILY
Qty: 90 TABLET | Refills: 3 | Status: SHIPPED | OUTPATIENT
Start: 2023-03-20

## 2023-03-20 RX ORDER — ROSUVASTATIN CALCIUM 40 MG/1
40 TABLET, COATED ORAL DAILY
Qty: 120 TABLET | Refills: 3 | Status: SHIPPED | OUTPATIENT
Start: 2023-03-20

## 2023-03-27 ENCOUNTER — DOCUMENTATION (OUTPATIENT)
Dept: CARDIOLOGY CLINIC | Facility: CLINIC | Age: 58
End: 2023-03-27

## 2023-03-27 NOTE — PROGRESS NOTES
[facility] GROUP/DISABILITY PAPERWORK COMPLETED AND SIGNED BY EBONI Melvin [address], [address]  INCLUDED RECENT OV NOTE    FAXED [phone]

## 2023-06-11 DIAGNOSIS — E11.21 TYPE 2 DIABETES MELLITUS WITH DIABETIC NEPHROPATHY, WITHOUT LONG-TERM CURRENT USE OF INSULIN (HCC): ICD-10-CM

## 2023-06-12 RX ORDER — EMPAGLIFLOZIN 25 MG/1
TABLET, FILM COATED ORAL
Qty: 30 TABLET | Refills: 1 | Status: SHIPPED | OUTPATIENT
Start: 2023-06-12

## 2023-06-20 ENCOUNTER — OFFICE VISIT (OUTPATIENT)
Dept: ENDOCRINOLOGY | Facility: CLINIC | Age: 58
End: 2023-06-20
Payer: COMMERCIAL

## 2023-06-20 VITALS
SYSTOLIC BLOOD PRESSURE: 122 MMHG | DIASTOLIC BLOOD PRESSURE: 80 MMHG | WEIGHT: 208 LBS | BODY MASS INDEX: 28.17 KG/M2 | HEART RATE: 84 BPM | TEMPERATURE: 97.7 F | OXYGEN SATURATION: 96 % | RESPIRATION RATE: 16 BRPM | HEIGHT: 72 IN

## 2023-06-20 DIAGNOSIS — R80.9 MICROALBUMINURIA: ICD-10-CM

## 2023-06-20 DIAGNOSIS — I10 PRIMARY HYPERTENSION: ICD-10-CM

## 2023-06-20 DIAGNOSIS — E55.9 VITAMIN D DEFICIENCY: ICD-10-CM

## 2023-06-20 DIAGNOSIS — E11.59 TYPE 2 DIABETES MELLITUS WITH OTHER CIRCULATORY COMPLICATION, WITHOUT LONG-TERM CURRENT USE OF INSULIN (HCC): Primary | ICD-10-CM

## 2023-06-20 DIAGNOSIS — E78.2 MIXED HYPERLIPIDEMIA: ICD-10-CM

## 2023-06-20 PROCEDURE — 99214 OFFICE O/P EST MOD 30 MIN: CPT | Performed by: STUDENT IN AN ORGANIZED HEALTH CARE EDUCATION/TRAINING PROGRAM

## 2023-06-20 RX ORDER — SEMAGLUTIDE 0.68 MG/ML
INJECTION, SOLUTION SUBCUTANEOUS
COMMUNITY
Start: 2023-05-19

## 2023-06-20 NOTE — PROGRESS NOTES
Established patient Progress Note      Cc: diabetes    Referring Provider  No referring provider defined for this encounter  History of Present Illness:   Chester Cabrera is a 62 y o  male with a history of type 2 diabetes without long term use of insulin since 2010 who presented for follow-up  Last seen in the office in March 2023  Reports complications of CAD status post stent placement  Denies recent illness or hospitalizations  Denies recent severe hypoglycemic or severe hyperglycemic episodes   Denies any issues with his current regimen  home glucose monitoring: are performed regularly      Current regimen:   Metformin 1000 mg twice a day  Jardiance 25 mg once a day  Ozempic 1 mg once weekly    Home blood glucose readings:   Before breakfast: 100 -130   Before lunch: below 150   Before dinner: below 150   Bedtime: x    Opthamology: UTD  Podiatry: NO        Has hypertension: followed by PCP; on Irbesartan  Has hyperlipidemia: followed by PCP; on Cretor 40 mg  - tolerating well, no myalgias      Thyroid disorders: No  History of pancreatitis: No,       Patient Active Problem List   Diagnosis   • Coronary artery disease involving native coronary artery of native heart without angina pectoris   • 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, greater than 8 weeks   • Primary hypertension   • Mixed hyperlipidemia   • Status post insertion of drug eluting coronary artery stent   • Type 2 diabetes mellitus with circulatory disorder, without long-term current use of insulin ([facility] )      Past Medical History:   Diagnosis Date   • Cardiac disease    • Diabetes mellitus ([facility] )    • Hyperlipidemia    • Hypertension    • MI (myocardial infarction) [facility])       Past Surgical History:   Procedure Laterality Date   • CARDIAC CATHETERIZATION N/A 3/6/2023    Procedure: Cardiac catheterization;  Surgeon: Hailey Garcia Mireya Rios MD;  [facility]; Service: Cardiology   • CARDIAC CATHETERIZATION N/A 3/6/2023    Procedure: Cardiac pci;  Surgeon: Pati Morales MD;  [facility]: [facility]; Service: Cardiology   • CARDIAC CATHETERIZATION N/A 3/6/2023    Procedure: Cardiac Coronary Angiogram;  Surgeon: Pati Morales MD;  [facility]: [facility]; Service: Cardiology   • CARDIAC SURGERY     • CORONARY ANGIOPLASTY WITH STENT PLACEMENT     • DEBRIDEMENT TENNIS ELBOW     • HERNIA REPAIR     • KNEE ARTHROSCOPY     • SHOULDER ARTHROSCOPY     • VASECTOMY        No family history on file    Social History     Tobacco Use   • Smoking status: Former     Types: Cigarettes     Start date:      Quit date:      Years since quittin 4   • Smokeless tobacco: Former     Types: Chew     Quit date:    Substance Use Topics   • Alcohol use: Yes     Comment: socially     Allergies   Allergen Reactions   • Banana - Food Allergy Throat Swelling   • Tetanus Toxoid Other (See Comments)         Current Outpatient Medications:   •  aspirin (ECOTRIN LOW STRENGTH) 81 mg EC tablet, Take 1 tablet by mouth daily, Disp: , Rfl:   •  Cholecalciferol (VITAMIN D) 2000 units CAPS, Take by mouth (Patient not taking: Reported on 3/9/2023), Disp: , Rfl:   •  irbesartan (AVAPRO) 75 mg tablet, Take 1 tablet (75 mg total) by mouth daily, Disp: 90 tablet, Rfl: 3  •  Jardiance 25 MG TABS, TAKE 1 TABLET BY MOUTH EVERY DAY IN THE MORNING, Disp: 30 tablet, Rfl: 1  •  metFORMIN (GLUCOPHAGE) 1000 MG tablet, Take 1,000 mg by mouth daily with breakfast, Disp: , Rfl:   •  metoprolol succinate (TOPROL-XL) 25 mg 24 hr tablet, Take 1 tablet (25 mg total) by mouth daily, Disp: 90 tablet, Rfl: 3  •  Ozempic, 0 25 or 0 5 MG/DOSE, 2 MG/3ML injection pen, INJECT 0 5 MG UNDER THE SKIN ONCE WEEKLY, Disp: , Rfl:   •  rosuvastatin (CRESTOR) 40 MG tablet, Take 1 tablet (40 mg total) by mouth daily, Disp: 120 tablet, Rfl: 3  •  semaglutide, 0 25 or 0 5 mg/dose, (Ozempic) 2 mg/1 5 mL injection pen, 0 25 mg once weekly for 4 weeks and then 0 5 mg once weekly, Disp: 3 mL, Rfl: 2  •  tadalafil (CIALIS) 10 MG tablet, Take 10 mg by mouth as needed, Disp: , Rfl:   •  ticagrelor (BRILINTA) 90 MG, Take 1 tablet (90 mg total) by mouth 2 (two) times a day, Disp: 180 tablet, Rfl: 3  •  zolpidem (AMBIEN) 10 mg tablet, one tab at bedtime as needed, Disp: , Rfl:   Review of Systems   Constitutional: Negative for appetite change, fatigue and unexpected weight change  HENT: Negative for trouble swallowing and voice change  Eyes: Negative for visual disturbance  Respiratory: Negative for cough, shortness of breath and wheezing  Cardiovascular: Negative for palpitations and leg swelling  Gastrointestinal: Negative for abdominal pain, constipation, diarrhea, nausea and vomiting  Endocrine: Negative for cold intolerance, heat intolerance, polyphagia and polyuria  Musculoskeletal: Negative for arthralgias  Skin: Negative for color change, rash and wound  Neurological: Negative for dizziness, tremors, weakness, light-headedness, numbness and headaches  Psychiatric/Behavioral: Negative for agitation and sleep disturbance  The patient is not nervous/anxious  Physical Exam:  There is no height or weight on file to calculate BMI  There were no vitals taken for this visit     Wt Readings from Last 3 Encounters:   04/10/23 98 kg (216 lb)   03/20/23 98 kg (216 lb)   03/09/23 98 5 kg (217 lb 3 2 oz)       GEN: NAD  E/n/m nl facies,   Eyes: no stare or proptosis,   Neck: trachea midline, thyroid NT to palpation, nl in size, no nodules or neck masses noted, no cervical LAD  CV; heart reg rate s1s2 nl, no m/r/g appreciated, no AIDA  Resp: CTAB, good effort  Ab+BS  Neuro: no tremor, 2+ DTRs in BUE  MS: no c/c in digits, moves all 4 ext, nl muscle bulk, gait nl  Skin: warm and dry, no palmar erythema  Ext: no c/c in digits, no edema bilaterally, 2+ DP and PT pulses bilat, "  Psych: nl mood and affect, no gross lapses in memory      Labs:   No components found for: \"HA1C\"  No components found for: \"GLU\"    Lab Results   Component Value Date    CREATININE 0 92 03/07/2023    CREATININE 0 99 04/02/2021    CREATININE 0 93 10/12/2019    BUN 14 03/07/2023     05/26/2018    K 4 0 03/07/2023     03/07/2023    CO2 26 03/07/2023     eGFR   Date Value Ref Range Status   03/07/2023 91 ml/min/1 73sq m Final     No components found for: \"MALBCRER\"    Lab Results   Component Value Date    CHOL 141 05/26/2018    HDL 28 (L) 04/02/2021    TRIG 272 (H) 04/02/2021       Lab Results   Component Value Date    ALT 25 04/02/2021    AST 18 04/02/2021    ALKPHOS 63 04/02/2021    BILITOT 0 7 05/26/2018     Component      Latest Ref Rng & Units 6/10/2023   Hemoglobin A1C      <5 7 % 6 7 (H)      Ref Range & Units 6/10/23  7:07 AM   Glucose 65 - 99 mg/dL 109 High     BUN 7 - 28 mg/dL 19    Creatinine 0 53 - 1 30 mg/dL 1 04    Sodium 135 - 145 mmol/L 139    Potassium 3 5 - 5 2 mmol/L 4 3    Chloride 100 - 109 mmol/L 101    Carbon Dioxide 21 - 31 mmol/L 26    Calcium 8 5 - 10 1 mg/dL 9 8    Alkaline Phosphatase 35 - 120 U/L 64    Albumin 3 5 - 5 7 g/dL 4 9    Bilirubin, Total 0 2 - 1 0 mg/dL 0 5    Comment: Eltrombopag and its metabolites may interfere with this assay causing erroneously high patient results     Protein, Total 6 3 - 8 3 g/dL 7 8    AST <41 U/L 17    ALT <56 U/L 15    Anion Gap 3 - 11 12 High     eGFRcr >59 83    eGFRcr Comment  Interpretive information: calculated      Cholesterol <200 mg/dL 93    Triglyceride <150 mg/dL 136    Cholesterol, HDL, Direct 23 - 92 mg/dL 25    Cholesterol, Non-HDL <160 mg/dL 68    CHOL/HDL Ratio  3 7    Cholesterol, LDL, Calculated <130 mg/dL 41      Creatinine, Urine 50 0 - 200 0 mg/dL 69 9    Albumin, Urine <3 0 mg/dL 6 0 High     Albumin/Creatinine Ratio, Urine <30 0 mg/gm CREA 85 8 High          No results found for: \"TSH\", \"FREET4\", " "\"TSI\"    Impression:  1  Type 2 diabetes mellitus with other circulatory complication, without long-term current use of insulin ([facility] )    2  Mixed hyperlipidemia    3  Primary hypertension    4  Microalbuminuria           Plan:    Kate Fisher was seen today for follow-up  Diagnoses and all orders for this visit:    Type 2 diabetes mellitus with other circulatory complication, without long-term current use of insulin ([facility] ):  Glycemic control improved significantly with A1c of 6 7%  I  increased Ozempic to 1 mg once weekly  Continue metformin and Jardiance at current dose  I have asked the patient to check blood sugars once or twcie daily and send a record to the office in few weeks for review so that changes can be made to regimen, if applicable  Return back in 3 months   Lab prior to next visit  -     Hemoglobin A1C; Future  -     Comprehensive metabolic panel; Future  -     Lipid Panel with Direct LDL reflex; Future    Mixed hyperlipidemia:  Continue Crestor 40 mg once daily   -     Lipid Panel with Direct LDL reflex; Future    Primary hypertension:  Controlled  -     Comprehensive metabolic panel; Future  -     Albumin / creatinine urine ratio; Future    Microalbuminuria:  See plan for type 2 diabetes  -     Albumin / creatinine urine ratio; Future    Vitamin D deficiency  -     Vitamin D 25 hydroxy; Future    Discussed with the patient and all questioned fully answered  He will call me if any problems arise      Counseled patient on diagnostic results, prognosis, risk and benefit of treatment options, instruction for management, importance of treatment compliance, Risk  factor reduction and impressions      Lei Kathleen MD  "

## 2023-06-27 ENCOUNTER — OFFICE VISIT (OUTPATIENT)
Dept: CARDIOLOGY CLINIC | Facility: CLINIC | Age: 58
End: 2023-06-27
Payer: COMMERCIAL

## 2023-06-27 VITALS
WEIGHT: 208 LBS | OXYGEN SATURATION: 97 % | HEART RATE: 80 BPM | DIASTOLIC BLOOD PRESSURE: 80 MMHG | SYSTOLIC BLOOD PRESSURE: 126 MMHG | BODY MASS INDEX: 28.21 KG/M2

## 2023-06-27 DIAGNOSIS — E78.2 MIXED HYPERLIPIDEMIA: ICD-10-CM

## 2023-06-27 DIAGNOSIS — I25.2 OLD MYOCARDIAL INFARCTION OF INFERIOR WALL, GREATER THAN 8 WEEKS: Primary | ICD-10-CM

## 2023-06-27 DIAGNOSIS — I25.10 CORONARY ARTERY DISEASE INVOLVING NATIVE CORONARY ARTERY OF NATIVE HEART WITHOUT ANGINA PECTORIS: ICD-10-CM

## 2023-06-27 DIAGNOSIS — I10 PRIMARY HYPERTENSION: ICD-10-CM

## 2023-06-27 PROCEDURE — 99214 OFFICE O/P EST MOD 30 MIN: CPT | Performed by: INTERNAL MEDICINE

## 2023-06-28 NOTE — PROGRESS NOTES
Cardiology Follow Up    Asif Gil  1965  [id number]  [address] [id number] [phone]    1  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, greater than 8 weeks        2  Coronary artery disease involving native coronary artery of native heart without angina pectoris        3  Primary hypertension        4  Mixed hyperlipidemia              Discussion/Summary: I explained to him that with his diffuse diabetic CAD, he needs to get his I2Z certainly below 6  He is controlled his BP and cholesterol very well  I will decrease his Toprol XL to 12 5 mg daily  RTO 4 months  Interval History: He does feel better since his mid LAD stenting on 3/6/2023  He is very active and notices that he is less winded  He denies exertional CP  He has diffuse diabetic CAD  He does have some fatigue  A1C 6 7    /80    LDL 41           Patient Active Problem List   Diagnosis   • Coronary artery disease involving native coronary artery of native heart without angina pectoris   • 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, greater than 8 weeks   • Primary hypertension   • Mixed hyperlipidemia   • Status post insertion of drug eluting coronary artery stent   • Type 2 diabetes mellitus with circulatory disorder, without long-term current use of insulin (HCC)   • Microalbuminuria     Past Medical History:   Diagnosis Date   • Cardiac disease    • Diabetes mellitus ([facility] [address] )    • Hyperlipidemia    • Hypertension    • MI (myocardial infarction) ([facility] [address] )      Social History     Socioeconomic History   • Marital status:       Spouse name: Not on file   • Number of children: Not on file   • Years of education: Not on file   • Highest education level: Not on file   Occupational History   • Not on file   Tobacco Use   • Smoking status: Former     Packs/day: 0 50     Years: 15 00     Total pack years: 7 50 Types: Cigarettes     Start date: 1977     Quit date: 2005     Years since quittin 4   • Smokeless tobacco: Former     Types: Chew     Quit date: 1985   Vaping Use   • Vaping Use: Never used   Substance and Sexual Activity   • Alcohol use: Yes     Comment: socially   • Drug use: No   • Sexual activity: Yes     Birth control/protection: Post-menopausal   Other Topics Concern   • Not on file   Social History Narrative   • Not on file     Social Determinants of Health     Financial Resource Strain: Not on file   Food Insecurity: Not on file   Transportation Needs: Not on file   Physical Activity: Not on file   Stress: Not on file   Social Connections: Not on file   Intimate Partner Violence: Not on file   Housing Stability: Not on file      Family History   Problem Relation Age of Onset   • Hypertension Maternal Grandfather    • Diabetes type I Maternal Grandmother    • Hypertension Paternal Grandfather    • Diabetes type II Paternal Grandmother      Past Surgical History:   Procedure Laterality Date   • CARDIAC CATHETERIZATION N/A 3/6/2023    Procedure: Cardiac catheterization;  Surgeon: Jason Jerome MD;  Location: [facility]; Service: Cardiology   • CARDIAC CATHETERIZATION N/A 3/6/2023    Procedure: Cardiac pci;  Surgeon: Jason Jerome MD;  [facility]; Service: Cardiology   • CARDIAC CATHETERIZATION N/A 3/6/2023    Procedure: Cardiac Coronary Angiogram;  Surgeon: Jason Jerome MD;  Location: [facility];   Service: Cardiology   • CARDIAC SURGERY     • CORONARY ANGIOPLASTY WITH STENT PLACEMENT     • DEBRIDEMENT TENNIS ELBOW     • HERNIA REPAIR     • KNEE ARTHROSCOPY     • SHOULDER ARTHROSCOPY     • VASECTOMY         Current Outpatient Medications:   •  aspirin (ECOTRIN LOW STRENGTH) 81 mg EC tablet, Take 1 tablet by mouth daily, Disp: , Rfl:   •  irbesartan (AVAPRO) 75 mg tablet, Take 1 tablet (75 mg total) by mouth daily, Disp: 90 tablet, Rfl: 3  • Jardiance 25 MG TABS, TAKE 1 TABLET BY MOUTH EVERY DAY IN THE MORNING, Disp: 30 tablet, Rfl: 1  •  metFORMIN (GLUCOPHAGE) 1000 MG tablet, Take 1,000 mg by mouth daily with breakfast, Disp: , Rfl:   •  metoprolol succinate (TOPROL-XL) 25 mg 24 hr tablet, Take 1 tablet (25 mg total) by mouth daily, Disp: 90 tablet, Rfl: 3  •  Ozempic, 0 25 or 0 5 MG/DOSE, 2 MG/3ML injection pen, INJECT 0 5 MG UNDER THE SKIN ONCE WEEKLY, Disp: , Rfl:   •  rosuvastatin (CRESTOR) 40 MG tablet, Take 1 tablet (40 mg total) by mouth daily, Disp: 120 tablet, Rfl: 3  •  ticagrelor (BRILINTA) 90 MG, Take 1 tablet (90 mg total) by mouth 2 (two) times a day, Disp: 180 tablet, Rfl: 3  •  zolpidem (AMBIEN) 10 mg tablet, one tab at bedtime as needed, Disp: , Rfl:   •  Cholecalciferol (VITAMIN D) 2000 units CAPS, Take by mouth (Patient not taking: Reported on 3/9/2023), Disp: , Rfl:   •  semaglutide, 0 25 or 0 5 mg/dose, (Ozempic) 2 mg/1 5 mL injection pen, 0 25 mg once weekly for 4 weeks and then 0 5 mg once weekly (Patient not taking: Reported on 6/20/2023), Disp: 3 mL, Rfl: 2  •  tadalafil (CIALIS) 10 MG tablet, Take 10 mg by mouth as needed, Disp: , Rfl:   Allergies   Allergen Reactions   • Banana - Food Allergy Throat Swelling   • Tetanus Toxoid Other (See Comments)     Vitals:    06/27/23 1536   BP: 126/80   BP Location: Right arm   Patient Position: Sitting   Cuff Size: Standard   Pulse: 80   SpO2: 97%   Weight: 94 3 kg (208 lb)     Weight (last 2 days)     Date/Time Weight    06/27/23 1536 94 3 (208)         Blood pressure 126/80, pulse 80, weight 94 3 kg (208 lb), SpO2 97 %  , Body mass index is 28 21 kg/m²      Labs:  Hospital Outpatient Visit on 04/10/2023   Component Date Value   • A4C EF 04/10/2023 60    • LVIDd 04/10/2023 4 50    • LVIDS 04/10/2023 3 00    • IVSd 04/10/2023 1 20    • LVPWd 04/10/2023 1 30    • FS 04/10/2023 33    • MV E' Tissue Velocity Se* 04/10/2023 9    • E wave deceleration time 04/10/2023 191    • MV Peak E Alejandro 04/10/2023 73    • MV Peak A Alejandro 04/10/2023 0 68    • RVID d 04/10/2023 4 3    • Tricuspid annular plane * 04/10/2023 2 60    • LA size 04/10/2023 4 1    • LA length (A2C) 04/10/2023 6 20    • RAA A4C 04/10/2023 22 9    • MV stenosis pressure 1/2* 04/10/2023 55    • MV valve area p 1/2 meth* 04/10/2023 4 00    • Ao root 04/10/2023 3 40    • Left ventricular stroke * 04/10/2023 61 00    • IVS 04/10/2023 1 2    • LEFT VENTRICLE SYSTOLIC * 32/31/5361 34    • LV DIASTOLIC VOLUME (MOD* 55/08/2563 95    • Left Atrium Area-systoli* 04/10/2023 21 4    • Left Atrium Area-systoli* 04/10/2023 25 4    • LVSV, 2D 04/10/2023 61    • LV EF 04/10/2023 55    Office Visit on 03/09/2023   Component Date Value   • Hemoglobin A1C 03/09/2023 8 3 (A)    Admission on 03/06/2023, Discharged on 03/07/2023   Component Date Value   • Ventricular Rate 03/06/2023 72    • Atrial Rate 03/06/2023 72    • PA Interval 03/06/2023 222    • QRSD Interval 03/06/2023 92    • QT Interval 03/06/2023 378    • QTC Interval 03/06/2023 413    • P Axis 03/06/2023 24    • QRS Axis 03/06/2023 23    • T Wave Axis 03/06/2023 0    • Activated Clotting Time,* 03/06/2023 246 (H)    • Specimen Type 03/06/2023 VENOUS    • Activated Clotting Time,* 03/06/2023 226 (H)    • Specimen Type 03/06/2023 VENOUS    • WBC 03/07/2023 11 00 (H)    • RBC 03/07/2023 4 69    • Hemoglobin 03/07/2023 14 5    • Hematocrit 03/07/2023 42 4    • MCV 03/07/2023 90    • MCH 03/07/2023 30 9    • MCHC 03/07/2023 34 2    • RDW 03/07/2023 11 7    • Platelets 52/11/3025 205    • MPV 03/07/2023 10 6    • Sodium 03/07/2023 136    • Potassium 03/07/2023 4 0    • Chloride 03/07/2023 103    • CO2 03/07/2023 26    • ANION GAP 03/07/2023 7    • BUN 03/07/2023 14    • Creatinine 03/07/2023 0 92    • Glucose 03/07/2023 227 (H)    • Glucose, Fasting 03/07/2023 227 (H)    • Calcium 03/07/2023 9 2    • eGFR 03/07/2023 91      Imaging: No results found      Review of Systems:  Review of Systems   Constitutional: Negative for diaphoresis, fatigue, fever and unexpected weight change  HENT: Negative  Respiratory: Negative for cough, shortness of breath and wheezing  Cardiovascular: Negative for chest pain, palpitations and leg swelling  Gastrointestinal: Negative for abdominal pain, diarrhea and nausea  Musculoskeletal: Negative for gait problem and myalgias  Skin: Negative for rash  Neurological: Negative for dizziness and numbness  Psychiatric/Behavioral: Negative  Physical Exam:  Physical Exam  Constitutional:       Appearance: He is well-developed  HENT:      Head: Normocephalic and atraumatic  Eyes:      Pupils: Pupils are equal, round, and reactive to light  Neck:      Vascular: No JVD  Cardiovascular:      Rate and Rhythm: Regular rhythm  Pulses: Normal pulses  Carotid pulses are 2+ on the right side and 2+ on the left side  Heart sounds: S1 normal and S2 normal    Pulmonary:      Effort: Pulmonary effort is normal       Breath sounds: Normal breath sounds  No wheezing or rales  Abdominal:      General: Bowel sounds are normal       Palpations: Abdomen is soft  Tenderness: There is no abdominal tenderness  Musculoskeletal:         General: No tenderness  Normal range of motion  Cervical back: Normal range of motion and neck supple  Skin:     General: Skin is warm  Neurological:      Mental Status: He is alert and oriented to person, place, and time  Cranial Nerves: No cranial nerve deficit  Deep Tendon Reflexes: Reflexes are normal and symmetric

## 2023-07-18 ENCOUNTER — TELEPHONE (OUTPATIENT)
Dept: ENDOCRINOLOGY | Facility: CLINIC | Age: 58
End: 2023-07-18

## 2023-07-18 DIAGNOSIS — E11.59 TYPE 2 DIABETES MELLITUS WITH OTHER CIRCULATORY COMPLICATION, WITHOUT LONG-TERM CURRENT USE OF INSULIN (HCC): Primary | ICD-10-CM

## 2023-07-18 NOTE — TELEPHONE ENCOUNTER
Patient needs a medication refill for ozempic 1mg sent to [facility] in [place]

## 2023-07-18 NOTE — TELEPHONE ENCOUNTER
Detail Level: Generalized Sent new script for patients 1 mg [facility]. Detail Level: Simple Detail Level: Zone

## 2023-07-19 DIAGNOSIS — E11.21 TYPE 2 DIABETES MELLITUS WITH DIABETIC NEPHROPATHY, WITHOUT LONG-TERM CURRENT USE OF INSULIN (HCC): ICD-10-CM

## 2023-09-06 LAB
CREAT ?TM UR-SCNC: 147.8 UMOL/L
EXT ALBUMIN URINE RANDOM: 6.6
HBA1C MFR BLD HPLC: 6.6 %
MICROALBUMIN/CREAT UR: 44.7 MG/G{CREAT}

## 2023-09-11 ENCOUNTER — OFFICE VISIT (OUTPATIENT)
Dept: ENDOCRINOLOGY | Facility: CLINIC | Age: 58
End: 2023-09-11
Payer: COMMERCIAL

## 2023-09-11 VITALS
HEIGHT: 72 IN | BODY MASS INDEX: 27.26 KG/M2 | RESPIRATION RATE: 16 BRPM | OXYGEN SATURATION: 97 % | SYSTOLIC BLOOD PRESSURE: 100 MMHG | WEIGHT: 201.25 LBS | HEART RATE: 83 BPM | DIASTOLIC BLOOD PRESSURE: 60 MMHG | TEMPERATURE: 97.8 F

## 2023-09-11 DIAGNOSIS — R80.9 MICROALBUMINURIA: ICD-10-CM

## 2023-09-11 DIAGNOSIS — E11.59 TYPE 2 DIABETES MELLITUS WITH OTHER CIRCULATORY COMPLICATION, WITHOUT LONG-TERM CURRENT USE OF INSULIN (HCC): Primary | ICD-10-CM

## 2023-09-11 DIAGNOSIS — E55.9 VITAMIN D DEFICIENCY: ICD-10-CM

## 2023-09-11 DIAGNOSIS — E78.2 MIXED HYPERLIPIDEMIA: ICD-10-CM

## 2023-09-11 DIAGNOSIS — I10 PRIMARY HYPERTENSION: ICD-10-CM

## 2023-09-11 PROCEDURE — 99214 OFFICE O/P EST MOD 30 MIN: CPT | Performed by: STUDENT IN AN ORGANIZED HEALTH CARE EDUCATION/TRAINING PROGRAM

## 2023-09-11 NOTE — PROGRESS NOTES
Established patient Progress Note      Cc: diabetes    Referring Provider  No referring provider defined for this encounter. History of Present Illness:   Loree Neely is a 62 y.o. male with a history of type 2 diabetes without long term use of insulin who presented for follow up. Last seen in the office in June 2023. Reports complications of CAD s/p stent placement. Denies recent illness or hospitalizations. Denies recent severe hypoglycemic or severe hyperglycemic episodes.  Denies any issues with his current regimen. home glucose monitoring: are performed regularly      Current regimen:   Metformin 1000 mg  a day  Jardiance 25 mg once a day  Ozempic 1 mg once weekly    Home blood glucose readings:   Before breakfast: 90 - 110  Before lunch: below 140   Before dinner: 100 - 140   Bedtime: x     Opthamology: UTD  Podiatry: No        Has hypertension: followed by PCP; on Irbesartan 75 mg daily  Has hyperlipidemia: followed by PCP; on Cretor 40 mg  - tolerating well, no myalgias.     Thyroid disorders: No  History of pancreatitis: No,     Patient Active Problem List   Diagnosis   • Coronary artery disease involving native coronary artery of native heart without angina pectoris   • 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, greater than 8 weeks   • Primary hypertension   • Mixed hyperlipidemia   • Status post insertion of drug eluting coronary artery stent   • Type 2 diabetes mellitus with circulatory disorder, without long-term current use of insulin (HCC)   • Microalbuminuria      Past Medical History:   Diagnosis Date   • Cardiac disease    • Diabetes mellitus ([address])    • Hyperlipidemia    • Hypertension    • MI (myocardial infarction) [facility])       Past Surgical History:   Procedure Laterality Date   • CARDIAC CATHETERIZATION N/A 3/6/2023    Procedure: Cardiac catheterization;  Surgeon: Deidra Saha MD;  [facility]: [facility]; Service: Cardiology   • CARDIAC CATHETERIZATION N/A 3/6/2023    Procedure: Cardiac pci;  Surgeon: Deidra Saha MD;  [facility]: [facility]; Service: Cardiology   • CARDIAC CATHETERIZATION N/A 3/6/2023    Procedure: Cardiac Coronary Angiogram;  Surgeon: Deidra Saha MD;  Location: [facility];   Service: Cardiology   • CARDIAC SURGERY     • CORONARY ANGIOPLASTY WITH STENT PLACEMENT     • DEBRIDEMENT TENNIS ELBOW     • HERNIA REPAIR     • KNEE ARTHROSCOPY     • SHOULDER ARTHROSCOPY     • VASECTOMY        Family History   Problem Relation Age of Onset   • Hypertension Maternal Grandfather    • Diabetes type I Maternal Grandmother    • Hypertension Paternal Grandfather    • Diabetes type II Paternal Grandmother      Social History     Tobacco Use   • Smoking status: Former     Packs/day: 0.50     Years: 15.00     Total pack years: 7.50     Types: Cigarettes     Start date: 1977     Quit date: 2005     Years since quittin.7   • Smokeless tobacco: Former     Types: Chew     Quit date: 1985   Substance Use Topics   • Alcohol use: Yes     Comment: socially     Allergies   Allergen Reactions   • Banana - Food Allergy Throat Swelling   • Tetanus Toxoid Other (See Comments)         Current Outpatient Medications:   •  aspirin (ECOTRIN LOW STRENGTH) 81 mg EC tablet, Take 1 tablet by mouth daily, Disp: , Rfl:   •  Cholecalciferol (VITAMIN D) 2000 units CAPS, Take by mouth (Patient not taking: Reported on 3/9/2023), Disp: , Rfl:   •  Empagliflozin (Jardiance) 25 MG TABS, Take 1 tablet (25 mg total) by mouth every morning, Disp: 90 tablet, Rfl: 1  •  irbesartan (AVAPRO) 75 mg tablet, Take 1 tablet (75 mg total) by mouth daily, Disp: 90 tablet, Rfl: 3  •  metFORMIN (GLUCOPHAGE) 1000 MG tablet, Take 1,000 mg by mouth daily with breakfast, Disp: , Rfl:   •  metoprolol succinate (TOPROL-XL) 25 mg 24 hr tablet, Take 1 tablet (25 mg total) by mouth daily, Disp: 90 tablet, Rfl: 3  •  rosuvastatin (CRESTOR) 40 MG tablet, Take 1 tablet (40 mg total) by mouth daily, Disp: 120 tablet, Rfl: 3  •  semaglutide, 1 mg/dose, (Ozempic, 1 MG/DOSE,) 4 mg/3 mL injection pen, Inject 0.75 mL (1 mg total) under the skin every 7 days, Disp: 3 mL, Rfl: 1  •  tadalafil (CIALIS) 10 MG tablet, Take 10 mg by mouth as needed, Disp: , Rfl:   •  ticagrelor (BRILINTA) 90 MG, Take 1 tablet (90 mg total) by mouth 2 (two) times a day, Disp: 180 tablet, Rfl: 3  •  zolpidem (AMBIEN) 10 mg tablet, one tab at bedtime as needed, Disp: , Rfl:   Review of Systems   Constitutional: Negative for appetite change, fatigue and unexpected weight change. HENT: Negative for trouble swallowing and voice change. Eyes: Negative for visual disturbance. Respiratory: Negative for cough, shortness of breath and wheezing. Cardiovascular: Negative for palpitations and leg swelling. Gastrointestinal: Negative for abdominal pain, constipation, diarrhea, nausea and vomiting. Endocrine: Negative for cold intolerance, heat intolerance, polyphagia and polyuria. Musculoskeletal: Negative for arthralgias. Skin: Negative for color change, rash and wound. Neurological: Negative for dizziness, tremors, weakness, light-headedness, numbness and headaches. Psychiatric/Behavioral: Negative for agitation and sleep disturbance. The patient is not nervous/anxious. Physical Exam:  There is no height or weight on file to calculate BMI. There were no vitals taken for this visit.    Wt Readings from Last 3 Encounters:   06/27/23 94.3 kg (208 lb)   06/20/23 94.3 kg (208 lb)   04/10/23 98 kg (216 lb)       GEN: NAD  E/n/m nl facies,   Eyes: no stare or proptosis,   Neck: trachea midline, thyroid NT to palpation, nl in size, no nodules or neck masses noted, no cervical LAD  CV; heart reg rate s1s2 nl, no m/r/g appreciated, no AIDA  Resp: CTAB, good effort  Ab+BS  Neuro: no tremor, 2+ DTRs in BUE  MS: no c/c in digits, moves all 4 ext, nl muscle bulk, gait nl  Skin: warm and dry, no palmar erythema  Ext: no c/c in digits, no edema bilaterally, 2+ DP and PT pulses bilat,   Psych: nl mood and affect, no gross lapses in memory      Labs:   No components found for: "HA1C"  No components found for: "GLU"    Lab Results   Component Value Date    CREATININE 0.92 03/07/2023    CREATININE 0.99 04/02/2021    CREATININE 0.93 10/12/2019    BUN 14 03/07/2023     05/26/2018    K 4.0 03/07/2023     03/07/2023    CO2 26 03/07/2023     eGFR   Date Value Ref Range Status   03/07/2023 91 ml/min/1.73sq m Final     No components found for: "MALBCRER"    Lab Results   Component Value Date    CHOL 141 05/26/2018    HDL 28 (L) 04/02/2021    TRIG 272 (H) 04/02/2021       Lab Results   Component Value Date    ALT 25 04/02/2021    AST 18 04/02/2021    ALKPHOS 63 04/02/2021    BILITOT 0.7 05/26/2018     Component      Latest Ref Rng 9/6/2023   Hemoglobin A1C      <5.7 % 6.6 (H) (E)   eAG, EST AVG Glucose      mg/dL 143 (E)       Ref Range & Units 9/6/23  7:11 AM   Glucose 65 - 99 mg/dL 100 High     BUN 7 - 28 mg/dL 16    Creatinine 0.53 - 1.30 mg/dL 1.01    Sodium 135 - 145 mmol/L 138    Potassium 3.5 - 5.2 mmol/L 3.7    Chloride 100 - 109 mmol/L 101    Carbon Dioxide 21 - 31 mmol/L 27    Calcium 8.5 - 10.1 mg/dL 9.7    Alkaline Phosphatase 35 - 120 U/L 50    Albumin 3.5 - 5.7 g/dL 4.7    Bilirubin, Total 0.2 - 1.0 mg/dL 0.8    Comment: Eltrombopag and its metabolites may interfere with this assay causing erroneously high patient results.    Protein, Total 6.3 - 8.3 g/dL 7.4    AST <41 U/L 19    ALT <56 U/L 14    Anion Gap 3 - 11 10    eGFRcr >59 86    eGFRcr Comment  Interpretive information: calculated GFR    Comment:                                               Cholesterol <200 mg/dL 108    Triglyceride <150 mg/dL 86    Cholesterol, HDL, Direct 23 - 92 mg/dL 30    Cholesterol, Non-HDL <160 mg/dL 78    Cholesterol, LDL, Calculated <130 mg/dL 61    Comment: LDL Cholesterol was calculated using the Friedewald equation. Direct measurement of LDL is not indicated for this patient based on [facility]'s analytical algorithm for measurement of LDL Cholesterol. CHOL/HDL Ratio  3.6       Ref Range & Units 9/6/23  7:11 AM   Thyroid Stimulating Hormone 0.45 - 5.33 uIU/mL 0.88       30 - 100 ng/mL 40    Comment: Interpretive information: Total Vitamin       Ref Range & Units 9/6/23  7:11 AM   Creatinine, Urine 50.0 - 200.0 mg/dL 147.8    Albumin, Urine <3.0 mg/dL 6.6 High     Albumin/Creatinine Ratio, Urine <30.0 mg/gm CREA 44.7 High           No results found for: "TSH", "FREET4", "TSI"    Impression:  1. Type 2 diabetes mellitus with other circulatory complication, without long-term current use of insulin ([address])    2. Primary hypertension    3. Mixed hyperlipidemia    4. Microalbuminuria    5. Vitamin D deficiency           Plan:    Diagnoses and all orders for this visit:    Type 2 diabetes mellitus with other circulatory complication, without long-term current use of insulin ([address]):  Well-controlled with A1c of 6.6%. He has tolerated his current regimen, which will be continued. He was instructed to continue checking his blood sugars once or twice a day at different times and bring his log in his next visit. Return back in 3 months   Lab prior to next visit. -     Hemoglobin A1C; Future  -     Comprehensive metabolic panel; Future  -     Lipid Panel with Direct LDL reflex; Future  -     Albumin / creatinine urine ratio; Future    Primary hypertension:  Controlled, continue Irbesartan 75 mg daily    -     Comprehensive metabolic panel; Future  -     Albumin / creatinine urine ratio; Future    Mixed hyperlipidemia:  Continue Crestor.  -     Lipid Panel with Direct LDL reflex; Future    Microalbuminuria:  Improving. Continue Jardiance and irbesartan. -     Comprehensive metabolic panel;  Future  -     Albumin / creatinine urine ratio; Future    Vitamin D deficiency:  Continue supplementation. Discussed with the patient and all questioned fully answered. He will call me if any problems arise.     Counseled patient on diagnostic results, prognosis, risk and benefit of treatment options, instruction for management, importance of treatment compliance, Risk  factor reduction and impressions      Rachel Lamas MD

## 2023-09-18 DIAGNOSIS — E11.59 TYPE 2 DIABETES MELLITUS WITH OTHER CIRCULATORY COMPLICATION, WITHOUT LONG-TERM CURRENT USE OF INSULIN (HCC): ICD-10-CM

## 2023-09-19 RX ORDER — SEMAGLUTIDE 1.34 MG/ML
INJECTION, SOLUTION SUBCUTANEOUS
Qty: 9 ML | Refills: 0 | Status: SHIPPED | OUTPATIENT
Start: 2023-09-19

## 2023-11-03 ENCOUNTER — TELEPHONE (OUTPATIENT)
Dept: ENDOCRINOLOGY | Facility: CLINIC | Age: 58
End: 2023-11-03

## 2023-12-02 ENCOUNTER — APPOINTMENT (OUTPATIENT)
Dept: LAB | Facility: CLINIC | Age: 58
End: 2023-12-02
Payer: COMMERCIAL

## 2023-12-02 DIAGNOSIS — E11.21 TYPE 2 DIABETES MELLITUS WITH DIABETIC NEPHROPATHY, WITHOUT LONG-TERM CURRENT USE OF INSULIN (HCC): ICD-10-CM

## 2023-12-02 DIAGNOSIS — E55.9 VITAMIN D DEFICIENCY: ICD-10-CM

## 2023-12-02 DIAGNOSIS — I10 PRIMARY HYPERTENSION: ICD-10-CM

## 2023-12-02 DIAGNOSIS — E11.59 TYPE 2 DIABETES MELLITUS WITH OTHER CIRCULATORY COMPLICATION, WITHOUT LONG-TERM CURRENT USE OF INSULIN (HCC): ICD-10-CM

## 2023-12-02 DIAGNOSIS — E78.2 MIXED HYPERLIPIDEMIA: ICD-10-CM

## 2023-12-02 DIAGNOSIS — E78.00 HYPERCHOLESTEROLEMIA: ICD-10-CM

## 2023-12-02 DIAGNOSIS — R80.9 MICROALBUMINURIA: ICD-10-CM

## 2023-12-02 LAB
25(OH)D3 SERPL-MCNC: 27.7 NG/ML (ref 30–100)
ALBUMIN SERPL BCP-MCNC: 4.8 G/DL (ref 3.5–5)
ALP SERPL-CCNC: 61 U/L (ref 34–104)
ALT SERPL W P-5'-P-CCNC: 16 U/L (ref 7–52)
ANION GAP SERPL CALCULATED.3IONS-SCNC: 6 MMOL/L
AST SERPL W P-5'-P-CCNC: 18 U/L (ref 13–39)
BASOPHILS # BLD AUTO: 0.04 THOUSANDS/ÂΜL (ref 0–0.1)
BASOPHILS NFR BLD AUTO: 1 % (ref 0–1)
BILIRUB SERPL-MCNC: 0.52 MG/DL (ref 0.2–1)
BUN SERPL-MCNC: 20 MG/DL (ref 5–25)
CALCIUM SERPL-MCNC: 9.5 MG/DL (ref 8.4–10.2)
CHLORIDE SERPL-SCNC: 102 MMOL/L (ref 96–108)
CHOLEST SERPL-MCNC: 106 MG/DL
CO2 SERPL-SCNC: 32 MMOL/L (ref 21–32)
CREAT SERPL-MCNC: 0.86 MG/DL (ref 0.6–1.3)
CREAT UR-MCNC: 76.2 MG/DL
EOSINOPHIL # BLD AUTO: 0.16 THOUSAND/ÂΜL (ref 0–0.61)
EOSINOPHIL NFR BLD AUTO: 2 % (ref 0–6)
ERYTHROCYTE [DISTWIDTH] IN BLOOD BY AUTOMATED COUNT: 11.9 % (ref 11.6–15.1)
EST. AVERAGE GLUCOSE BLD GHB EST-MCNC: 137 MG/DL
GFR SERPL CREATININE-BSD FRML MDRD: 95 ML/MIN/1.73SQ M
GLUCOSE P FAST SERPL-MCNC: 102 MG/DL (ref 65–99)
HBA1C MFR BLD: 6.4 %
HCT VFR BLD AUTO: 49 % (ref 36.5–49.3)
HDLC SERPL-MCNC: 27 MG/DL
HGB BLD-MCNC: 16.3 G/DL (ref 12–17)
IMM GRANULOCYTES # BLD AUTO: 0.01 THOUSAND/UL (ref 0–0.2)
IMM GRANULOCYTES NFR BLD AUTO: 0 % (ref 0–2)
LDLC SERPL CALC-MCNC: 60 MG/DL (ref 0–100)
LYMPHOCYTES # BLD AUTO: 2.37 THOUSANDS/ÂΜL (ref 0.6–4.47)
LYMPHOCYTES NFR BLD AUTO: 34 % (ref 14–44)
MCH RBC QN AUTO: 32 PG (ref 26.8–34.3)
MCHC RBC AUTO-ENTMCNC: 33.3 G/DL (ref 31.4–37.4)
MCV RBC AUTO: 96 FL (ref 82–98)
MICROALBUMIN UR-MCNC: 40.8 MG/L
MICROALBUMIN/CREAT 24H UR: 54 MG/G CREATININE (ref 0–30)
MONOCYTES # BLD AUTO: 0.52 THOUSAND/ÂΜL (ref 0.17–1.22)
MONOCYTES NFR BLD AUTO: 7 % (ref 4–12)
NEUTROPHILS # BLD AUTO: 3.92 THOUSANDS/ÂΜL (ref 1.85–7.62)
NEUTS SEG NFR BLD AUTO: 56 % (ref 43–75)
NRBC BLD AUTO-RTO: 0 /100 WBCS
PLATELET # BLD AUTO: 208 THOUSANDS/UL (ref 149–390)
PMV BLD AUTO: 11.2 FL (ref 8.9–12.7)
POTASSIUM SERPL-SCNC: 4 MMOL/L (ref 3.5–5.3)
PROT SERPL-MCNC: 7.8 G/DL (ref 6.4–8.4)
RBC # BLD AUTO: 5.1 MILLION/UL (ref 3.88–5.62)
SODIUM SERPL-SCNC: 140 MMOL/L (ref 135–147)
TRIGL SERPL-MCNC: 97 MG/DL
WBC # BLD AUTO: 7.02 THOUSAND/UL (ref 4.31–10.16)

## 2023-12-02 PROCEDURE — 83036 HEMOGLOBIN GLYCOSYLATED A1C: CPT

## 2023-12-02 PROCEDURE — 80061 LIPID PANEL: CPT

## 2023-12-02 PROCEDURE — 36415 COLL VENOUS BLD VENIPUNCTURE: CPT

## 2023-12-02 PROCEDURE — 82570 ASSAY OF URINE CREATININE: CPT

## 2023-12-02 PROCEDURE — 82043 UR ALBUMIN QUANTITATIVE: CPT

## 2023-12-02 PROCEDURE — 82306 VITAMIN D 25 HYDROXY: CPT

## 2023-12-26 ENCOUNTER — OFFICE VISIT (OUTPATIENT)
Dept: ENDOCRINOLOGY | Facility: CLINIC | Age: 58
End: 2023-12-26
Payer: COMMERCIAL

## 2023-12-26 VITALS
OXYGEN SATURATION: 99 % | SYSTOLIC BLOOD PRESSURE: 110 MMHG | RESPIRATION RATE: 18 BRPM | DIASTOLIC BLOOD PRESSURE: 70 MMHG | TEMPERATURE: 97.8 F | BODY MASS INDEX: 28.04 KG/M2 | HEIGHT: 72 IN | WEIGHT: 207 LBS | HEART RATE: 84 BPM

## 2023-12-26 DIAGNOSIS — E78.2 MIXED HYPERLIPIDEMIA: ICD-10-CM

## 2023-12-26 DIAGNOSIS — R80.9 MICROALBUMINURIA: ICD-10-CM

## 2023-12-26 DIAGNOSIS — E55.9 VITAMIN D DEFICIENCY: ICD-10-CM

## 2023-12-26 DIAGNOSIS — E11.59 TYPE 2 DIABETES MELLITUS WITH OTHER CIRCULATORY COMPLICATION, WITHOUT LONG-TERM CURRENT USE OF INSULIN (HCC): Primary | ICD-10-CM

## 2023-12-26 DIAGNOSIS — E11.21 TYPE 2 DIABETES MELLITUS WITH DIABETIC NEPHROPATHY, WITHOUT LONG-TERM CURRENT USE OF INSULIN (HCC): ICD-10-CM

## 2023-12-26 DIAGNOSIS — I10 PRIMARY HYPERTENSION: ICD-10-CM

## 2023-12-26 PROCEDURE — 99214 OFFICE O/P EST MOD 30 MIN: CPT | Performed by: STUDENT IN AN ORGANIZED HEALTH CARE EDUCATION/TRAINING PROGRAM

## 2023-12-26 NOTE — PROGRESS NOTES
Established patient Progress Note      Cc: diabetes    Referring Provider  No referring provider defined for this encounter.     History of Present Illness:   Fidel Calderon is a 58 y.o. male with a history of type 2 diabetes without long term use of insulin who presented for follow up.  Last seen in the office in September 2023.       Reports complications of microabuminuria, CAD s/p stents. Denies recent illness or hospitalizations. Denies recent severe hypoglycemic or severe hyperglycemic episodes. Denies any issues with his current regimen. home glucose monitoring: are performed regularly      Current regimen:     Metformin 1000 mg  once a day  Jardiance 25 mg once a day  Ozempic 1 mg once weekly    Home blood glucose readings:   Before breakfast: 100 - 140   Before lunch: 100 - 130   Before dinner: 100 - 130   Bedtime: x             Opthamology: UTD  Podiatry: No       Has hypertension: followed by PCP; on Irbesartan 75 mg daily  Has hyperlipidemia: followed by PCP; on Cretor 40 mg  - tolerating well, no myalgias.     Thyroid disorders: No  History of pancreatitis: No    Patient Active Problem List   Diagnosis    Coronary artery disease involving native coronary artery of native heart without angina pectoris    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, greater than 8 weeks    Primary hypertension    Mixed hyperlipidemia    Status post insertion of drug eluting coronary artery stent    Type 2 diabetes mellitus with circulatory disorder, without long-term current use of insulin (HCC)    Microalbuminuria      Past Medical History:   Diagnosis Date    Cardiac disease     Diabetes mellitus (HCC)     Hyperlipidemia     Hypertension     MI (myocardial infarction) (HCC)       Past Surgical History:   Procedure Laterality Date    CARDIAC CATHETERIZATION N/A 3/6/2023    Procedure: Cardiac catheterization;   Surgeon: Fernando Panda MD;  Location: [facility];  Service: Cardiology    CARDIAC CATHETERIZATION N/A 3/6/2023    Procedure: Cardiac pci;  Surgeon: Fernando Panda MD;  Location: [facility];  Service: Cardiology    CARDIAC CATHETERIZATION N/A 3/6/2023    Procedure: Cardiac Coronary Angiogram;  Surgeon: Fernando Panda MD;  Location: [facility];  Service: Cardiology    CARDIAC SURGERY      CORONARY ANGIOPLASTY WITH STENT PLACEMENT      DEBRIDEMENT TENNIS ELBOW      HERNIA REPAIR      KNEE ARTHROSCOPY      SHOULDER ARTHROSCOPY      VASECTOMY        Family History   Problem Relation Age of Onset    Hypertension Maternal Grandfather     Diabetes type I Maternal Grandmother     Hypertension Paternal Grandfather     Diabetes type II Paternal Grandmother      Social History     Tobacco Use    Smoking status: Former     Current packs/day: 0.00     Average packs/day: 0.5 packs/day for 28.0 years (14.0 ttl pk-yrs)     Types: Cigarettes     Start date: 1977     Quit date: 2005     Years since quittin.9    Smokeless tobacco: Former     Types: Chew     Quit date: 1985   Substance Use Topics    Alcohol use: Yes     Comment: socially     Allergies   Allergen Reactions    Banana - Food Allergy Throat Swelling    Tetanus Toxoid Other (See Comments)         Current Outpatient Medications:     Ozempic, 1 MG/DOSE, 4 MG/3ML injection pen, INJECT 0.75 ML (1 MG TOTAL) UNDER THE SKIN EVERY 7 DAYS, Disp: 9 mL, Rfl: 0    aspirin (ECOTRIN LOW STRENGTH) 81 mg EC tablet, Take 1 tablet by mouth daily, Disp: , Rfl:     Cholecalciferol (VITAMIN D) 2000 units CAPS, Take by mouth (Patient not taking: Reported on 3/9/2023), Disp: , Rfl:     Empagliflozin (Jardiance) 25 MG TABS, Take 1 tablet (25 mg total) by mouth every morning, Disp: 90 tablet, Rfl: 1    irbesartan (AVAPRO) 75 mg tablet, Take 1 tablet (75 mg total) by mouth daily, Disp: 90 tablet, Rfl: 3    metFORMIN (GLUCOPHAGE) 1000 MG tablet, Take  1,000 mg by mouth daily with breakfast, Disp: , Rfl:     metoprolol succinate (TOPROL-XL) 25 mg 24 hr tablet, Take 1 tablet (25 mg total) by mouth daily, Disp: 90 tablet, Rfl: 3    rosuvastatin (CRESTOR) 40 MG tablet, Take 1 tablet (40 mg total) by mouth daily, Disp: 120 tablet, Rfl: 3    tadalafil (CIALIS) 10 MG tablet, Take 10 mg by mouth as needed, Disp: , Rfl:     ticagrelor (BRILINTA) 90 MG, Take 1 tablet (90 mg total) by mouth 2 (two) times a day, Disp: 180 tablet, Rfl: 3    zolpidem (AMBIEN) 10 mg tablet, one tab at bedtime as needed, Disp: , Rfl:   Review of Systems   Constitutional:  Negative for appetite change, fatigue and unexpected weight change.   HENT:  Negative for trouble swallowing and voice change.    Eyes:  Negative for visual disturbance.   Respiratory:  Negative for cough, shortness of breath and wheezing.    Cardiovascular:  Negative for palpitations and leg swelling.   Gastrointestinal:  Negative for abdominal pain, constipation, diarrhea, nausea and vomiting.   Endocrine: Negative for cold intolerance, heat intolerance, polyphagia and polyuria.   Musculoskeletal:  Negative for arthralgias.   Skin:  Negative for color change, rash and wound.   Neurological:  Negative for dizziness, tremors, weakness, light-headedness, numbness and headaches.   Psychiatric/Behavioral:  Negative for agitation and sleep disturbance. The patient is not nervous/anxious.        Physical Exam:  There is no height or weight on file to calculate BMI.  There were no vitals taken for this visit.   Wt Readings from Last 3 Encounters:   09/11/23 91.3 kg (201 lb 4 oz)   06/27/23 94.3 kg (208 lb)   06/20/23 94.3 kg (208 lb)       GEN: NAD  E/n/m nl facies,   Eyes: no stare or proptosis,   Neck: trachea midline, thyroid NT to palpation, nl in size, no nodules or neck masses noted, no cervical LAD  CV; heart reg rate s1s2 nl, no m/r/g appreciated, no AIDA  Resp: CTAB, good effort  Ab+BS  Neuro: no tremor, 2+ DTRs in BUE  Skin:  "warm and dry, no palmar erythema  Ext: no c/c in digits, no edema bilaterally, 2+ DP and PT pulses bilat,   Psych: nl mood and affect, no gross lapses in memory      Labs:   No components found for: \"HA1C\"  No components found for: \"GLU\"    Lab Results   Component Value Date    CREATININE 0.86 12/02/2023    CREATININE 0.92 03/07/2023    CREATININE 0.99 04/02/2021    BUN 20 12/02/2023     05/26/2018    K 4.0 12/02/2023     12/02/2023    CO2 32 12/02/2023     eGFR   Date Value Ref Range Status   12/02/2023 95 ml/min/1.73sq m Final     No components found for: \"MALBCRER\"    Lab Results   Component Value Date    CHOL 141 05/26/2018    HDL 27 (L) 12/02/2023    TRIG 97 12/02/2023       Lab Results   Component Value Date    ALT 16 12/02/2023    AST 18 12/02/2023    ALKPHOS 61 12/02/2023    BILITOT 0.7 05/26/2018     Component      Latest Ref Rng 12/2/2023   Cholesterol      See Comment mg/dL 106    Triglycerides      See Comment mg/dL 97    HDL      >=40 mg/dL 27 (L)    LDL Calculated      0 - 100 mg/dL 60    EXT Creatinine Urine      Reference range not established. mg/dL 76.2    Albumin,U,Random      <20.0 mg/L 40.8 (H)    Albumin Creat Ratio      0 - 30 mg/g creatinine 54 (H)    Hemoglobin A1C      Normal 4.0-5.6%; PreDiabetic 5.7-6.4%; Diabetic >=6.5%; Glycemic control for adults with diabetes <7.0% % 6.4 (H)    eAG, EST AVG Glucose      mg/dl 137    Vit D, 25-Hydroxy      30.0 - 100.0 ng/mL 27.7 (L)       Legend:  (L) Low  (H) High  No results found for: \"TSH\", \"FREET4\", \"TSI\"    Impression:  1. Type 2 diabetes mellitus with other circulatory complication, without long-term current use of insulin (HCC)    2. Primary hypertension    3. Microalbuminuria    4. Mixed hyperlipidemia    5. Type 2 diabetes mellitus with diabetic nephropathy, without long-term current use of insulin (HCC)    6. Vitamin D deficiency           Plan:    Diagnoses and all orders for this visit:    Type 2 diabetes mellitus with other " circulatory complication, without long-term current use of insulin (HCC):  Well-controlled with A1c of 6.4%, he was encouraged to continue managing his diabetes well.  Will continue current regimen.  Return back in 3 months.  Labs prior to next visit.  -     semaglutide, 1 mg/dose, (Ozempic, 1 MG/DOSE,) 4 mg/3 mL injection pen; 1 mg weekly  -     Hemoglobin A1C; Future  -     Comprehensive metabolic panel; Future    Primary hypertension:  Controlled.  -     Comprehensive metabolic panel; Future  -     Albumin / creatinine urine ratio; Future    Microalbuminuria:  Continue Jardiance and irbesartan    -     Comprehensive metabolic panel; Future  -     Albumin / creatinine urine ratio; Future    Mixed hyperlipidemia:  Continue Crestor    Type 2 diabetes mellitus with diabetic nephropathy, without long-term current use of insulin (HCC)  -     Empagliflozin (Jardiance) 25 MG TABS; Take 1 tablet (25 mg total) by mouth every morning    Vitamin D deficiency:  Vitamin D supplementation 2000 IU daily  -     Vitamin D 25 hydroxy; Future      Discussed with the patient and all questioned fully answered. He will call me if any problems arise.    Counseled patient on diagnostic results, prognosis, risk and benefit of treatment options, instruction for management, importance of treatment compliance, Risk  factor reduction and impressions      Arsenio Holliday MD

## 2024-02-13 ENCOUNTER — TELEMEDICINE (OUTPATIENT)
Dept: CARDIOLOGY CLINIC | Facility: CLINIC | Age: 59
End: 2024-02-13
Payer: COMMERCIAL

## 2024-02-13 DIAGNOSIS — I10 PRIMARY HYPERTENSION: ICD-10-CM

## 2024-02-13 DIAGNOSIS — E78.2 MIXED HYPERLIPIDEMIA: ICD-10-CM

## 2024-02-13 DIAGNOSIS — I25.2 OLD MYOCARDIAL INFARCTION OF INFERIOR WALL, GREATER THAN 8 WEEKS: Primary | ICD-10-CM

## 2024-02-13 DIAGNOSIS — R07.89 OTHER CHEST PAIN: ICD-10-CM

## 2024-02-13 DIAGNOSIS — T73.3XXA FATIGUE DUE TO EXCESSIVE EXERTION, INITIAL ENCOUNTER: ICD-10-CM

## 2024-02-13 DIAGNOSIS — I25.10 CORONARY ARTERY DISEASE INVOLVING NATIVE CORONARY ARTERY OF NATIVE HEART WITHOUT ANGINA PECTORIS: ICD-10-CM

## 2024-02-13 PROCEDURE — 99443 PR PHYS/QHP TELEPHONE EVALUATION 21-30 MIN: CPT | Performed by: INTERNAL MEDICINE

## 2024-02-13 NOTE — PROGRESS NOTES
Virtual Brief Visit    This Visit is being completed by telephone. The Patient is located at Home and in the following state in which I hold an active license [place]    The patient was identified by name and date of birth. Fidel Calderon was informed that this is a telemedicine visit and that the visit is being conducted through Telephone.  My office door was closed. No one else was in the room.  He acknowledged consent and understanding of privacy and security of the video platform. The patient has agreed to participate and understands they can discontinue the visit at any time.    Patient is aware this is a billable service.       Assessment/Plan:    He is running his stationary bike 5 - 10  miles and feels sluggish and fatigued until he gets his HR up. Max HR is only about 120 BPM. He is taking Toprol XL 25 mg daily.  Wt is stable at 207 lbs.    A1C 6.4.    LDL 60    He has LAD stenting in 6/2023. He has diffuse diabetic CAD. EF 55%      1. 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, greater than 8 weeks          2. Coronary artery disease involving native coronary artery of native heart without angina pectoris          3. Primary hypertension          4. Mixed hyperlipidemia             STOP TOPROL AND SEE IF THIS HELPS HIS EXERCISE TOLERANCE.  RTO 3 MONTHS - T/C TRIAL OF RANEXA IF SYMPTOMS PERSIST.  T/C SWITCHING BRILINTA TO XARELTO 2.5 MG BID      Problem List Items Addressed This Visit          Cardiovascular and Mediastinum    Coronary artery disease involving native coronary artery of native heart without angina pectoris    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, greater than 8 weeks - Primary    Primary hypertension       Other    Mixed hyperlipidemia    Fatigue due to excessive exertion    Other chest pain       Recent Visits  No visits were found meeting these conditions.  Showing recent visits within past 7 days and meeting all other requirements  Today's Visits  Date Type Provider Dept   02/13/24  Telemedicine Fernando Panda MD [facility]   Showing today's visits and meeting all other requirements  Future Appointments  No visits were found meeting these conditions.  Showing future appointments within next 150 days and meeting all other requirements         Visit Time  Total Visit Duration: 30 minutes

## 2024-02-29 ENCOUNTER — TELEPHONE (OUTPATIENT)
Dept: CARDIOLOGY CLINIC | Facility: CLINIC | Age: 59
End: 2024-02-29

## 2024-02-29 NOTE — TELEPHONE ENCOUNTER
P/C left a message on nurse vm that continues to be the same angina,burning,reflux.    Called pt back and left a message to call office to discuss.

## 2024-02-29 NOTE — TELEPHONE ENCOUNTER
Fidel called back having mid sternum pain in the center of chest, towards the left side.SOB on exertion,hard time catching breath. Dizziness/lightheaded from time to time. Pt states having frequent heartburn and angina more than normal, and taking tums more than has been in the past. Not being relieved seems like getting worse.      BP at rest 115/70's HR 70's but when the pain comes /95. Pain is very uncomfortable. Dannaing felt like he was going to pass out today.     Advised needs to go to ED for evaluation due to the cardiac S/S. Pt verbally understood.

## 2024-03-02 ENCOUNTER — APPOINTMENT (EMERGENCY)
Dept: RADIOLOGY | Facility: HOSPITAL | Age: 59
End: 2024-03-02
Payer: COMMERCIAL

## 2024-03-02 ENCOUNTER — HOSPITAL ENCOUNTER (EMERGENCY)
Facility: HOSPITAL | Age: 59
Discharge: HOME | End: 2024-03-02
Attending: FAMILY MEDICINE
Payer: COMMERCIAL

## 2024-03-02 ENCOUNTER — TELEPHONE (OUTPATIENT)
Dept: OTHER | Facility: OTHER | Age: 59
End: 2024-03-02

## 2024-03-02 VITALS
WEIGHT: 200 LBS | HEIGHT: 72 IN | DIASTOLIC BLOOD PRESSURE: 96 MMHG | OXYGEN SATURATION: 98 % | HEART RATE: 86 BPM | SYSTOLIC BLOOD PRESSURE: 134 MMHG | TEMPERATURE: 97.9 F | BODY MASS INDEX: 27.09 KG/M2 | RESPIRATION RATE: 16 BRPM

## 2024-03-02 DIAGNOSIS — R07.9 CHEST PAIN: Primary | ICD-10-CM

## 2024-03-02 DIAGNOSIS — E11.69 TYPE 2 DIABETES MELLITUS WITH OTHER SPECIFIED COMPLICATION, WITHOUT LONG-TERM CURRENT USE OF INSULIN (HCC): ICD-10-CM

## 2024-03-02 DIAGNOSIS — I25.10 CORONARY ARTERY DISEASE INVOLVING NATIVE CORONARY ARTERY OF NATIVE HEART WITHOUT ANGINA PECTORIS: ICD-10-CM

## 2024-03-02 LAB
2HR DELTA HS TROPONIN: -3 NG/L
ANION GAP SERPL CALCULATED.3IONS-SCNC: 8 MMOL/L
BASOPHILS # BLD AUTO: 0.07 THOUSANDS/ÂΜL (ref 0–0.1)
BASOPHILS NFR BLD AUTO: 1 % (ref 0–1)
BNP SERPL-MCNC: 36 PG/ML (ref 0–100)
BUN SERPL-MCNC: 24 MG/DL (ref 5–25)
CALCIUM SERPL-MCNC: 10 MG/DL (ref 8.4–10.2)
CARDIAC TROPONIN I PNL SERPL HS: 6 NG/L
CARDIAC TROPONIN I PNL SERPL HS: 9 NG/L
CHLORIDE SERPL-SCNC: 100 MMOL/L (ref 96–108)
CO2 SERPL-SCNC: 30 MMOL/L (ref 21–32)
CREAT SERPL-MCNC: 1.04 MG/DL (ref 0.6–1.3)
EOSINOPHIL # BLD AUTO: 0.31 THOUSAND/ÂΜL (ref 0–0.61)
EOSINOPHIL NFR BLD AUTO: 3 % (ref 0–6)
ERYTHROCYTE [DISTWIDTH] IN BLOOD BY AUTOMATED COUNT: 11.6 % (ref 11.6–15.1)
GFR SERPL CREATININE-BSD FRML MDRD: 78 ML/MIN/1.73SQ M
GLUCOSE SERPL-MCNC: 140 MG/DL (ref 65–140)
HCT VFR BLD AUTO: 50.7 % (ref 36.5–49.3)
HGB BLD-MCNC: 17.1 G/DL (ref 12–17)
IMM GRANULOCYTES # BLD AUTO: 0.02 THOUSAND/UL (ref 0–0.2)
IMM GRANULOCYTES NFR BLD AUTO: 0 % (ref 0–2)
LYMPHOCYTES # BLD AUTO: 2.56 THOUSANDS/ÂΜL (ref 0.6–4.47)
LYMPHOCYTES NFR BLD AUTO: 28 % (ref 14–44)
MCH RBC QN AUTO: 31.8 PG (ref 26.8–34.3)
MCHC RBC AUTO-ENTMCNC: 33.7 G/DL (ref 31.4–37.4)
MCV RBC AUTO: 94 FL (ref 82–98)
MONOCYTES # BLD AUTO: 0.62 THOUSAND/ÂΜL (ref 0.17–1.22)
MONOCYTES NFR BLD AUTO: 7 % (ref 4–12)
NEUTROPHILS # BLD AUTO: 5.56 THOUSANDS/ÂΜL (ref 1.85–7.62)
NEUTS SEG NFR BLD AUTO: 61 % (ref 43–75)
NRBC BLD AUTO-RTO: 0 /100 WBCS
PLATELET # BLD AUTO: 199 THOUSANDS/UL (ref 149–390)
PMV BLD AUTO: 10.2 FL (ref 8.9–12.7)
POTASSIUM SERPL-SCNC: 4.1 MMOL/L (ref 3.5–5.3)
RBC # BLD AUTO: 5.38 MILLION/UL (ref 3.88–5.62)
SODIUM SERPL-SCNC: 138 MMOL/L (ref 135–147)
WBC # BLD AUTO: 9.14 THOUSAND/UL (ref 4.31–10.16)

## 2024-03-02 PROCEDURE — 85025 COMPLETE CBC W/AUTO DIFF WBC: CPT | Performed by: FAMILY MEDICINE

## 2024-03-02 PROCEDURE — 93005 ELECTROCARDIOGRAM TRACING: CPT

## 2024-03-02 PROCEDURE — 36415 COLL VENOUS BLD VENIPUNCTURE: CPT | Performed by: FAMILY MEDICINE

## 2024-03-02 PROCEDURE — 84484 ASSAY OF TROPONIN QUANT: CPT | Performed by: FAMILY MEDICINE

## 2024-03-02 PROCEDURE — 83880 ASSAY OF NATRIURETIC PEPTIDE: CPT | Performed by: FAMILY MEDICINE

## 2024-03-02 PROCEDURE — 99285 EMERGENCY DEPT VISIT HI MDM: CPT

## 2024-03-02 PROCEDURE — 71045 X-RAY EXAM CHEST 1 VIEW: CPT

## 2024-03-02 PROCEDURE — 80048 BASIC METABOLIC PNL TOTAL CA: CPT | Performed by: FAMILY MEDICINE

## 2024-03-02 RX ORDER — METOPROLOL SUCCINATE 25 MG/1
25 TABLET, EXTENDED RELEASE ORAL DAILY
Qty: 30 TABLET | Refills: 0 | Status: SHIPPED | OUTPATIENT
Start: 2024-03-02 | End: 2024-04-01

## 2024-03-02 RX ORDER — SODIUM CHLORIDE 9 MG/ML
3 INJECTION INTRAVENOUS
Status: DISCONTINUED | OUTPATIENT
Start: 2024-03-02 | End: 2024-03-02 | Stop reason: HOSPADM

## 2024-03-02 RX ORDER — AMLODIPINE BESYLATE 2.5 MG/1
2.5 TABLET ORAL DAILY
Qty: 30 TABLET | Refills: 0 | Status: SHIPPED | OUTPATIENT
Start: 2024-03-02 | End: 2024-03-05 | Stop reason: CLARIF

## 2024-03-02 NOTE — ED PROVIDER NOTES
History  Chief Complaint   Patient presents with    Chest Pain     Chest pain, sob. Stents placed last year. States feels the same as before he had placed        Chest Pain  Associated symptoms: no abdominal pain, no back pain, no cough, no dizziness, no fever, no headache, no nausea, no shortness of breath and not vomiting      50-year-old male with history of MI status post LAD stenting on June 2023 diffuse diabetic CAD EF 55% presented with complaint of chest pain ongoing for 3 weeks.  Patient states that he was seen by his cardiology via televisit and told him to stop metoprolol and see if this helps his exercise tolerance.  States that he did stop his Toprol that did not make any difference.  States that he is feeling short of breath and intermittent chest pain with exertion.  Rating his pain at 3 out of 10 at this time patient states he does not want a thing for pain.  He states that he did not take his aspirin.  Patient denies any other complaint.  Otherwise stable awake alert oriented times GCS 15.  Prior to Admission Medications   Prescriptions Last Dose Informant Patient Reported? Taking?   Cholecalciferol (VITAMIN D) 2000 units CAPS  Self Yes No   Sig: Take by mouth   Empagliflozin (Jardiance) 25 MG TABS  Self No No   Sig: Take 1 tablet (25 mg total) by mouth every morning   aspirin (ECOTRIN LOW STRENGTH) 81 mg EC tablet  Self Yes No   Sig: Take 1 tablet by mouth daily   irbesartan (AVAPRO) 75 mg tablet  Self No No   Sig: Take 1 tablet (75 mg total) by mouth daily   metFORMIN (GLUCOPHAGE) 1000 MG tablet  Self Yes No   Sig: Take 1,000 mg by mouth daily with breakfast   metoprolol succinate (TOPROL-XL) 25 mg 24 hr tablet  Self No No   Sig: Take 1 tablet (25 mg total) by mouth daily   metoprolol succinate (TOPROL-XL) 25 mg 24 hr tablet   No Yes   Sig: Take 1 tablet (25 mg total) by mouth daily   rosuvastatin (CRESTOR) 40 MG tablet  Self No No   Sig: Take 1 tablet (40 mg total) by mouth daily   semaglutide, 1  mg/dose, (Ozempic, 1 MG/DOSE,) 4 mg/3 mL injection pen  Self No No   Si mg weekly   tadalafil (CIALIS) 10 MG tablet  Self Yes No   Sig: Take 10 mg by mouth as needed   ticagrelor (BRILINTA) 90 MG  Self No No   Sig: Take 1 tablet (90 mg total) by mouth 2 (two) times a day   zolpidem (AMBIEN) 10 mg tablet  Self Yes No   Sig: one tab at bedtime as needed      Facility-Administered Medications: None       Past Medical History:   Diagnosis Date    Cardiac disease     Diabetes mellitus (HCC)     Hyperlipidemia     Hypertension     MI (myocardial infarction) (HCC)        Past Surgical History:   Procedure Laterality Date    CARDIAC CATHETERIZATION N/A 3/6/2023    Procedure: Cardiac catheterization;  Surgeon: Fernando Panda MD;  Location: [facility];  Service: Cardiology    CARDIAC CATHETERIZATION N/A 3/6/2023    Procedure: Cardiac pci;  Surgeon: Fernando Panda MD;  Location: [facility];  Service: Cardiology    CARDIAC CATHETERIZATION N/A 3/6/2023    Procedure: Cardiac Coronary Angiogram;  Surgeon: Fernando Panda MD;  Location: [facility];  Service: Cardiology    CARDIAC SURGERY      CORONARY ANGIOPLASTY WITH STENT PLACEMENT      DEBRIDEMENT TENNIS ELBOW      HERNIA REPAIR      KNEE ARTHROSCOPY      SHOULDER ARTHROSCOPY      VASECTOMY         Family History   Problem Relation Age of Onset    Hypertension Maternal Grandfather     Diabetes type I Maternal Grandmother     Hypertension Paternal Grandfather     Diabetes type II Paternal Grandmother      I have reviewed and agree with the history as documented.    E-Cigarette/Vaping    E-Cigarette Use Never User      E-Cigarette/Vaping Substances    Nicotine No     THC No     CBD No     Flavoring No     Other No     Unknown No      Social History     Tobacco Use    Smoking status: Former     Current packs/day: 0.00     Average packs/day: 0.5 packs/day for 28.0 years (14.0 ttl pk-yrs)     Types: Cigarettes     Start date: 1977     Quit  date: 2005     Years since quittin.1    Smokeless tobacco: Former     Types: Chew     Quit date: 1985   Vaping Use    Vaping status: Never Used   Substance Use Topics    Alcohol use: Yes     Comment: socially    Drug use: No       Review of Systems   Constitutional:  Negative for chills and fever.   HENT:  Negative for rhinorrhea and sore throat.    Eyes:  Negative for visual disturbance.   Respiratory:  Negative for cough and shortness of breath.    Cardiovascular:  Positive for chest pain. Negative for leg swelling.   Gastrointestinal:  Negative for abdominal pain, diarrhea, nausea and vomiting.   Genitourinary:  Negative for dysuria.   Musculoskeletal:  Negative for back pain and myalgias.   Skin:  Negative for rash.   Neurological:  Negative for dizziness and headaches.   Psychiatric/Behavioral:  Negative for confusion.    All other systems reviewed and are negative.      Physical Exam  Physical Exam  Vitals and nursing note reviewed.   Constitutional:       General: He is not in acute distress.     Appearance: He is well-developed. He is not diaphoretic.   HENT:      Head: Normocephalic and atraumatic.      Right Ear: External ear normal.      Left Ear: External ear normal.      Nose: Nose normal.   Eyes:      Conjunctiva/sclera: Conjunctivae normal.      Pupils: Pupils are equal, round, and reactive to light.   Cardiovascular:      Rate and Rhythm: Normal rate and regular rhythm.   Pulmonary:      Effort: Pulmonary effort is normal. No respiratory distress.      Breath sounds: Normal breath sounds. No wheezing.   Abdominal:      General: Bowel sounds are normal. There is no distension.      Palpations: Abdomen is soft.      Tenderness: There is no abdominal tenderness.   Musculoskeletal:      Cervical back: Normal range of motion and neck supple.   Lymphadenopathy:      Cervical: No cervical adenopathy.   Skin:     General: Skin is warm and dry.      Capillary Refill: Capillary refill takes less  than 2 seconds.   Neurological:      Mental Status: He is alert and oriented to person, place, and time.   Psychiatric:         Behavior: Behavior normal.         Vital Signs  ED Triage Vitals   Temperature Pulse Respirations Blood Pressure SpO2   03/02/24 0809 03/02/24 0810 03/02/24 0810 03/02/24 0809 03/02/24 0810   97.9 °F (36.6 °C) 90 20 143/84 97 %      Temp Source Heart Rate Source Patient Position - Orthostatic VS BP Location FiO2 (%)   03/02/24 0809 03/02/24 0810 03/02/24 0810 03/02/24 0809 --   Tympanic Monitor Sitting Left arm       Pain Score       03/02/24 0809       3           Vitals:    03/02/24 0845 03/02/24 0915 03/02/24 0930 03/02/24 1000   BP: 122/74 133/76 132/78 159/90   Pulse: 78 82 78 83   Patient Position - Orthostatic VS: Sitting Sitting Sitting Sitting         Visual Acuity      ED Medications  Medications   sodium chloride (PF) 0.9 % injection 3 mL (has no administration in time range)       Diagnostic Studies  Results Reviewed       Procedure Component Value Units Date/Time    HS Troponin I 2hr [[id number]]  (Normal) Collected: 03/02/24 1008    Lab Status: Final result Specimen: Blood from Arm, Right Updated: 03/02/24 1033     hs TnI 2hr 6 ng/L      Delta 2hr hsTnI -3 ng/L     HS Troponin I 4hr [[id number]]     Lab Status: No result Specimen: Blood     HS Troponin 0hr (reflex protocol) [[id number]]  (Normal) Collected: 03/02/24 0818    Lab Status: Final result Specimen: Blood from Arm, Right Updated: 03/02/24 0851     hs TnI 0hr 9 ng/L     B-Type Natriuretic Peptide(BNP) [[id number]]  (Normal) Collected: 03/02/24 0818    Lab Status: Final result Specimen: Blood from Arm, Right Updated: 03/02/24 0850     BNP 36 pg/mL     Basic metabolic panel [[id number]] Collected: 03/02/24 0818    Lab Status: Final result Specimen: Blood from Arm, Right Updated: 03/02/24 0850     Sodium 138 mmol/L      Potassium 4.1 mmol/L      Chloride 100 mmol/L      CO2 30 mmol/L      ANION GAP 8 mmol/L      BUN 24 mg/dL       Creatinine 1.04 mg/dL      Glucose 140 mg/dL      Calcium 10.0 mg/dL      eGFR 78 ml/min/1.73sq m     Narrative:      National Kid[employer] guidelines for Chronic Kidney Disease (CKD):     Stage 1 with normal or high GFR (GFR > 90 mL/min/1.73 square meters)    Stage 2 Mild CKD (GFR = 60-89 mL/min/1.73 square meters)    Stage 3A Moderate CKD (GFR = 45-59 mL/min/1.73 square meters)    Stage 3B Moderate CKD (GFR = 30-44 mL/min/1.73 square meters)    Stage 4 Severe CKD (GFR = 15-29 mL/min/1.73 square meters)    Stage 5 End Stage CKD (GFR <15 mL/min/1.73 square meters)  Note: GFR calculation is accurate only with a steady state creatinine    CBC and differential [[id number]]  (Abnormal) Collected: 03/02/24 0818    Lab Status: Final result Specimen: Blood from Arm, Right Updated: 03/02/24 0825     WBC 9.14 Thousand/uL      RBC 5.38 Million/uL      Hemoglobin 17.1 g/dL      Hematocrit 50.7 %      MCV 94 fL      MCH 31.8 pg      MCHC 33.7 g/dL      RDW 11.6 %      MPV 10.2 fL      Platelets 199 Thousands/uL      nRBC 0 /100 WBCs      Neutrophils Relative 61 %      Immat GRANS % 0 %      Lymphocytes Relative 28 %      Monocytes Relative 7 %      Eosinophils Relative 3 %      Basophils Relative 1 %      Neutrophils Absolute 5.56 Thousands/µL      Immature Grans Absolute 0.02 Thousand/uL      Lymphocytes Absolute 2.56 Thousands/µL      Monocytes Absolute 0.62 Thousand/µL      Eosinophils Absolute 0.31 Thousand/µL      Basophils Absolute 0.07 Thousands/µL                    X-ray chest 1 view portable   Final Result by Prachi Solitario MD (03/02 1024)      No acute cardiopulmonary disease.            Workstation performed: [id number]                    Procedures  Procedures         ED Course  ED Course as of 03/02/24 1208   Sat Mar 02, 2024   0947 hs TnI 0hr: 9  Case discussed with Dr. Tran on-call states that the if second troponin is within normal limits and no EKG changes recommending to add a calcium  channel blocker like Norvasc 2.5 mg p.o. daily and restart his metoprolol.  Also recommending that the patient should reach out to his cardiologist and no I will also send him a message.             HEART Risk Score      Flowsheet Row Most Recent Value   Heart Score Risk Calculator    History 1 Filed at: 03/02/2024 1034   ECG 0 Filed at: 03/02/2024 1034   Age 1 Filed at: 03/02/2024 1034   Risk Factors 2 Filed at: 03/02/2024 1034   Troponin 0 Filed at: 03/02/2024 1034   HEART Score 4 Filed at: 03/02/2024 1034                          SBIRT 20yo+      Flowsheet Row Most Recent Value   Initial Alcohol Screen: US AUDIT-C     1. How often do you have a drink containing alcohol? 2 Filed at: 03/02/2024 1049   2. How many drinks containing alcohol do you have on a typical day you are drinking?  0 Filed at: 03/02/2024 1049   3a. Male UNDER 65: How often do you have five or more drinks on one occasion? 0 Filed at: 03/02/2024 1049   Audit-C Score 2 Filed at: 03/02/2024 1049   YUMIKO: How many times in the past year have you...    Used an illegal drug or used a prescription medication for non-medical reasons? Never Filed at: 03/02/2024 1049                      Medical Decision Making  50-year-old male with history of MI status post LAD stenting on June 2023 diffuse diabetic CAD EF 55% presented with complaint of chest pain ongoing for 3 weeks.  Patient states that he was seen by his cardiology via televisit and told him to stop metoprolol and see if this helps his exercise tolerance.  States that he did stop his Toprol that did not make any difference.  States that he is feeling short of breath and intermittent chest pain with exertion.  Rating his pain at 3 out of 10 at this time patient states he does not want a thing for pain.  He states that he did not take his aspirin.  Patient denies any other complaint.  Otherwise stable awake alert oriented times GCS 15.  History is taken from patient and wife was by  bedside.  Differential diagnoses include MI/CAD/heart failure/pericarditis/pleural effusion/pneumonia  Plan will obtain labs CBC BMP troponin BNP chest x-ray  Reviewed initial troponin 10 6 Case discussed with Dr. Tran and states that if her troponin remains within normal limits and no EKG changes and patient is chest pain free patient can be discharged and follow-up outpatient recommending to start patient on Norvasc 2.5 and restart his metoprolol 25 mg tablet.  2-hour troponin within normal limits to slight T wave inversion this was discussed with cardiology again recommending to discuss with patient recommending admission if the patient clinically does not look comfortable.  Patient was offered and recommended admission multiple times significant other at bedside patient refused states that he will go home and to call his cardiology team and follow-up with them on Monday.  Multiple discussion with patient and wife recommending admission however patient is requesting to be discharged home.  Disposition discharge home with this for precaution to return to the ED if notice any worsening pain shortness of breath immediate return back to the ED.  I also recommended to decrease his activity until he see his cardiology   Lab reviewed  Chest x-ray reviewed    Amount and/or Complexity of Data Reviewed  Labs: ordered. Decision-making details documented in ED Course.  Radiology: ordered.    Risk  Prescription drug management.             Disposition  Final diagnoses:   Chest pain     Time reflects when diagnosis was documented in both MDM as applicable and the Disposition within this note       Time User Action Codes Description Comment    3/2/2024  8:44 AM Santiago Cesar Add [R07.9] Chest pain     3/2/2024 11:28 AM Santiago Cesar Add [E11.69] Type 2 diabetes mellitus with other specified complication, without long-term current use of insulin ([facility])     3/2/2024 11:28 AM Santiago Cesar Add [I25.10] Coronary artery disease  involving native coronary artery of native heart without angina pectoris           ED Disposition       ED Disposition   Discharge    Condition   Stable    Date/Time   Sat Mar 2, 2024 1128    Comment   Dc home              Follow-up Information       Follow up With Specialties Details Why Contact Info    Jani Fernandez MD Emergency Medicine, Internal Medicine Schedule an appointment as soon as possible for a visit in 2 days If symptoms worsen [address]  [place] [address]  [phone]      Manny Jimenez MD Cardiology Schedule an appointment as soon as possible for a visit in 2 days If symptoms worsen [address]  [address]  [place] [address]  [phone]              Patient's Medications   Discharge Prescriptions    AMLODIPINE (NORVASC) 2.5 MG TABLET    Take 1 tablet (2.5 mg total) by mouth daily       Start Date: 3/2/2024  End Date: 4/1/2024       Order Dose: 2.5 mg       Quantity: 30 tablet    Refills: 0       No discharge procedures on file.    PDMP Review       None            ED Provider  Electronically Signed by             Santiago Cesar MD  03/02/24 6115

## 2024-03-02 NOTE — Clinical Note
Case was discussed with  and the patient's admission status was agreed to be Admission Status: observation status to the service of Dr. Jean Baptiste

## 2024-03-02 NOTE — Clinical Note
Fidel Calderon was seen and treated in our emergency department on 3/2/2024.                Diagnosis:     Fidel CALVIN  may return to work on return date.    He may return on this date: 03/05/2024         If you have any questions or concerns, please don't hesitate to call.      Santiago Cesar MD    ______________________________           _______________          _______________  Hospital Representative                              Date                                Time

## 2024-03-02 NOTE — TELEPHONE ENCOUNTER
Pt is calling to schedule an a appointment with cardiology he was seen in the ER 03/02/2024 and was told told to f/u . Thank you .

## 2024-03-03 LAB
ATRIAL RATE: 83 BPM
ATRIAL RATE: 88 BPM
ATRIAL RATE: 95 BPM
P AXIS: 48 DEGREES
P AXIS: 53 DEGREES
P AXIS: 57 DEGREES
PR INTERVAL: 206 MS
PR INTERVAL: 210 MS
PR INTERVAL: 218 MS
QRS AXIS: 83 DEGREES
QRS AXIS: 84 DEGREES
QRS AXIS: 88 DEGREES
QRSD INTERVAL: 100 MS
QRSD INTERVAL: 102 MS
QRSD INTERVAL: 102 MS
QT INTERVAL: 362 MS
QT INTERVAL: 378 MS
QT INTERVAL: 378 MS
QTC INTERVAL: 444 MS
QTC INTERVAL: 454 MS
QTC INTERVAL: 457 MS
T WAVE AXIS: 16 DEGREES
T WAVE AXIS: 27 DEGREES
T WAVE AXIS: 7 DEGREES
VENTRICULAR RATE: 83 BPM
VENTRICULAR RATE: 88 BPM
VENTRICULAR RATE: 95 BPM

## 2024-03-03 PROCEDURE — 93010 ELECTROCARDIOGRAM REPORT: CPT | Performed by: INTERNAL MEDICINE

## 2024-03-04 NOTE — TELEPHONE ENCOUNTER
Pt was in the ED on Saturday. Please keep an eye out for a cancellation with either Pam sometime this week, or Dr Panda on Thursday.   He really needs to be seen soon.   Pt works in [place] so [employer] works best for him.  Thanks

## 2024-03-05 ENCOUNTER — OFFICE VISIT (OUTPATIENT)
Dept: CARDIOLOGY CLINIC | Facility: CLINIC | Age: 59
End: 2024-03-05
Payer: COMMERCIAL

## 2024-03-05 VITALS
HEART RATE: 88 BPM | HEIGHT: 72 IN | WEIGHT: 206.9 LBS | OXYGEN SATURATION: 99 % | BODY MASS INDEX: 28.02 KG/M2 | SYSTOLIC BLOOD PRESSURE: 106 MMHG | DIASTOLIC BLOOD PRESSURE: 62 MMHG

## 2024-03-05 DIAGNOSIS — R07.9 CHEST PAIN, UNSPECIFIED TYPE: Primary | ICD-10-CM

## 2024-03-05 DIAGNOSIS — E11.69 TYPE 2 DIABETES MELLITUS WITH OTHER SPECIFIED COMPLICATION, WITHOUT LONG-TERM CURRENT USE OF INSULIN (HCC): ICD-10-CM

## 2024-03-05 DIAGNOSIS — Z95.5 S/P DRUG ELUTING CORONARY STENT PLACEMENT: ICD-10-CM

## 2024-03-05 DIAGNOSIS — E78.2 MIXED HYPERLIPIDEMIA: ICD-10-CM

## 2024-03-05 DIAGNOSIS — I10 HYPERTENSION, UNSPECIFIED TYPE: ICD-10-CM

## 2024-03-05 LAB
CRP SERPL-MCNC: 1.2 MG/L
ERYTHROCYTE [SEDIMENTATION RATE] IN BLOOD BY WESTERGREN METHOD: 11 MM/HR (ref 0–20)

## 2024-03-05 PROCEDURE — 99215 OFFICE O/P EST HI 40 MIN: CPT | Performed by: NURSE PRACTITIONER

## 2024-03-05 RX ORDER — AMLODIPINE BESYLATE 5 MG/1
5 TABLET ORAL DAILY
Qty: 30 TABLET | Refills: 3 | Status: SHIPPED | OUTPATIENT
Start: 2024-03-05 | End: 2024-03-06 | Stop reason: SDUPTHER

## 2024-03-05 RX ORDER — NITROGLYCERIN 0.4 MG/1
0.4 TABLET SUBLINGUAL
Qty: 30 TABLET | Refills: 3 | Status: SHIPPED | OUTPATIENT
Start: 2024-03-05 | End: 2024-03-06 | Stop reason: SDUPTHER

## 2024-03-05 NOTE — H&P (VIEW-ONLY)
Cardiology Follow Up    Fidel Calderon  1965  [id number]  [facility] [place]  [address]  [place] [address] [phone]    1. Chest pain, unspecified type  Sedimentation rate, automated    C-reactive protein    nitroglycerin (NITROSTAT) 0.4 mg SL tablet      2. S/P drug eluting coronary stent placement        3. Hypertension, unspecified type  amLODIPine (NORVASC) 5 mg tablet      4. Mixed hyperlipidemia        5. Type 2 diabetes mellitus with other specified complication, without long-term current use of insulin ([facility])            Interval History:   Mr Fidel Calderon presented to [facility]'s ED on 3/02/24 with chest pain.  I am presented with a 3-week history of chest pain.  Metoprolol was stopped by Cardiology to evaluate if this would improve his exercise tolerance.  Stopping Metoprolol did not improve his symptoms.  Metoprolol restarted.  Amlodipine 2.5mg daily started.  He was instructed to follow up with Cardiology.     Fidel presents to our office for a recent ED follow up visit.  He is experiencing severe CP with vigorous exertion.  His SBP elevating into the 200's after a short time with vigorous exercise.  Fidel admits to left sided persistent chest discomfort sitting in the office today, 1-2/10 relieved with sitting forward.  Brain denies chest discomfort with walking on a flat surface.      Medical History   Primary Cardiologist Dr Panda  IWMI  CAD  Hx of PCI with LACEY to 80% stenosis of mid LAD 3/06/2023  Hypertension  Hyperlipidemia 12/02/23 , TG 97, HDL 27, LDL 60   DM2 HgbA1C 6.4 on 12/02/23     Patient Active Problem List   Diagnosis    Coronary artery disease involving native coronary artery of native heart without angina pectoris    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, greater than 8 weeks    Primary hypertension    Mixed hyperlipidemia    Status post insertion of drug eluting coronary artery  stent    Type 2 diabetes mellitus with circulatory disorder, without long-term current use of insulin (HCC)    Microalbuminuria    Fatigue due to excessive exertion    Other chest pain     Past Medical History:   Diagnosis Date    Cardiac disease     Diabetes mellitus (HCC)     Hyperlipidemia     Hypertension     MI (myocardial infarction) (HCC)      Social History     Socioeconomic History    Marital status: /Civil Union     Spouse name: Not on file    Number of children: Not on file    Years of education: Not on file    Highest education level: Not on file   Occupational History    Not on file   Tobacco Use    Smoking status: Former     Current packs/day: 0.00     Average packs/day: 0.5 packs/day for 28.0 years (14.0 ttl pk-yrs)     Types: Cigarettes     Start date: 1977     Quit date: 2005     Years since quittin.1    Smokeless tobacco: Former     Types: Chew     Quit date: 1985   Vaping Use    Vaping status: Never Used   Substance and Sexual Activity    Alcohol use: Yes     Comment: socially    Drug use: No    Sexual activity: Yes     Birth control/protection: Post-menopausal   Other Topics Concern    Not on file   Social History Narrative    Not on file     Social Determinants of Health     Financial Resource Strain: Not on file   Food Insecurity: Not on file   Transportation Needs: Not on file   Physical Activity: Not on file   Stress: Not on file   Social Connections: Not on file   Intimate Partner Violence: Not on file   Housing Stability: Not on file      Family History   Problem Relation Age of Onset    Hypertension Maternal Grandfather     Diabetes type I Maternal Grandmother     Hypertension Paternal Grandfather     Diabetes type II Paternal Grandmother      Past Surgical History:   Procedure Laterality Date    CARDIAC CATHETERIZATION N/A 3/6/2023    Procedure: Cardiac catheterization;  Surgeon: Fernando Panda MD;  [facility]: [facility];  Service: Cardiology    CARDIAC  CATHETERIZATION N/A 3/6/2023    Procedure: Cardiac pci;  Surgeon: Fernando Panda MD;  [facility]: [facility];  Service: Cardiology    CARDIAC CATHETERIZATION N/A 3/6/2023    Procedure: Cardiac Coronary Angiogram;  Surgeon: Fernando Panda MD;  Location: [facility];  Service: Cardiology    CARDIAC SURGERY      CORONARY ANGIOPLASTY WITH STENT PLACEMENT      DEBRIDEMENT TENNIS ELBOW      HERNIA REPAIR      KNEE ARTHROSCOPY      SHOULDER ARTHROSCOPY      VASECTOMY         Current Outpatient Medications:     amLODIPine (NORVASC) 2.5 mg tablet, Take 1 tablet (2.5 mg total) by mouth daily, Disp: 30 tablet, Rfl: 0    aspirin (ECOTRIN LOW STRENGTH) 81 mg EC tablet, Take 1 tablet by mouth daily, Disp: , Rfl:     Cholecalciferol (VITAMIN D) 2000 units CAPS, Take by mouth, Disp: , Rfl:     Empagliflozin (Jardiance) 25 MG TABS, Take 1 tablet (25 mg total) by mouth every morning, Disp: 90 tablet, Rfl: 1    irbesartan (AVAPRO) 75 mg tablet, Take 1 tablet (75 mg total) by mouth daily, Disp: 90 tablet, Rfl: 3    metFORMIN (GLUCOPHAGE) 1000 MG tablet, Take 1,000 mg by mouth daily with breakfast, Disp: , Rfl:     metoprolol succinate (TOPROL-XL) 25 mg 24 hr tablet, Take 1 tablet (25 mg total) by mouth daily, Disp: 30 tablet, Rfl: 0    rosuvastatin (CRESTOR) 40 MG tablet, Take 1 tablet (40 mg total) by mouth daily, Disp: 120 tablet, Rfl: 3    semaglutide, 1 mg/dose, (Ozempic, 1 MG/DOSE,) 4 mg/3 mL injection pen, 1 mg weekly, Disp: 9 mL, Rfl: 0    tadalafil (CIALIS) 10 MG tablet, Take 10 mg by mouth as needed, Disp: , Rfl:     ticagrelor (BRILINTA) 90 MG, Take 1 tablet (90 mg total) by mouth 2 (two) times a day, Disp: 180 tablet, Rfl: 3    zolpidem (AMBIEN) 10 mg tablet, one tab at bedtime as needed, Disp: , Rfl:   Allergies   Allergen Reactions    Banana - Food Allergy Throat Swelling    Tetanus Toxoid Other (See Comments)       Labs:  Admission on 03/02/2024, Discharged on 03/02/2024   Component Date Value     Ventricular Rate 03/02/2024 95     Atrial Rate 03/02/2024 95     OH Interval 03/02/2024 206     QRSD Interval 03/02/2024 102     QT Interval 03/02/2024 362     QTC Interval 03/02/2024 454     P Axis 03/02/2024 48     QRS Axis 03/02/2024 88     T Wave Smiley 03/02/2024 16     WBC 03/02/2024 9.14     RBC 03/02/2024 5.38     Hemoglobin 03/02/2024 17.1 (H)     Hematocrit 03/02/2024 50.7 (H)     MCV 03/02/2024 94     MCH 03/02/2024 31.8     MCHC 03/02/2024 33.7     RDW 03/02/2024 11.6     MPV 03/02/2024 10.2     Platelets 03/02/2024 199     nRBC 03/02/2024 0     Neutrophils Relative 03/02/2024 61     Immat GRANS % 03/02/2024 0     Lymphocytes Relative 03/02/2024 28     Monocytes Relative 03/02/2024 7     Eosinophils Relative 03/02/2024 3     Basophils Relative 03/02/2024 1     Neutrophils Absolute 03/02/2024 5.56     Immature Grans Absolute 03/02/2024 0.02     Lymphocytes Absolute 03/02/2024 2.56     Monocytes Absolute 03/02/2024 0.62     Eosinophils Absolute 03/02/2024 0.31     Basophils Absolute 03/02/2024 0.07     Sodium 03/02/2024 138     Potassium 03/02/2024 4.1     Chloride 03/02/2024 100     CO2 03/02/2024 30     ANION GAP 03/02/2024 8     BUN 03/02/2024 24     Creatinine 03/02/2024 1.04     Glucose 03/02/2024 140     Calcium 03/02/2024 10.0     eGFR 03/02/2024 78     hs TnI 0hr 03/02/2024 9     BNP 03/02/2024 36     Ventricular Rate 03/02/2024 83     Atrial Rate 03/02/2024 83     OH Interval 03/02/2024 210     QRSD Interval 03/02/2024 102     QT Interval 03/02/2024 378     QTC Interval 03/02/2024 444     P Axis 03/02/2024 53     QRS Axis 03/02/2024 84     T Wave Smiley 03/02/2024 27     hs TnI 2hr 03/02/2024 6     Delta 2hr hsTnI 03/02/2024 -3     Ventricular Rate 03/02/2024 88     Atrial Rate 03/02/2024 88     OH Interval 03/02/2024 218     QRSD Interval 03/02/2024 100     QT Interval 03/02/2024 378     QTC Interval 03/02/2024 457     P Axis 03/02/2024 57     QRS Axis 03/02/2024 83     T Wave Smiley 03/02/2024 7       Imaging: X-ray chest 1 view portable    Result Date: 3/2/2024  Narrative: XR CHEST PORTABLE INDICATION: chest pain. COMPARISON: CXR 6/23/2015. FINDINGS: Clear lungs. No pneumothorax or pleural effusion. Normal heart size. Cardiac stent. Bones are unremarkable for age. Normal upper abdomen.     Impression: No acute cardiopulmonary disease. Workstation performed: [id number]       Review of Systems:  Review of Systems   Respiratory:  Positive for chest tightness.    Cardiovascular:  Positive for chest pain.   All other systems reviewed and are negative.      Physical Exam:  Physical Exam  Vitals reviewed.   Constitutional:       Appearance: Normal appearance.   Cardiovascular:      Rate and Rhythm: Normal rate and regular rhythm.      Pulses: Normal pulses.      Heart sounds: Normal heart sounds.   Pulmonary:      Effort: Pulmonary effort is normal.      Breath sounds: Normal breath sounds.   Musculoskeletal:         General: Normal range of motion.      Cervical back: Normal range of motion and neck supple.   Skin:     General: Skin is warm and dry.      Capillary Refill: Capillary refill takes less than 2 seconds.   Neurological:      General: No focal deficit present.      Mental Status: He is alert and oriented to person, place, and time.   Psychiatric:         Mood and Affect: Mood normal.         Behavior: Behavior normal.         Discussion/Summary:  # Chest pain 1-2/10 with sitting, relieved with bending forward. Possible pericarditis. He denies recent illness.  I have ordered CRP and ESR to be done tomorrow.  If WNL consider further cardiac evaluation possible LHC.  I will discuss with Dr Panda at that time.  He was instructed not to overexert okay to walk on flat surfaces daily  # Hx of PCI with LACEY to 80% stenosis of mid LAD 3/06/2023 continue on Brilinta 90 mg twice daily, aspirin 81 mg daily, metoprolol succinate 25 mg daily, losartan 75 mg daily, amlodipine increased to 5 mg daily, heart healthy  diet  # Hypertension RUE sitting 120/60 increase Amlodipine from 2.5mg daily to 5mg daily, continue on night 25 mg daily, empiric Victorino 75 mg daily DASH diet  # Hyperlipidemia 12/02/23 , TG 97, HDL 27, LDL 60 continue on Crestor 40 mg daily, heart healthy diet  # DM2 HgbA1C 6.4 on 12/02/23 continue on current medical regimen

## 2024-03-06 ENCOUNTER — TELEPHONE (OUTPATIENT)
Dept: CARDIOLOGY CLINIC | Facility: CLINIC | Age: 59
End: 2024-03-06

## 2024-03-06 DIAGNOSIS — R07.9 CHEST PAIN, UNSPECIFIED TYPE: ICD-10-CM

## 2024-03-06 DIAGNOSIS — I10 HYPERTENSION, UNSPECIFIED TYPE: ICD-10-CM

## 2024-03-06 RX ORDER — NITROGLYCERIN 0.4 MG/1
0.4 TABLET SUBLINGUAL
Qty: 90 TABLET | Refills: 3 | Status: SHIPPED | OUTPATIENT
Start: 2024-03-06

## 2024-03-06 RX ORDER — AMLODIPINE BESYLATE 5 MG/1
5 TABLET ORAL DAILY
Qty: 90 TABLET | Refills: 3 | Status: SHIPPED | OUTPATIENT
Start: 2024-03-06

## 2024-03-06 NOTE — TELEPHONE ENCOUNTER
----- Message from NRAGIS Vázquez sent at 3/6/2024  2:40 PM EST -----  Please call Fidel and inform him lab studies WNL.  I will see Dr Panda tomorrow to discuss a plan.  I will be in touch with Fidel tomorrow.  Thank you Pam     Spoke with pt re: Labs, wnl. Pam will be in touch tomorrow.

## 2024-03-07 ENCOUNTER — TELEPHONE (OUTPATIENT)
Dept: CARDIOLOGY CLINIC | Facility: CLINIC | Age: 59
End: 2024-03-07

## 2024-03-07 NOTE — ADDENDUM NOTE
Addended by: EBONI MARCUM on: 3/7/2024 12:53 PM     Modules accepted: Orders     D/c per wheelchair with oxygen at 3 liters per nasal cannula in stable condition.

## 2024-03-07 NOTE — TELEPHONE ENCOUNTER
Fidel Calderon. I'm a patient of Doctor Shanell's YOB: 1965. They just scheduled me for a cardio calf on the 18th. My first question work needs to know how many days I'm gonna be out minimum. And also if I have this done on Monday, when am I allowed to get on an airplane and fly? How long my return phone call number is my cell [phone]? Thank you. Have a good day.

## 2024-03-08 NOTE — TELEPHONE ENCOUNTER
Called pt to follow up and advised still did not get answer on questions, but I recommend speaking to him the day of the procedure, and keeping it scheduled. Pt verbally understood

## 2024-03-16 LAB
25(OH)D3+25(OH)D2 SERPL-MCNC: 32 NG/ML (ref 30–100)
ALBUMIN SERPL-MCNC: 4.3 G/DL (ref 3.5–5.7)
ALBUMIN/CREAT UR: 51.1
ALP SERPL-CCNC: 63 U/L (ref 35–120)
ALT SERPL-CCNC: 12 U/L
ANION GAP SERPL CALCULATED.3IONS-SCNC: 6 MMOL/L (ref 3–11)
AST SERPL-CCNC: 14 U/L
BILIRUB SERPL-MCNC: 0.4 MG/DL (ref 0.2–1)
BUN SERPL-MCNC: 17 MG/DL (ref 7–28)
CALCIUM SERPL-MCNC: 9.6 MG/DL (ref 8.5–10.1)
CHLORIDE SERPL-SCNC: 105 MMOL/L (ref 100–109)
CO2 SERPL-SCNC: 31 MMOL/L (ref 21–31)
CREAT SERPL-MCNC: 0.87 MG/DL (ref 0.53–1.3)
CREAT UR-MCNC: 91.9 MG/DL (ref 50–200)
CYTOLOGY CMNT CVX/VAG CYTO-IMP: ABNORMAL
EST. AVERAGE GLUCOSE BLD GHB EST-MCNC: 146 MG/DL
GFR/BSA.PRED SERPLBLD CYS-BASED-ARV: 100 ML/MIN/{1.73_M2}
GLUCOSE SERPL-MCNC: 136 MG/DL (ref 65–99)
HBA1C MFR BLD: 6.7 %
MICROALBUMIN UR-MCNC: 4.7 MG/DL
POTASSIUM SERPL-SCNC: 4.3 MMOL/L (ref 3.5–5.2)
PROT SERPL-MCNC: 7.1 G/DL (ref 6.3–8.3)
SODIUM SERPL-SCNC: 142 MMOL/L (ref 135–145)

## 2024-03-18 ENCOUNTER — HOSPITAL ENCOUNTER (OUTPATIENT)
Facility: HOSPITAL | Age: 59
Setting detail: OUTPATIENT SURGERY
Discharge: HOME | End: 2024-03-18
Attending: INTERNAL MEDICINE | Admitting: INTERNAL MEDICINE
Payer: COMMERCIAL

## 2024-03-18 VITALS
DIASTOLIC BLOOD PRESSURE: 88 MMHG | TEMPERATURE: 98.1 F | SYSTOLIC BLOOD PRESSURE: 126 MMHG | RESPIRATION RATE: 17 BRPM | OXYGEN SATURATION: 98 % | HEART RATE: 66 BPM

## 2024-03-18 DIAGNOSIS — R07.9 CHEST PAIN, UNSPECIFIED TYPE: ICD-10-CM

## 2024-03-18 DIAGNOSIS — Z95.5 S/P DRUG ELUTING CORONARY STENT PLACEMENT: Primary | ICD-10-CM

## 2024-03-18 LAB
ATRIAL RATE: 67 BPM
KCT BLD-ACNC: 277 SEC (ref 89–137)
P AXIS: 39 DEGREES
PR INTERVAL: 232 MS
QRS AXIS: 57 DEGREES
QRSD INTERVAL: 106 MS
QT INTERVAL: 400 MS
QTC INTERVAL: 422 MS
SPECIMEN SOURCE: ABNORMAL
T WAVE AXIS: 4 DEGREES
VENTRICULAR RATE: 67 BPM

## 2024-03-18 PROCEDURE — 99152 MOD SED SAME PHYS/QHP 5/>YRS: CPT | Performed by: INTERNAL MEDICINE

## 2024-03-18 PROCEDURE — C1769 GUIDE WIRE: HCPCS | Performed by: INTERNAL MEDICINE

## 2024-03-18 PROCEDURE — 85347 COAGULATION TIME ACTIVATED: CPT

## 2024-03-18 PROCEDURE — C9600 PERC DRUG-EL COR STENT SING: HCPCS | Performed by: INTERNAL MEDICINE

## 2024-03-18 PROCEDURE — C1725 CATH, TRANSLUMIN NON-LASER: HCPCS | Performed by: INTERNAL MEDICINE

## 2024-03-18 PROCEDURE — 99153 MOD SED SAME PHYS/QHP EA: CPT | Performed by: INTERNAL MEDICINE

## 2024-03-18 PROCEDURE — 93454 CORONARY ARTERY ANGIO S&I: CPT | Performed by: INTERNAL MEDICINE

## 2024-03-18 PROCEDURE — 92928 PRQ TCAT PLMT NTRAC ST 1 LES: CPT | Performed by: INTERNAL MEDICINE

## 2024-03-18 PROCEDURE — C1874 STENT, COATED/COV W/DEL SYS: HCPCS | Performed by: INTERNAL MEDICINE

## 2024-03-18 PROCEDURE — 93005 ELECTROCARDIOGRAM TRACING: CPT

## 2024-03-18 PROCEDURE — 93010 ELECTROCARDIOGRAM REPORT: CPT | Performed by: INTERNAL MEDICINE

## 2024-03-18 PROCEDURE — C1894 INTRO/SHEATH, NON-LASER: HCPCS | Performed by: INTERNAL MEDICINE

## 2024-03-18 DEVICE — XIENCE SKYPOINT™ EVEROLIMUS ELUTING CORONARY STENT SYSTEM 2.25 MM X 12 MM / RAPID-EXCHANGE
Type: IMPLANTABLE DEVICE | Status: FUNCTIONAL
Brand: XIENCE SKYPOINT™

## 2024-03-18 DEVICE — XIENCE SKYPOINT™ EVEROLIMUS ELUTING CORONARY STENT SYSTEM 2.50 MM X 18 MM / RAPID-EXCHANGE
Type: IMPLANTABLE DEVICE | Status: FUNCTIONAL
Brand: XIENCE SKYPOINT™

## 2024-03-18 RX ORDER — ONDANSETRON 2 MG/ML
4 INJECTION INTRAMUSCULAR; INTRAVENOUS EVERY 6 HOURS PRN
Status: DISCONTINUED | OUTPATIENT
Start: 2024-03-18 | End: 2024-03-18 | Stop reason: HOSPADM

## 2024-03-18 RX ORDER — SODIUM CHLORIDE 9 MG/ML
250 INJECTION, SOLUTION INTRAVENOUS CONTINUOUS
Status: DISPENSED | OUTPATIENT
Start: 2024-03-18 | End: 2024-03-18

## 2024-03-18 RX ORDER — LIDOCAINE HYDROCHLORIDE 10 MG/ML
INJECTION, SOLUTION EPIDURAL; INFILTRATION; INTRACAUDAL; PERINEURAL CODE/TRAUMA/SEDATION MEDICATION
Status: DISCONTINUED | OUTPATIENT
Start: 2024-03-18 | End: 2024-03-18 | Stop reason: HOSPADM

## 2024-03-18 RX ORDER — FENTANYL CITRATE 50 UG/ML
INJECTION, SOLUTION INTRAMUSCULAR; INTRAVENOUS CODE/TRAUMA/SEDATION MEDICATION
Status: DISCONTINUED | OUTPATIENT
Start: 2024-03-18 | End: 2024-03-18 | Stop reason: HOSPADM

## 2024-03-18 RX ORDER — NITROGLYCERIN 20 MG/100ML
INJECTION INTRAVENOUS CODE/TRAUMA/SEDATION MEDICATION
Status: DISCONTINUED | OUTPATIENT
Start: 2024-03-18 | End: 2024-03-18 | Stop reason: HOSPADM

## 2024-03-18 RX ORDER — ASPIRIN 81 MG/1
324 TABLET, CHEWABLE ORAL ONCE
Status: COMPLETED | OUTPATIENT
Start: 2024-03-18 | End: 2024-03-18

## 2024-03-18 RX ORDER — HEPARIN SODIUM 1000 [USP'U]/ML
INJECTION, SOLUTION INTRAVENOUS; SUBCUTANEOUS CODE/TRAUMA/SEDATION MEDICATION
Status: DISCONTINUED | OUTPATIENT
Start: 2024-03-18 | End: 2024-03-18 | Stop reason: HOSPADM

## 2024-03-18 RX ORDER — ACETAMINOPHEN 325 MG/1
650 TABLET ORAL EVERY 4 HOURS PRN
Status: DISCONTINUED | OUTPATIENT
Start: 2024-03-18 | End: 2024-03-18 | Stop reason: HOSPADM

## 2024-03-18 RX ORDER — VERAPAMIL HYDROCHLORIDE 2.5 MG/ML
INJECTION, SOLUTION INTRAVENOUS CODE/TRAUMA/SEDATION MEDICATION
Status: DISCONTINUED | OUTPATIENT
Start: 2024-03-18 | End: 2024-03-18 | Stop reason: HOSPADM

## 2024-03-18 RX ORDER — SODIUM CHLORIDE 9 MG/ML
125 INJECTION, SOLUTION INTRAVENOUS CONTINUOUS
Status: DISCONTINUED | OUTPATIENT
Start: 2024-03-18 | End: 2024-03-18

## 2024-03-18 RX ORDER — MIDAZOLAM HYDROCHLORIDE 2 MG/2ML
INJECTION, SOLUTION INTRAMUSCULAR; INTRAVENOUS CODE/TRAUMA/SEDATION MEDICATION
Status: DISCONTINUED | OUTPATIENT
Start: 2024-03-18 | End: 2024-03-18 | Stop reason: HOSPADM

## 2024-03-18 RX ADMIN — SODIUM CHLORIDE 125 ML/HR: 0.9 INJECTION, SOLUTION INTRAVENOUS at 06:46

## 2024-03-18 RX ADMIN — ASPIRIN 81 MG CHEWABLE TABLET 243 MG: 81 TABLET CHEWABLE at 06:57

## 2024-03-18 RX ADMIN — SODIUM CHLORIDE 250 ML/HR: 0.9 INJECTION, SOLUTION INTRAVENOUS at 11:33

## 2024-03-18 NOTE — DISCHARGE INSTR - AVS FIRST PAGE
1. Please see the post angioplasty discharge instructions.   No heavy lifting, greater than 10 lbs. or strenuous activity for 5 days.  Follow angioplasty discharge instructions.    2.Remove band aid tomorrow.  Shower and wash area- wrist gently with soap and water- beginning tomorrow. Rinse and pat dry.  Apply new water seal band aid.  Repeat this process for 5 days. No powders, creams lotions or antibiotic ointments  for 5 days.  No tub baths, hot tubs or swimming for 5 days.     3. Call [facility]'s Cardiology Office ([phone]) if you develop a fever, redness or drainage at your wrist access site.      4. No driving for 2 days    5. Do not stop aspirin or Brilinta (Ticagrelor) any reason without a cardiologist’s consent, or the stent could block up and cause a heart attack.    6. Stent card and book.

## 2024-03-18 NOTE — INTERVAL H&P NOTE
/73   Pulse 68   Temp 97.9 °F (36.6 °C) (Temporal)   Resp 16   SpO2 96%     H&P reviewed. After examining the patient, I find no changed to the H&P since it had been written.    Patient re-evaluated. Accept as history and physical.    Bob Fraser MD/March 18, 2024/7:19 AM

## 2024-03-20 ENCOUNTER — DOCUMENTATION (OUTPATIENT)
Dept: CARDIOLOGY CLINIC | Facility: CLINIC | Age: 59
End: 2024-03-20

## 2024-03-21 ENCOUNTER — TELEPHONE (OUTPATIENT)
Dept: OTHER | Facility: OTHER | Age: 59
End: 2024-03-21

## 2024-03-21 NOTE — TELEPHONE ENCOUNTER
PT. Called in stating he never received a return to work letter after his procedure and he is currently sitting in the parking lot at his job, and will wait to hear from office to come  paperwork, in order for him to return to work. Please call PT back when office re opens.

## 2024-03-25 ENCOUNTER — DOCUMENTATION (OUTPATIENT)
Dept: CARDIOLOGY CLINIC | Facility: CLINIC | Age: 59
End: 2024-03-25

## 2024-03-27 ENCOUNTER — OFFICE VISIT (OUTPATIENT)
Dept: ENDOCRINOLOGY | Facility: CLINIC | Age: 59
End: 2024-03-27
Payer: COMMERCIAL

## 2024-03-27 VITALS
TEMPERATURE: 98 F | WEIGHT: 207.2 LBS | DIASTOLIC BLOOD PRESSURE: 70 MMHG | OXYGEN SATURATION: 98 % | HEART RATE: 78 BPM | HEIGHT: 72 IN | SYSTOLIC BLOOD PRESSURE: 120 MMHG | BODY MASS INDEX: 28.06 KG/M2

## 2024-03-27 DIAGNOSIS — R80.9 MICROALBUMINURIA: ICD-10-CM

## 2024-03-27 DIAGNOSIS — E11.59 TYPE 2 DIABETES MELLITUS WITH OTHER CIRCULATORY COMPLICATION, WITHOUT LONG-TERM CURRENT USE OF INSULIN (HCC): Primary | ICD-10-CM

## 2024-03-27 DIAGNOSIS — E78.2 MIXED HYPERLIPIDEMIA: ICD-10-CM

## 2024-03-27 DIAGNOSIS — I10 PRIMARY HYPERTENSION: ICD-10-CM

## 2024-03-27 DIAGNOSIS — E11.21 TYPE 2 DIABETES MELLITUS WITH DIABETIC NEPHROPATHY, WITHOUT LONG-TERM CURRENT USE OF INSULIN (HCC): ICD-10-CM

## 2024-03-27 PROCEDURE — 99214 OFFICE O/P EST MOD 30 MIN: CPT | Performed by: STUDENT IN AN ORGANIZED HEALTH CARE EDUCATION/TRAINING PROGRAM

## 2024-03-27 NOTE — PROGRESS NOTES
Established patient Progress Note      Cc: diabetes    Referring Provider  No referring provider defined for this encounter.     History of Present Illness:   Fidel Calderon is a 58 y.o. male with a history of type 2 diabetes without long term use of insulin who presented for follow up.        Reports complications of microabuminuria, CAD s/p stents . Denies recent illness or hospitalizations. Denies recent severe hypoglycemic or severe hyperglycemic episodes. Denies any issues with his current regimen. home glucose monitoring: are performed regularly      Current regimen:   Metformin 1000 mg  once a day  Jardiance 25 mg once a day  Ozempic 1 mg once weekly    Home blood glucose readings:   Before breakfast: 100 - 130  Before lunch: < 140   Before dinner: < 140   Bedtime: X       Opthamology: UTD  Podiatry: No        Has hypertension: followed by PCP; on Irbesartan 75 mg daily  Has hyperlipidemia: followed by PCP; on Cretor 40 mg  - tolerating well, no myalgias.     Thyroid disorders: No  History of pancreatitis: No    Patient Active Problem List   Diagnosis    CAD (coronary artery disease)    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, greater than 8 weeks    Primary hypertension    Mixed hyperlipidemia    Status post insertion of drug eluting coronary artery stent    Type 2 diabetes mellitus with circulatory disorder, without long-term current use of insulin (HCC)    Microalbuminuria    Fatigue due to excessive exertion    Other chest pain      Past Medical History:   Diagnosis Date    Cardiac disease     Diabetes mellitus (HCC)     Hyperlipidemia     Hypertension     MI (myocardial infarction) (HCC)       Past Surgical History:   Procedure Laterality Date    CARDIAC CATHETERIZATION N/A 3/6/2023    Procedure: Cardiac catheterization;  Surgeon: Fernando Panda MD;  Location: [facility];   Service: Cardiology    CARDIAC CATHETERIZATION N/A 3/6/2023    Procedure: Cardiac pci;  Surgeon: Fernando Panda MD;  [facility];  Service: Cardiology    CARDIAC CATHETERIZATION N/A 3/6/2023    Procedure: Cardiac Coronary Angiogram;  Surgeon: Fernando Panda MD;  [facility];  Service: Cardiology    CARDIAC CATHETERIZATION Left 3/18/2024    Procedure: Cardiac catheterization;  Surgeon: Bob Fraser MD;  [facility]: [facility];  Service: Cardiology    CARDIAC CATHETERIZATION N/A 3/18/2024    Procedure: Cardiac pci;  Surgeon: Bob Fraser MD;  [facility]: [facility];  Service: Cardiology    CARDIAC SURGERY      CORONARY ANGIOPLASTY WITH STENT PLACEMENT      DEBRIDEMENT TENNIS ELBOW      HERNIA REPAIR      KNEE ARTHROSCOPY      SHOULDER ARTHROSCOPY      VASECTOMY        Family History   Problem Relation Age of Onset    Hypertension Maternal Grandfather     Diabetes type I Maternal Grandmother     Hypertension Paternal Grandfather     Diabetes type II Paternal Grandmother      Social History     Tobacco Use    Smoking status: Former     Current packs/day: 0.00     Average packs/day: 0.5 packs/day for 28.0 years (14.0 ttl pk-yrs)     Types: Cigarettes     Start date: 1977     Quit date: 2005     Years since quittin.2    Smokeless tobacco: Former     Types: Chew     Quit date: 1985   Substance Use Topics    Alcohol use: Yes     Comment: socially     Allergies   Allergen Reactions    Banana - Food Allergy Throat Swelling    Tetanus Toxoid Other (See Comments)         Current Outpatient Medications:     amLODIPine (NORVASC) 5 mg tablet, Take 1 tablet (5 mg total) by mouth daily, Disp: 90 tablet, Rfl: 3    aspirin (ECOTRIN LOW STRENGTH) 81 mg EC tablet, Take 1 tablet by mouth daily, Disp: , Rfl:     Cholecalciferol (VITAMIN D) 2000 units CAPS, Take by mouth, Disp: , Rfl:     Empagliflozin (Jardiance) 25 MG TABS, Take 1 tablet (25 mg total) by mouth  every morning, Disp: 90 tablet, Rfl: 1    irbesartan (AVAPRO) 75 mg tablet, Take 1 tablet (75 mg total) by mouth daily, Disp: 90 tablet, Rfl: 3    metFORMIN (GLUCOPHAGE) 1000 MG tablet, Take 1,000 mg by mouth daily with breakfast, Disp: , Rfl:     metoprolol succinate (TOPROL-XL) 25 mg 24 hr tablet, Take 1 tablet (25 mg total) by mouth daily, Disp: 30 tablet, Rfl: 0    nitroglycerin (NITROSTAT) 0.4 mg SL tablet, Place 1 tablet (0.4 mg total) under the tongue every 5 (five) minutes as needed for chest pain (Patient taking differently: Place 0.4 mg under the tongue every 5 (five) minutes as needed for chest pain PRN), Disp: 90 tablet, Rfl: 3    rosuvastatin (CRESTOR) 40 MG tablet, Take 1 tablet (40 mg total) by mouth daily, Disp: 120 tablet, Rfl: 3    semaglutide, 1 mg/dose, (Ozempic, 1 MG/DOSE,) 4 mg/3 mL injection pen, 1 mg weekly, Disp: 9 mL, Rfl: 0    tadalafil (CIALIS) 10 MG tablet, Take 10 mg by mouth as needed, Disp: , Rfl:     ticagrelor (BRILINTA) 90 MG, Take 1 tablet (90 mg total) by mouth 2 (two) times a day, Disp: 180 tablet, Rfl: 3    zolpidem (AMBIEN) 10 mg tablet, one tab at bedtime as needed, Disp: , Rfl:   Review of Systems   Constitutional:  Negative for appetite change, fatigue and unexpected weight change.   HENT:  Negative for trouble swallowing and voice change.    Eyes:  Negative for visual disturbance.   Respiratory:  Negative for cough, shortness of breath and wheezing.    Cardiovascular:  Negative for palpitations and leg swelling.   Gastrointestinal:  Negative for abdominal pain, constipation, diarrhea, nausea and vomiting.   Endocrine: Negative for cold intolerance, heat intolerance, polyphagia and polyuria.   Musculoskeletal:  Negative for arthralgias.   Skin:  Negative for color change, rash and wound.   Neurological:  Negative for dizziness, tremors, weakness, light-headedness, numbness and headaches.   Psychiatric/Behavioral:  Negative for agitation and sleep disturbance. The patient is  "not nervous/anxious.        Physical Exam:  Body mass index is 28.1 kg/m².  /70 (BP Location: Right arm, Patient Position: Sitting, Cuff Size: Standard)   Pulse 78   Temp 98 °F (36.7 °C)   Ht 6' (1.829 m)   Wt 94 kg (207 lb 3.2 oz)   SpO2 98%   BMI 28.10 kg/m²    Wt Readings from Last 3 Encounters:   03/27/24 94 kg (207 lb 3.2 oz)   03/05/24 93.8 kg (206 lb 14.4 oz)   03/02/24 90.7 kg (200 lb)       GEN: NAD  E/n/m nl facies,   Eyes: no stare or proptosis,   Resp: CTAB, good effort  Ab+BS  Neuro: no tremor,   Skin: warm and dry,   Ext:  no edema bilaterally,   Psych: nl mood and affect, no gross lapses in memory      Labs:   No components found for: \"HA1C\"  No components found for: \"GLU\"    Lab Results   Component Value Date    CREATININE 0.87 03/16/2024    CREATININE 1.04 03/02/2024    CREATININE 0.86 12/02/2023    BUN 17 03/16/2024     05/26/2018    K 4.3 03/16/2024     03/16/2024    CO2 31 03/16/2024     eGFRcr   Date Value Ref Range Status   09/06/2023 86 >59 Final     eGFR   Date Value Ref Range Status   03/16/2024 100 >59 Final   03/02/2024 78 ml/min/1.73sq m Final     No components found for: \"MALBCRER\"    Lab Results   Component Value Date    CHOL 141 05/26/2018    HDL 27 (L) 12/02/2023    TRIG 97 12/02/2023       Lab Results   Component Value Date    ALT 12 03/16/2024    AST 14 03/16/2024    ALKPHOS 63 03/16/2024    BILITOT 0.7 05/26/2018       Lab Results   Component Value Date    TSH 0.88 09/06/2023     Component      Latest Ref Rng 3/16/2024   GLUCOSE      65 - 99 mg/dL 136 (H)    BUN      7 - 28 mg/dL 17    Creatinine      0.53 - 1.30 mg/dL 0.87    Sodium      135 - 145 mmol/L 142    Potassium      3.5 - 5.2 mmol/L 4.3    Chloride      100 - 109 mmol/L 105    Carbon Dioxide      21 - 31 mmol/L 31    Calcium      8.5 - 10.1 mg/dL 9.6    ALK PHOS      35 - 120 U/L 63    Albumin      3.5 - 5.7 g/dL 4.3    Total Bilirubin      0.2 - 1.0 mg/dL 0.4    Total Protein      6.3 - 8.3 g/dL " 7.1    AST      <41 U/L 14    ALT      <56 U/L 12    ANION GAP      3 - 11  6    GFR, Calculated      >59  100    Comment (Note)    EXT Creatinine Urine      50.0 - 200.0 mg/dL 91.9    Albumin,U,Random      <3.0 mg/dL 4.7 (H)    Albumin Creat Ratio      <30.0 mg/gm CREA 51.1 (H)    Hemoglobin A1C      <5.7 % 6.7 (H)    eAG, EST AVG Glucose      mg/dL 146    25-Hydroxy, Vitamin D      30 - 100 ng/mL 32       Legend:  (H) High    Impression:  1. Type 2 diabetes mellitus with other circulatory complication, without long-term current use of insulin (HCC)    2. Microalbuminuria    3. Mixed hyperlipidemia    4. Type 2 diabetes mellitus with diabetic nephropathy, without long-term current use of insulin (HCC)    5. Primary hypertension           Plan:    Problem List Items Addressed This Visit          Cardiovascular and Mediastinum    Primary hypertension     Well-controlled, 120/70.  Continue irbesartan.           Type 2 diabetes mellitus with circulatory disorder, without long-term current use of insulin (HCC) - Primary       Lab Results   Component Value Date    HGBA1C 6.7 (H) 03/16/2024     Controlled with A1c of 6.7%.  He was encouraged to continue good work managing his diabetes.  He has tolerated current regimen which will be continued, Jardiance 25 mg daily, metformin 1000 mg daily and Ozempic 1 mg weekly.  He was instructed to continue checking his blood sugar once or twice a day and bring his sugar log to next visit.  Return back in 3 months.  Labs prior to next visit.         Relevant Medications    Empagliflozin (Jardiance) 25 MG TABS    semaglutide, 1 mg/dose, (Ozempic, 1 MG/DOSE,) 4 mg/3 mL injection pen    Other Relevant Orders    Hemoglobin A1C    Comprehensive metabolic panel    Albumin / creatinine urine ratio       Other    Mixed hyperlipidemia     Continue Crestor.           Microalbuminuria     We reviewed the importance of glycemic and hypertension control, currently on SGLT2i, as well as ARB's,  which will be continued.           Relevant Orders    Comprehensive metabolic panel    Albumin / creatinine urine ratio     Other Visit Diagnoses       Type 2 diabetes mellitus with diabetic nephropathy, without long-term current use of insulin (HCC)        Relevant Medications    Empagliflozin (Jardiance) 25 MG TABS    semaglutide, 1 mg/dose, (Ozempic, 1 MG/DOSE,) 4 mg/3 mL injection pen                  Discussed with the patient and all questioned fully answered. He will call me if any problems arise.    Counseled patient on diagnostic results, prognosis, risk and benefit of treatment options, instruction for management, importance of treatment compliance, Risk  factor reduction and impressions      Arsenio Holliday MD

## 2024-04-11 NOTE — PROGRESS NOTES
Cardiology Follow Up    Fidel Calderon  1965  [id number]  [facility]  [address] [facility]  [place] [address] [phone]    1. S/P drug eluting coronary stent placement        2. Hypertension, unspecified type        3. Mixed hyperlipidemia        4. Type 2 diabetes mellitus with other specified complication, without long-term current use of insulin (HCC)            Interval History:   Mr Fidel Calderon presented to [facility]'s ED on 3/02/24 with chest pain.  I am presented with a 3-week history of chest pain.  Metoprolol was stopped by Cardiology to evaluate if this would improve his exercise tolerance.  Stopping Metoprolol did not improve his symptoms.  Metoprolol restarted.  Amlodipine 2.5mg daily started.  He was instructed to follow up with Cardiology.      On 3/05/24 Fidel was seen in our office for a recent ED follow up visit.  He is experiencing severe CP with vigorous exertion.  His SBP elevating into the 200's after a short time with vigorous exercise.  Fidel admits to left sided persistent chest discomfort sitting in the office today, 1-2/10 relieved with sitting forward.  Brain denies chest discomfort with walking on a flat surface.  ESR and CRP WNL    On 3/18/24 Fidel underwent a cardiac catheterization which showed distal RCA 99% stenosis, first RPL 80% stenosis, status post PCI and Skypoint LACEY  placed across the 99% stenosis in the distal RCA, 0% residual stenosis.  Status post PCI with Xience Skypoint  drug-eluting stent placed across the first RPL stenosis, 0% residual stenosis.    Fidel presents to our office for a recent cardiac catheterization follow up visit. He denies CP, palpitations, lightheadedness, dizziness, or CP.  He is riding stationary bike 2-3 times a week, 35 min to one hour.  He admits to eating a steak last week otherwise following a cardiac diet.       Medical History   Primary Cardiologist  Dr Panda  IWMI  CAD   hx of % lesion in the first right posterolateral segment improved on a 5% residual stenosis.  This post Xience [employer] Rx drug-eluting stent placed across the percent stenosis in the second right posterior lateral segment, 0% residual stenosis  Hx of PCI with LACEY to 80% stenosis of mid LAD 3/06/2023  Hypertension  Hyperlipidemia 23 , TG 97, HDL 27, LDL 60   DM2 HgbA1C  6.7 on 3/16/24     Patient Active Problem List   Diagnosis    CAD (coronary artery disease)    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, greater than 8 weeks    Primary hypertension    Mixed hyperlipidemia    Status post insertion of drug eluting coronary artery stent    Type 2 diabetes mellitus with circulatory disorder, without long-term current use of insulin ([facility])    Microalbuminuria    Fatigue due to excessive exertion    Other chest pain     Past Medical History:   Diagnosis Date    Cardiac disease     Diabetes mellitus ([facility])     Hyperlipidemia     Hypertension     MI (myocardial infarction) ([facility])      Social History     Socioeconomic History    Marital status: /Civil Union     Spouse name: Not on file    Number of children: Not on file    Years of education: Not on file    Highest education level: Not on file   Occupational History    Not on file   Tobacco Use    Smoking status: Former     Current packs/day: 0.00     Average packs/day: 0.5 packs/day for 28.0 years (14.0 ttl pk-yrs)     Types: Cigarettes     Start date: 1977     Quit date: 2005     Years since quittin.2    Smokeless tobacco: Former     Types: Chew     Quit date: 1985   Vaping Use    Vaping status: Never Used   Substance and Sexual Activity    Alcohol use: Yes     Comment: socially    Drug use: No    Sexual activity: Yes     Birth control/protection: Post-menopausal   Other Topics Concern    Not on file   Social History Narrative    Not on file     Social Determinants of Health     Financial Resource Strain:  Not on file   Food Insecurity: Not on file   Transportation Needs: Not on file   Physical Activity: Not on file   Stress: Not on file   Social Connections: Not on file   Intimate Partner Violence: Not on file   Housing Stability: Not on file      Family History   Problem Relation Age of Onset    Hypertension Maternal Grandfather     Diabetes type I Maternal Grandmother     Hypertension Paternal Grandfather     Diabetes type II Paternal Grandmother      Past Surgical History:   Procedure Laterality Date    CARDIAC CATHETERIZATION N/A 3/6/2023    Procedure: Cardiac catheterization;  Surgeon: Fernando Panda MD;  Location: [facility];  Service: Cardiology    CARDIAC CATHETERIZATION N/A 3/6/2023    Procedure: Cardiac pci;  Surgeon: Fernando Panda MD;  Location: [facility];  Service: Cardiology    CARDIAC CATHETERIZATION N/A 3/6/2023    Procedure: Cardiac Coronary Angiogram;  Surgeon: Fernando Panda MD;  Location: [facility];  Service: Cardiology    CARDIAC CATHETERIZATION Left 3/18/2024    Procedure: Cardiac catheterization;  Surgeon: Bob Fraser MD;  Location: [facility];  Service: Cardiology    CARDIAC CATHETERIZATION N/A 3/18/2024    Procedure: Cardiac pci;  Surgeon: Bob Fraser MD;  Location: [facility];  Service: Cardiology    CARDIAC SURGERY      CORONARY ANGIOPLASTY WITH STENT PLACEMENT      DEBRIDEMENT TENNIS ELBOW      HERNIA REPAIR      KNEE ARTHROSCOPY      SHOULDER ARTHROSCOPY      VASECTOMY         Current Outpatient Medications:     amLODIPine (NORVASC) 5 mg tablet, Take 1 tablet (5 mg total) by mouth daily, Disp: 90 tablet, Rfl: 3    aspirin (ECOTRIN LOW STRENGTH) 81 mg EC tablet, Take 1 tablet by mouth daily, Disp: , Rfl:     Cholecalciferol (VITAMIN D) 2000 units CAPS, Take by mouth, Disp: , Rfl:     Empagliflozin (Jardiance) 25 MG TABS, Take 1 tablet (25 mg total) by mouth every morning, Disp: 90 tablet, Rfl: 1    irbesartan (AVAPRO) 75 mg tablet,  Take 1 tablet (75 mg total) by mouth daily, Disp: 90 tablet, Rfl: 3    metFORMIN (GLUCOPHAGE) 1000 MG tablet, Take 1,000 mg by mouth daily with breakfast, Disp: , Rfl:     metoprolol succinate (TOPROL-XL) 25 mg 24 hr tablet, Take 1 tablet (25 mg total) by mouth daily, Disp: 30 tablet, Rfl: 0    nitroglycerin (NITROSTAT) 0.4 mg SL tablet, Place 1 tablet (0.4 mg total) under the tongue every 5 (five) minutes as needed for chest pain (Patient taking differently: Place 0.4 mg under the tongue every 5 (five) minutes as needed for chest pain PRN), Disp: 90 tablet, Rfl: 3    rosuvastatin (CRESTOR) 40 MG tablet, Take 1 tablet (40 mg total) by mouth daily, Disp: 120 tablet, Rfl: 3    semaglutide, 1 mg/dose, (Ozempic, 1 MG/DOSE,) 4 mg/3 mL injection pen, 1 mg weekly, Disp: 9 mL, Rfl: 0    tadalafil (CIALIS) 10 MG tablet, Take 10 mg by mouth as needed, Disp: , Rfl:     ticagrelor (BRILINTA) 90 MG, Take 1 tablet (90 mg total) by mouth 2 (two) times a day, Disp: 180 tablet, Rfl: 3    zolpidem (AMBIEN) 10 mg tablet, one tab at bedtime as needed, Disp: , Rfl:   Allergies   Allergen Reactions    Banana - Food Allergy Throat Swelling    Tetanus Toxoid Other (See Comments)       Labs:  Admission on 03/18/2024, Discharged on 03/18/2024   Component Date Value    Ventricular Rate 03/18/2024 67     Atrial Rate 03/18/2024 67     MD Interval 03/18/2024 232     QRSD Interval 03/18/2024 106     QT Interval 03/18/2024 400     QTC Interval 03/18/2024 422     P Axis 03/18/2024 39     QRS Axis 03/18/2024 57     T Wave Axis 03/18/2024 4     Activated Clotting Time,* 03/18/2024 277 (H)     Specimen Type 03/18/2024 VENOUS    Orders Only on 03/16/2024   Component Date Value    Creatinine(Crt),U 03/16/2024 91.9     Albumin,U,Random 03/16/2024 4.7 (H)     Microalb/Creat Ratio 03/16/2024 51.1 (H)    Orders Only on 03/16/2024   Component Date Value    Glucose, Random 03/16/2024 136 (H)     BUN 03/16/2024 17     Creatinine 03/16/2024 0.87     Sodium  03/16/2024 142     Potassium 03/16/2024 4.3     Chloride 03/16/2024 105     CO2 03/16/2024 31     Calcium 03/16/2024 9.6     Alkaline Phosphatase 03/16/2024 63     Albumin 03/16/2024 4.3     TOTAL BILIRUBIN 03/16/2024 0.4     Protein, Total 03/16/2024 7.1     AST 03/16/2024 14     ALT 03/16/2024 12     ANION GAP 03/16/2024 6     eGFR 03/16/2024 100     Comment 03/16/2024 (Note)     25-HYDROXY VIT D 03/16/2024 32     Hemoglobin A1C 03/16/2024 6.7 (H)     Estimated Average Glucose 03/16/2024 146    Orders Only on 03/05/2024   Component Date Value    Sedimentation Rate 03/05/2024 11     C-Reactive Protein, Quant 03/05/2024 1.2    Admission on 03/02/2024, Discharged on 03/02/2024   Component Date Value    Ventricular Rate 03/02/2024 95     Atrial Rate 03/02/2024 95     [place] Interval 03/02/2024 206     QRSD Interval 03/02/2024 102     QT Interval 03/02/2024 362     QTC Interval 03/02/2024 454     P Axis 03/02/2024 48     QRS Axis 03/02/2024 88     T Wave [place] 03/02/2024 16     WBC 03/02/2024 9.14     RBC 03/02/2024 5.38     Hemoglobin 03/02/2024 17.1 (H)     Hematocrit 03/02/2024 50.7 (H)     MCV 03/02/2024 94     MCH 03/02/2024 31.8     MCHC 03/02/2024 33.7     RDW 03/02/2024 11.6     MPV 03/02/2024 10.2     Platelets 03/02/2024 199     nRBC 03/02/2024 0     Segmented % 03/02/2024 61     Immature Grans % 03/02/2024 0     Lymphocytes % 03/02/2024 28     Monocytes % 03/02/2024 7     Eosinophils Relative 03/02/2024 3     Basophils Relative 03/02/2024 1     Absolute Neutrophils 03/02/2024 5.56     Absolute Immature Grans 03/02/2024 0.02     Absolute Lymphocytes 03/02/2024 2.56     Absolute Monocytes 03/02/2024 0.62     Eosinophils Absolute 03/02/2024 0.31     Basophils Absolute 03/02/2024 0.07     Sodium 03/02/2024 138     Potassium 03/02/2024 4.1     Chloride 03/02/2024 100     CO2 03/02/2024 30     ANION GAP 03/02/2024 8     BUN 03/02/2024 24     Creatinine 03/02/2024 1.04     Glucose 03/02/2024 140     Calcium 03/02/2024  10.0     eGFR 03/02/2024 78     hs TnI 0hr 03/02/2024 9     BNP 03/02/2024 36     Ventricular Rate 03/02/2024 83     Atrial Rate 03/02/2024 83     AR Interval 03/02/2024 210     QRSD Interval 03/02/2024 102     QT Interval 03/02/2024 378     QTC Interval 03/02/2024 444     P Axis 03/02/2024 53     QRS Axis 03/02/2024 84     T Wave [place] 03/02/2024 27     hs TnI 2hr 03/02/2024 6     Delta 2hr hsTnI 03/02/2024 -3     Ventricular Rate 03/02/2024 88     Atrial Rate 03/02/2024 88     AR Interval 03/02/2024 218     QRSD Interval 03/02/2024 100     QT Interval 03/02/2024 378     QTC Interval 03/02/2024 457     P Axis 03/02/2024 57     QRS Axis 03/02/2024 83     T Wave [place] 03/02/2024 7      Imaging: Cardiac catheterization    Result Date: 3/18/2024  Narrative:   Dist RCA lesion is 99% stenosed.   1st RPL lesion is 80% stenosed. Patent Stent LAD and PLV Severe dRCA disease (intervened on)      Review of Systems:  Review of Systems   All other systems reviewed and are negative.      Physical Exam:  Physical Exam  Vitals reviewed.   Constitutional:       Appearance: Normal appearance.   Cardiovascular:      Rate and Rhythm: Normal rate and regular rhythm.      Pulses: Normal pulses.      Heart sounds: Normal heart sounds.   Pulmonary:      Effort: Pulmonary effort is normal.      Breath sounds: Normal breath sounds. Stridor present.   Musculoskeletal:         General: Normal range of motion.      Cervical back: Normal range of motion and neck supple.      Right lower leg: No edema.      Left lower leg: No edema.   Skin:     General: Skin is warm and dry.      Capillary Refill: Capillary refill takes less than 2 seconds.   Neurological:      General: No focal deficit present.      Mental Status: He is alert and oriented to person, place, and time.   Psychiatric:         Mood and Affect: Mood normal.         Behavior: Behavior normal.         Discussion/Summary:  # 3/18/24 sp status post PCI and Skypoint LACEY  placed across the  99% stenosis in the distal RCA, 0% residual stenosis.  Status post PCI with Xience Skypoint  drug-eluting stent placed across the first RPL stenosis, 0% residual stenosis.  Refusing cardiac rehabilitation.  Exercising using stationary bike 2-3 times a week 30 minutes to 1 hour.  Continue on been 81 mg daily, Brilinta 90 mg twice daily is aware not to stop for any reason.  Continue on Avapro 75 mg daily, amlodipine 5 mg daily, overall succinate 25 mg daily heart healthy diet  # Hypertension controlled on current medical regimen  # Hyperlipidemia 12/02/23 , TG 97, HDL 27, LDL 60 LDL below goal continue on Crestor 40 mg daily, heart healthy diet  # DM2 HgbA1C 6.7 on 3/16/24 continue on Jardiance 25 mg daily Ozempic 1 mg a week follow-up with endocrinology

## 2024-04-15 ENCOUNTER — OFFICE VISIT (OUTPATIENT)
Dept: CARDIOLOGY CLINIC | Facility: CLINIC | Age: 59
End: 2024-04-15
Payer: COMMERCIAL

## 2024-04-15 VITALS
DIASTOLIC BLOOD PRESSURE: 70 MMHG | WEIGHT: 203 LBS | SYSTOLIC BLOOD PRESSURE: 100 MMHG | HEIGHT: 72 IN | HEART RATE: 69 BPM | BODY MASS INDEX: 27.5 KG/M2 | OXYGEN SATURATION: 97 %

## 2024-04-15 DIAGNOSIS — I10 HYPERTENSION, UNSPECIFIED TYPE: ICD-10-CM

## 2024-04-15 DIAGNOSIS — Z95.5 S/P DRUG ELUTING CORONARY STENT PLACEMENT: Primary | ICD-10-CM

## 2024-04-15 DIAGNOSIS — E78.2 MIXED HYPERLIPIDEMIA: ICD-10-CM

## 2024-04-15 DIAGNOSIS — E11.69 TYPE 2 DIABETES MELLITUS WITH OTHER SPECIFIED COMPLICATION, WITHOUT LONG-TERM CURRENT USE OF INSULIN (HCC): ICD-10-CM

## 2024-04-15 PROCEDURE — 99214 OFFICE O/P EST MOD 30 MIN: CPT | Performed by: NURSE PRACTITIONER

## 2024-04-19 DIAGNOSIS — I25.10 CORONARY ARTERY DISEASE INVOLVING NATIVE CORONARY ARTERY OF NATIVE HEART WITHOUT ANGINA PECTORIS: ICD-10-CM

## 2024-04-19 DIAGNOSIS — E78.00 HYPERCHOLESTEROLEMIA: ICD-10-CM

## 2024-04-19 DIAGNOSIS — I10 PRIMARY HYPERTENSION: ICD-10-CM

## 2024-04-19 DIAGNOSIS — Z95.5 S/P DRUG ELUTING CORONARY STENT PLACEMENT: ICD-10-CM

## 2024-04-19 RX ORDER — IRBESARTAN 75 MG/1
75 TABLET ORAL DAILY
Qty: 90 TABLET | Refills: 1 | Status: SHIPPED | OUTPATIENT
Start: 2024-04-19

## 2024-04-25 DIAGNOSIS — E11.69 TYPE 2 DIABETES MELLITUS WITH OTHER SPECIFIED COMPLICATION, WITHOUT LONG-TERM CURRENT USE OF INSULIN (HCC): ICD-10-CM

## 2024-04-25 NOTE — TELEPHONE ENCOUNTER
Medication Refill Request     Name   ticagrelor (BRILINTA) 90 MG    Dose/Frequency Take 1 tablet (90 mg total) by mouth 2 (two) times a day   Quantity 180 tablet   Verified pharmacy   [x]  Verified ordering Provider   [x]  Does patient have enough for the next 3 days? Yes [] No [x]  .    Pt is also requesting that a 10 day supply for   ticagrelor (BRILINTA) 90 MG  be sent to his local pharmacy and a 90 day supply be sent to [facility] mail delivery.   Please send to   [facility] [phone], [facility] [address]  Phone: [phone]  Fax: [phone]

## 2024-04-26 NOTE — TELEPHONE ENCOUNTER
Patient follows with providers at [facility], a second script is pending in that pool. Please refuse this duplicate.

## 2024-04-29 DIAGNOSIS — E11.69 TYPE 2 DIABETES MELLITUS WITH OTHER SPECIFIED COMPLICATION, WITHOUT LONG-TERM CURRENT USE OF INSULIN (HCC): ICD-10-CM

## 2024-04-29 RX ORDER — TICAGRELOR 90 MG/1
90 TABLET ORAL 2 TIMES DAILY
Qty: 180 TABLET | Refills: 3 | Status: SHIPPED | OUTPATIENT
Start: 2024-04-29

## 2024-04-29 NOTE — TELEPHONE ENCOUNTER
Patient is completely out and needs sent to mail order as well as short term to local pharmacy. Medications qued up appropriately. Just needs signed off    Reason for call:   [x] Refill   [] Prior Auth  [] Other:     Office:   [] PCP/Provider -   [x] Specialty/Provider - Cardio    Medication:   Ticagrelor 90mg- take 1 tablet by mouth 2 times a day      Pharmacy: [facility]    Does the patient have enough for 3 days?   [] Yes   [x] No - Send as HP to POD

## 2024-05-14 NOTE — TELEPHONE ENCOUNTER
Diabetes Education Scheduling Outreach #1:    Call to patient to schedule. Left message with phone number to call to schedule.    Plan for 2nd outreach attempt within 1 week.    iNka Amaya  [facility]  Diabetes and Nutrition Scheduling       Requested medication(s) are due for refill today: Yes  Patient has already received a courtesy refill: No  Other reason request has been forwarded to provider:

## 2024-06-07 DIAGNOSIS — E78.00 HYPERCHOLESTEROLEMIA: ICD-10-CM

## 2024-06-09 RX ORDER — ROSUVASTATIN CALCIUM 40 MG/1
40 TABLET, COATED ORAL DAILY
Qty: 90 TABLET | Refills: 1 | Status: SHIPPED | OUTPATIENT
Start: 2024-06-09

## 2024-06-18 ENCOUNTER — DOCUMENTATION (OUTPATIENT)
Dept: CARDIOLOGY CLINIC | Facility: CLINIC | Age: 59
End: 2024-06-18

## 2024-06-19 DIAGNOSIS — E11.59 TYPE 2 DIABETES MELLITUS WITH OTHER CIRCULATORY COMPLICATION, WITHOUT LONG-TERM CURRENT USE OF INSULIN (HCC): ICD-10-CM

## 2024-06-19 RX ORDER — SEMAGLUTIDE 1.34 MG/ML
INJECTION, SOLUTION SUBCUTANEOUS
Qty: 9 ML | Refills: 1 | Status: SHIPPED | OUTPATIENT
Start: 2024-06-19

## 2024-07-02 DIAGNOSIS — E11.69 TYPE 2 DIABETES MELLITUS WITH OTHER SPECIFIED COMPLICATION, WITHOUT LONG-TERM CURRENT USE OF INSULIN (HCC): ICD-10-CM

## 2024-07-02 DIAGNOSIS — I25.10 CORONARY ARTERY DISEASE INVOLVING NATIVE CORONARY ARTERY OF NATIVE HEART WITHOUT ANGINA PECTORIS: ICD-10-CM

## 2024-07-02 RX ORDER — METOPROLOL SUCCINATE 25 MG/1
25 TABLET, EXTENDED RELEASE ORAL DAILY
Qty: 30 TABLET | Refills: 0 | Status: SHIPPED | OUTPATIENT
Start: 2024-07-02 | End: 2024-08-01

## 2024-07-02 NOTE — TELEPHONE ENCOUNTER
Reason for call:   [x] Refill   [] Prior Auth  [] Other:     Office:   [] PCP/Provider -   [x] Specialty/Provider -         Does the patient have enough for 3 days?   [] Yes   [x] No - Send as HP to POD

## 2024-07-08 DIAGNOSIS — E11.69 TYPE 2 DIABETES MELLITUS WITH OTHER SPECIFIED COMPLICATION, WITHOUT LONG-TERM CURRENT USE OF INSULIN (HCC): ICD-10-CM

## 2024-07-08 DIAGNOSIS — I25.10 CORONARY ARTERY DISEASE INVOLVING NATIVE CORONARY ARTERY OF NATIVE HEART WITHOUT ANGINA PECTORIS: ICD-10-CM

## 2024-07-08 NOTE — TELEPHONE ENCOUNTER
Medication: metoprolol     Dose/Frequency: 25 mg qd    Quantity: 90    [facility]     Office:   [] PCP/Provider -   [x] Speciality/Provider -     Does the patient have enough for 3 days?   [] Yes   [x] No - Send as HP to POD     Pt called has ran out of metoprolol, it was sent in for a 30 day supply but [facility] days supply, has been out of medication for one week.     If can sent in asap will have it by wed or Thursday.

## 2024-07-10 ENCOUNTER — OFFICE VISIT (OUTPATIENT)
Dept: ENDOCRINOLOGY | Facility: CLINIC | Age: 59
End: 2024-07-10
Payer: COMMERCIAL

## 2024-07-10 VITALS
BODY MASS INDEX: 27.72 KG/M2 | WEIGHT: 204.4 LBS | HEART RATE: 76 BPM | SYSTOLIC BLOOD PRESSURE: 136 MMHG | TEMPERATURE: 98.3 F | DIASTOLIC BLOOD PRESSURE: 70 MMHG | OXYGEN SATURATION: 98 %

## 2024-07-10 DIAGNOSIS — E78.2 MIXED HYPERLIPIDEMIA: ICD-10-CM

## 2024-07-10 DIAGNOSIS — E11.59 TYPE 2 DIABETES MELLITUS WITH OTHER CIRCULATORY COMPLICATION, WITHOUT LONG-TERM CURRENT USE OF INSULIN (HCC): Primary | ICD-10-CM

## 2024-07-10 DIAGNOSIS — I10 PRIMARY HYPERTENSION: ICD-10-CM

## 2024-07-10 DIAGNOSIS — E11.21 TYPE 2 DIABETES MELLITUS WITH DIABETIC NEPHROPATHY, WITHOUT LONG-TERM CURRENT USE OF INSULIN (HCC): ICD-10-CM

## 2024-07-10 DIAGNOSIS — R80.9 MICROALBUMINURIA: ICD-10-CM

## 2024-07-10 PROCEDURE — 99214 OFFICE O/P EST MOD 30 MIN: CPT | Performed by: STUDENT IN AN ORGANIZED HEALTH CARE EDUCATION/TRAINING PROGRAM

## 2024-07-10 RX ORDER — METOPROLOL SUCCINATE 25 MG/1
25 TABLET, EXTENDED RELEASE ORAL DAILY
Qty: 100 TABLET | Refills: 1 | Status: SHIPPED | OUTPATIENT
Start: 2024-07-10 | End: 2025-01-26

## 2024-07-10 NOTE — PROGRESS NOTES
Established patient Progress Note      Cc: diabetes    Referring Provider  No referring provider defined for this encounter.     History of Present Illness:   Fidel Calderon is a 58 y.o. male with a history of type 2 diabetes without long term use of insulin who presented for follow up.        Reports complications of microabuminuria, CAD s/p stents. Denies recent illness or hospitalizations.    Current regimen:   Metformin 1000 mg  once a day  Jardiance 25 mg once a day  Ozempic 1 mg once weekly     SMBG : x 3,   Blood sugars ranging from 100  to 150 mg/dl    Hypoglycemic episodes:     Opthamology:  UTD;   Podiatry: No      Has hypertension: followed by PCP; on Irbesartan 75 mg daily  Has hyperlipidemia: followed by PCP; on Cretor 40 mg  - tolerating well, no myalgias.     Thyroid disorders: No  History of pancreatitis: No    Patient Active Problem List   Diagnosis    CAD (coronary artery disease)    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, greater than 8 weeks    Primary hypertension    Mixed hyperlipidemia    Status post insertion of drug eluting coronary artery stent    Type 2 diabetes mellitus with circulatory disorder, without long-term current use of insulin (HCC)    Microalbuminuria    Fatigue due to excessive exertion    Other chest pain      Past Medical History:   Diagnosis Date    Cardiac disease     Diabetes mellitus (HCC)     Hyperlipidemia     Hypertension     MI (myocardial infarction) (HCC)       Past Surgical History:   Procedure Laterality Date    CARDIAC CATHETERIZATION N/A 3/6/2023    Procedure: Cardiac catheterization;  Surgeon: Fernando Panda MD;  Location: [facility];  Service: Cardiology    CARDIAC CATHETERIZATION N/A 3/6/2023    Procedure: Cardiac pci;  Surgeon: Fernando Panda MD;  [facility];  Service: Cardiology    CARDIAC CATHETERIZATION  N/A 3/6/2023    Procedure: Cardiac Coronary Angiogram;  Surgeon: Fernando Panda MD;  Location: [facility];  Service: Cardiology    CARDIAC CATHETERIZATION Left 3/18/2024    Procedure: Cardiac catheterization;  Surgeon: Bob Fraser MD;  [facility]: [facility];  Service: Cardiology    CARDIAC CATHETERIZATION N/A 3/18/2024    Procedure: Cardiac pci;  Surgeon: Bob Fraser MD;  Location: [facility];  Service: Cardiology    CARDIAC SURGERY      CORONARY ANGIOPLASTY WITH STENT PLACEMENT      DEBRIDEMENT TENNIS ELBOW      HERNIA REPAIR      KNEE ARTHROSCOPY      SHOULDER ARTHROSCOPY      VASECTOMY        Family History   Problem Relation Age of Onset    Hypertension Maternal Grandfather     Diabetes type I Maternal Grandmother     Hypertension Paternal Grandfather     Diabetes type II Paternal Grandmother      Social History     Tobacco Use    Smoking status: Former     Current packs/day: 0.00     Average packs/day: 0.5 packs/day for 28.0 years (14.0 ttl pk-yrs)     Types: Cigarettes     Start date: 1977     Quit date: 2005     Years since quittin.5    Smokeless tobacco: Former     Types: Chew     Quit date: 1985   Substance Use Topics    Alcohol use: Yes     Comment: socially     Allergies   Allergen Reactions    Banana - Food Allergy Throat Swelling    Tetanus Toxoid Other (See Comments)         Current Outpatient Medications:     amLODIPine (NORVASC) 5 mg tablet, Take 1 tablet (5 mg total) by mouth daily, Disp: 90 tablet, Rfl: 3    aspirin (ECOTRIN LOW STRENGTH) 81 mg EC tablet, Take 1 tablet by mouth daily, Disp: , Rfl:     Brilinta 90 MG, TAKE 1 TABLET TWICE A DAY, Disp: 180 tablet, Rfl: 3    Cholecalciferol (VITAMIN D) 2000 units CAPS, Take by mouth, Disp: , Rfl:     Empagliflozin (Jardiance) 25 MG TABS, Take 1 tablet (25 mg total) by mouth every morning, Disp: 90 tablet, Rfl: 1    irbesartan (AVAPRO) 75 mg tablet, TAKE 1 TABLET DAILY, Disp: 90 tablet, Rfl: 1     metFORMIN (GLUCOPHAGE) 1000 MG tablet, Take 1,000 mg by mouth daily with breakfast, Disp: , Rfl:     metoprolol succinate (TOPROL-XL) 25 mg 24 hr tablet, Take 1 tablet (25 mg total) by mouth daily, Disp: 100 tablet, Rfl: 1    rosuvastatin (CRESTOR) 40 MG tablet, TAKE 1 TABLET DAILY, Disp: 90 tablet, Rfl: 1    semaglutide, 1 mg/dose, (Ozempic, 1 MG/DOSE,) 4 mg/3 mL injection pen, Inject 0.75 mL (1 mg total) under the skin every 7 days, Disp: 9 mL, Rfl: 1    tadalafil (CIALIS) 10 MG tablet, Take 10 mg by mouth as needed, Disp: , Rfl:     ticagrelor (BRILINTA) 90 MG, Take 1 tablet (90 mg total) by mouth 2 (two) times a day, Disp: 180 tablet, Rfl: 0    ticagrelor (Brilinta) 90 MG, Take 1 tablet (90 mg total) by mouth 2 (two) times a day, Disp: 20 tablet, Rfl: 0    zolpidem (AMBIEN) 10 mg tablet, one tab at bedtime as needed, Disp: , Rfl:     nitroglycerin (NITROSTAT) 0.4 mg SL tablet, Place 1 tablet (0.4 mg total) under the tongue every 5 (five) minutes as needed for chest pain (Patient not taking: Reported on 4/15/2024), Disp: 90 tablet, Rfl: 3  Review of Systems   Constitutional:  Negative for appetite change, fatigue and unexpected weight change.   HENT:  Negative for trouble swallowing.    Eyes:  Negative for visual disturbance.   Cardiovascular:  Negative for chest pain and palpitations.   Gastrointestinal:  Negative for abdominal pain, constipation, diarrhea, nausea and vomiting.   Endocrine: Negative for polydipsia, polyphagia and polyuria.       Physical Exam:  Body mass index is 27.72 kg/m².  /70 (BP Location: Right arm, Patient Position: Sitting, Cuff Size: Adult)   Pulse 76   Temp 98.3 °F (36.8 °C)   Wt 92.7 kg (204 lb 6.4 oz)   SpO2 98%   BMI 27.72 kg/m²    Wt Readings from Last 3 Encounters:   07/10/24 92.7 kg (204 lb 6.4 oz)   04/15/24 92.1 kg (203 lb)   03/27/24 94 kg (207 lb 3.2 oz)       GEN: NAD  E/n/m nl facies,   Eyes: no stare or proptosis,   Neck: trachea midline, thyroid NT to  "palpation, nl in size, no nodules or neck masses noted,   CV; heart reg rate s1s2 nl, no Murmur   Resp: CTAB, good effort  Neuro: no tremor,   Skin: warm and dry,   Ext:  no edema bilaterally,   Psych: nl mood and affect, no gross lapses in memory      Labs:   No components found for: \"HA1C\"  No components found for: \"GLU\"    Lab Results   Component Value Date    CREATININE 0.91 05/11/2024    CREATININE 0.87 03/16/2024    CREATININE 1.04 03/02/2024    BUN 21 05/11/2024     05/26/2018    K 4.1 05/11/2024     05/11/2024    CO2 26 05/11/2024     eGFRcr   Date Value Ref Range Status   05/11/2024 97 >59 Final     No components found for: \"MALBCRER\"    Lab Results   Component Value Date    CHOL 141 05/26/2018    HDL 27 (L) 12/02/2023    TRIG 97 12/02/2023       Lab Results   Component Value Date    ALT 12 05/11/2024    AST 15 05/11/2024    ALKPHOS 62 05/11/2024    BILITOT 0.7 05/26/2018       Lab Results   Component Value Date    TSH 0.88 09/06/2023    FREET4 0.82 09/06/2023     tains abnormal data ALBUMIN, URINE  Order: [id number]  Component  Ref Range & Units Value   Albumin,U,Random  <3.0 mg/dL 3.5 High      Component  Ref Range & Units 5/11/24  7:23 AM 3/16/24  7:11 AM 12/2/23  7:22 AM 9/6/23  7:11 AM 9/6/23 12:00 AM 6/10/23  7:07 AM 3/9/23  9:02 AM   Hemoglobin A1C  <5.7 % 6.4 High  6.7 High  CM 6.4 High  R 6.6 High  CM      LIPID PANEL  Order: [id number]  Component  Ref Range & Units 5/11/24  7:23 AM   Cholesterol  <200 mg/dL 85   Triglycerides  <150 mg/dL 75   Cholesterol, HDL, Direct  23 - 92 mg/dL 24   Cholesterol, Non-HDL  <160 mg/dL 61   LDL Cholesterol  <130 mg/dL 46   Comment: LDL Cholesterol was calculated using the Friedewald equation. Direct measurement of LDL is not indicated for this patient based on [facility]'s analytical algorithm for measurement of LDL Cholesterol.   Chol/HDL Ratio 3.5        Contains abnormal data COMPREHENSIVE METABOLIC PANEL  Order: [id number]   Status: Final result       Next " appt: 08/13/2024 at 04:00 PM in Cardiology (Fernando Panda MD)              Component  Ref Range & Units 5/11/24  7:23 AM 3/16/24  7:11 AM 3/2/24  8:18 AM 12/2/23  7:22 AM 9/6/23  7:11 AM 6/10/23  7:07 AM 3/7/23  5:17 AM   Glucose  65 - 99 mg/dL 108 High  136 High  140 R, CM  100 High  109 High  227 High  R, CM   BUN  7 - 28 mg/dL 21 17 24 R 20 R 16 19 14 R   Creatinine  0.53 - 1.30 mg/dL 0.91 0.87 1.04 R, CM 0.86 R, CM 1.01 1.04 0.92 R, CM   Sodium  135 - 145 mmol/L 138 142 138 R 140 R 138 139 136 R   Potassium  3.5 - 5.2 mmol/L 4.1 4.3 4.1 R 4.0 R 3.7 4.3 4.0 R   Chloride  100 - 109 mmol/L 103 105 100 R 102 R 101 101 103 R   Carbon Dioxide  21 - 31 mmol/L 26 31 30 R 32 R 27 26 26 R   Calcium  8.5 - 10.1 mg/dL 9.6 9.6 10.0 R 9.5 R 9.7 9.8 9.2 R   Alkaline Phosphatase  35 - 120 U/L 62 63  61 R 50 64    ALBUMIN  3.5 - 5.7 g/dL 4.7 4.3  4.8 R 4.7 4.9    Total Bilirubin  0.2 - 1.0 mg/dL 0.5 0.4 CM  0.52 R, CM 0.8 CM 0.5 CM    Comment: Eltrombopag and its metabolites may interfere with this assay causing erroneously high patient results.   Protein, Total  6.3 - 8.3 g/dL 7.7 7.1  7.8 R 7.4 7.8    AST  <41 U/L 15 14  18 R 19 17    ALT  <56 U/L 12 12  16 R, CM 14 15    ANION GAP  3 - 11 9 6 8 R 6 R 10 12 High  7 R   eGFRcr  >59 97 100 78 R            Impression:  1. Type 2 diabetes mellitus with other circulatory complication, without long-term current use of insulin (HCC)    2. Primary hypertension    3. Mixed hyperlipidemia    4. Microalbuminuria    5. Type 2 diabetes mellitus with diabetic nephropathy, without long-term current use of insulin (HCC)           Plan:    Problem List Items Addressed This Visit          Cardiovascular and Mediastinum    Primary hypertension     Controlled, 130/70, currently on irbesartan 75 mg daily which will be continued.           Relevant Orders    Comprehensive metabolic panel    Albumin / creatinine urine ratio    Type 2 diabetes mellitus with circulatory disorder, without long-term  current use of insulin ([facility]) - Primary       Lab Results   Component Value Date    HGBA1C 6.4 (H) 05/11/2024   Diabetes is well-controlled with A1c of 6.4%, he was encouraged to continue his effort managing diabetes.  He has tolerated current regimen which will be maintained.  Ophthalmology and podiatry follow-up.  Return back in 3 months with  Labs prior to next visit.         Relevant Medications    Empagliflozin (Jardiance) 25 MG TABS    semaglutide, 1 mg/dose, (Ozempic, 1 MG/DOSE,) 4 mg/3 mL injection pen    Other Relevant Orders    Hemoglobin A1C    Comprehensive metabolic panel    Albumin / creatinine urine ratio       Other    Mixed hyperlipidemia     Continue Crestor 40 mg daily.         Microalbuminuria     Continue Jardiance and irbesartan.           Relevant Orders    Albumin / creatinine urine ratio     Other Visit Diagnoses       Type 2 diabetes mellitus with diabetic nephropathy, without long-term current use of insulin ([facility])        Relevant Medications    Empagliflozin (Jardiance) 25 MG TABS    semaglutide, 1 mg/dose, (Ozempic, 1 MG/DOSE,) 4 mg/3 mL injection pen                  Discussed with the patient and all questioned fully answered. He will call me if any problems arise.    Counseled patient on diagnostic results, prognosis, risk and benefit of treatment options, instruction for management, importance of treatment compliance, Risk  factor reduction and impressions      Arsenio Holliday MD

## 2024-07-10 NOTE — ASSESSMENT & PLAN NOTE
Lab Results   Component Value Date    HGBA1C 6.4 (H) 05/11/2024   Diabetes is well-controlled with A1c of 6.4%, he was encouraged to continue his effort managing diabetes.  He has tolerated current regimen which will be maintained.  Ophthalmology and podiatry follow-up.  Return back in 3 months with  Labs prior to next visit.

## 2024-07-19 ENCOUNTER — OFFICE VISIT (OUTPATIENT)
Dept: OBGYN CLINIC | Facility: CLINIC | Age: 59
End: 2024-07-19
Payer: OTHER MISCELLANEOUS

## 2024-07-19 VITALS
HEIGHT: 72 IN | BODY MASS INDEX: 27.63 KG/M2 | SYSTOLIC BLOOD PRESSURE: 125 MMHG | DIASTOLIC BLOOD PRESSURE: 70 MMHG | WEIGHT: 204 LBS

## 2024-07-19 DIAGNOSIS — M79.641 BILATERAL HAND PAIN: Primary | ICD-10-CM

## 2024-07-19 DIAGNOSIS — M79.642 BILATERAL HAND PAIN: Primary | ICD-10-CM

## 2024-07-19 DIAGNOSIS — R20.0 NUMBNESS AND TINGLING IN BOTH HANDS: ICD-10-CM

## 2024-07-19 DIAGNOSIS — R20.2 NUMBNESS AND TINGLING IN BOTH HANDS: ICD-10-CM

## 2024-07-19 PROCEDURE — 20600 DRAIN/INJ JOINT/BURSA W/O US: CPT | Performed by: ORTHOPAEDIC SURGERY

## 2024-07-19 PROCEDURE — 99204 OFFICE O/P NEW MOD 45 MIN: CPT | Performed by: ORTHOPAEDIC SURGERY

## 2024-07-19 RX ORDER — BETAMETHASONE SODIUM PHOSPHATE AND BETAMETHASONE ACETATE 3; 3 MG/ML; MG/ML
3 INJECTION, SUSPENSION INTRA-ARTICULAR; INTRALESIONAL; INTRAMUSCULAR; SOFT TISSUE
Status: COMPLETED | OUTPATIENT
Start: 2024-07-19 | End: 2024-07-19

## 2024-07-19 RX ORDER — ROPIVACAINE HYDROCHLORIDE 2 MG/ML
0.5 INJECTION, SOLUTION EPIDURAL; INFILTRATION; PERINEURAL
Status: COMPLETED | OUTPATIENT
Start: 2024-07-19 | End: 2024-07-19

## 2024-07-19 RX ADMIN — ROPIVACAINE HYDROCHLORIDE 0.5 ML: 2 INJECTION, SOLUTION EPIDURAL; INFILTRATION; PERINEURAL at 11:30

## 2024-07-19 RX ADMIN — BETAMETHASONE SODIUM PHOSPHATE AND BETAMETHASONE ACETATE 3 MG: 3; 3 INJECTION, SUSPENSION INTRA-ARTICULAR; INTRALESIONAL; INTRAMUSCULAR; SOFT TISSUE at 11:30

## 2024-07-19 NOTE — PROGRESS NOTES
The HAND & UPPER EXTREMITY OFFICE VISIT   Referred By:  No referring provider defined for this encounter.      Chief Complaint:     Bilateral hand pain and numbness      History of Present Illness:   58 y.o., Right hand dominant male presents with bilateral hand pain and numbness which has gradually progressed over the past several years. He reports pain in the radial wrists which is worse with gripping, pinching, and holding a golf club. He has previously tried taking Aleve for the pain without significant relief. He also notes numbness in the bilateral fingertips which he notes when attempting to turn a screw. He denies dropping objects due to the numbness.       ADLs: Community ambulator  Smoke: former ETOH: socially   Drugs:  denies Job: employed, [employer]       Past Medical History:  Past Medical History:   Diagnosis Date    Cardiac disease     Diabetes mellitus (HCC)     Hyperlipidemia     Hypertension     MI (myocardial infarction) (HCC)      Past Surgical History:   Procedure Laterality Date    CARDIAC CATHETERIZATION N/A 3/6/2023    Procedure: Cardiac catheterization;  Surgeon: Fernando Panda MD;  Location: [facility];  Service: Cardiology    CARDIAC CATHETERIZATION N/A 3/6/2023    Procedure: Cardiac pci;  Surgeon: Fernando Panda MD;  [facility];  Service: Cardiology    CARDIAC CATHETERIZATION N/A 3/6/2023    Procedure: Cardiac Coronary Angiogram;  Surgeon: Fernando Panda MD;  Location[facility];  Service: Cardiology    CARDIAC CATHETERIZATION Left 3/18/2024    Procedure: Cardiac catheterization;  Surgeon: Bob Fraser MD;  [facility];  Service: Cardiology    CARDIAC CATHETERIZATION N/A 3/18/2024    Procedure: Cardiac pci;  Surgeon: Bob Fraser MD;  [facility];  Service: Cardiology    CARDIAC SURGERY      CORONARY ANGIOPLASTY WITH STENT PLACEMENT      DEBRIDEMENT TENNIS ELBOW      HERNIA REPAIR      KNEE ARTHROSCOPY       SHOULDER ARTHROSCOPY      VASECTOMY       Family History   Problem Relation Age of Onset    Hypertension Maternal Grandfather     Diabetes type I Maternal Grandmother     Hypertension Paternal Grandfather     Diabetes type II Paternal Grandmother      Social History     Socioeconomic History    Marital status: /Civil Union     Spouse name: Not on file    Number of children: Not on file    Years of education: Not on file    Highest education level: Not on file   Occupational History    Not on file   Tobacco Use    Smoking status: Former     Current packs/day: 0.00     Average packs/day: 0.5 packs/day for 28.0 years (14.0 ttl pk-yrs)     Types: Cigarettes     Start date: 1977     Quit date: 2005     Years since quittin.5    Smokeless tobacco: Former     Types: Chew     Quit date: 1985   Vaping Use    Vaping status: Never Used   Substance and Sexual Activity    Alcohol use: Yes     Comment: socially    Drug use: No    Sexual activity: Yes     Birth control/protection: Post-menopausal   Other Topics Concern    Not on file   Social History Narrative    Not on file     Social Determinants of Health     Financial Resource Strain: Not on file   Food Insecurity: Not on file   Transportation Needs: Not on file   Physical Activity: Not on file   Stress: Not on file   Social Connections: Not on file   Intimate Partner Violence: Not on file   Housing Stability: Not on file     Scheduled Meds:  Continuous Infusions:No current facility-administered medications for this visit.    PRN Meds:.  Allergies   Allergen Reactions    Banana - Food Allergy Throat Swelling    Tetanus Toxoid Other (See Comments)           Physical Examination:    /70   Ht 6' (1.829 m)   Wt 92.5 kg (204 lb)   BMI 27.67 kg/m²     Gen: A&Ox3, NAD  Cardiac: regular rate  Chest: non labored breathing  Abdomen: Non-distended      Right Upper Extremity:  Skin CDI  No obvious deformity of the shoulder, arm, elbow, forearm, wrist,  hand  Swelling Negative  TTP thumb CMC, thenar muscles  +pressure shear  Non-tender first dorsal compartment, neg finkelsteins  Sensation intact to light touch in the axillary median, ulnar, and radial nerve distributions  Full composite fist  2+RP    Positive Tinel's at the carpal tunnel  Positive Durkan's  Negative Tinel's at the cubital tunnel   Negative elbow flexion compression test       Left Upper Extremity:  Skin CDI  No obvious deformity of the shoulder, arm, elbow, forearm, wrist, hand  Swelling Negative  TTP thumb CMC  +pressure shear  Non tender 1st dorsal, neg finkelsteins  Sensation intact to light touch in the axillary median, ulnar, and radial nerve distributions  Composite fist  2+RP    Positive Tinel's at the carpal tunnel  Positive Durkan's  Negative Tinel's at the cubital tunnel   Negative elbow flexion compression test      Studies:  Radiographs: I personally reviewed and independently interpreted the available radiographs.  7/19/24: Radiographs of the bilateral hands, multiple views, demonstrate degenerative changes at the bilateral thumb CMC joints with radial subluxation of the metacarpals on the trapeziums and osteophyte formation, loss of joint space. No significant degenerative changes in the radiocarpal joint.       Assessment and Plan:  1. Bilateral hand pain  XR hand 3+ vw right    XR hand 3+ vw left    Small joint arthrocentesis: bilateral thumb CMC    Thumb Cude comf/Cool      2. Numbness and tingling in both hands  US MSK limited    US MSK limited          58 y.o. male presents with signs and symptoms consistent with the above diagnosis.  We discussed the natural history of this condition and its pathogenesis.  We discussed operative and nonoperative treatment options including bracing, CSI, or surgery if his symptoms fail to improve. Bilateral comfort cool braces provided in the office today to be worn during activities. He would also like try bilateral CMC CSI today. Risks,  benefits and alternative treatments were discussed with the patient. These risks of injection include but are not limited to: bleeding, infection, allergic reaction to agents, possible increase in pain, and/or elevation in blood sugar. Patient understands and would like to proceed with the proposed procedure. Patient tolerated the procedure well without any complications. See procedure note for details. he may take NSAIDs/Tylenol or apply ice to the area as needed for post-injection discomfort.     For his bilateral hand numbness, MSK US ordered to evaluate for carpal and cubital tunnel syndrome. We will see him back in the office following the US to review his results and discuss further management options.     he expressed understanding of the plan and agreed. We encouraged them to contact our office with any questions or concerns.         Papo Castillo MD  Hand and Upper Extremity Surgery        *This note was dictated using Dragon voice recognition software. Please excuse any word substitutions or errors.*    Small joint arthrocentesis: bilateral thumb CMC  Universal Protocol:  Consent: Verbal consent obtained.  Risks and benefits: risks, benefits and alternatives were discussed  Consent given by: patient  Patient understanding: patient states understanding of the procedure being performed  Site marked: the operative site was marked  Radiology Images displayed and confirmed. If images not available, report reviewed: imaging studies available  Required items: required blood products, implants, devices, and special equipment available  Patient identity confirmed: verbally with patient  Supporting Documentation  Indications: pain   Procedure Details  Location: thumb - bilateral thumb CMC  Preparation: Patient was prepped and draped in the usual sterile fashion  Needle size: 25 G  Ultrasound guidance: no  Approach: dorsal    Medications (Right): 0.5 mL ropivacaine 0.2 %; 3 mg betamethasone acetate-betamethasone  sodium phosphate 6 (3-3) mg/mLMedications (Left): 0.5 mL ropivacaine 0.2 %; 3 mg betamethasone acetate-betamethasone sodium phosphate 6 (3-3) mg/mL   Patient tolerance: patient tolerated the procedure well with no immediate complications  Dressing:  Sterile dressing applied

## 2024-08-07 ENCOUNTER — TELEPHONE (OUTPATIENT)
Age: 59
End: 2024-08-07

## 2024-08-07 NOTE — TELEPHONE ENCOUNTER
Caller: One call- W/C    Doctor: Jonathan    Reason for call: Asked what was ordered, information was given

## 2024-08-19 ENCOUNTER — HOSPITAL ENCOUNTER (OUTPATIENT)
Dept: ULTRASOUND IMAGING | Facility: HOSPITAL | Age: 59
Discharge: HOME | End: 2024-08-19
Payer: OTHER MISCELLANEOUS

## 2024-08-19 DIAGNOSIS — R20.2 NUMBNESS AND TINGLING IN BOTH HANDS: ICD-10-CM

## 2024-08-19 DIAGNOSIS — R20.0 NUMBNESS AND TINGLING IN BOTH HANDS: ICD-10-CM

## 2024-08-19 PROCEDURE — 76882 US LMTD JT/FCL EVL NVASC XTR: CPT

## 2024-08-20 NOTE — PROGRESS NOTES
Cardiology Follow Up    Fidel Calderon  1965  [id number]  [facility]  [address]  [place] [address] [phone]    1. S/P drug eluting coronary stent placement        2. Primary hypertension        3. Mixed hyperlipidemia  Lipid panel      4. Type 2 diabetes mellitus with other specified complication, without long-term current use of insulin (HCC)            Interval History:   Mr Fidel Calderon presented to [facility]'s ED on 3/02/24 with chest pain.  I am presented with a 3-week history of chest pain.  Metoprolol was stopped by Cardiology to evaluate if this would improve his exercise tolerance.  Stopping Metoprolol did not improve his symptoms.  Metoprolol restarted.  Amlodipine 2.5mg daily started.  He was instructed to follow up with Cardiology.      On 3/05/24 Fidel was seen in our office for a recent ED follow up visit.  He is experiencing severe CP with vigorous exertion.  His SBP elevating into the 200's after a short time with vigorous exercise.  Fidel admits to left sided persistent chest discomfort sitting in the office today, 1-2/10 relieved with sitting forward.  Brain denies chest discomfort with walking on a flat surface.  ESR and CRP WNL     On 3/18/24 Fidel underwent a cardiac catheterization which showed distal RCA 99% stenosis, first RPL 80% stenosis, status post PCI and Skypoint LACEY  placed across the 99% stenosis in the distal RCA, 0% residual stenosis.  Status post PCI with Xience Skypoint  drug-eluting stent placed across the first RPL stenosis, 0% residual stenosis.     Fidel presents to our office for a recent a follow up visit.  He denies me with minimal or moderate exertion chest pain palpitation lightheadedness or dizziness.  He biked was in July.  He is experiencing upper respiratory infection at this time with nasal congestion denies cough fever or chills.        Medical History   Primary  Cardiologist Dr Farzad MEDRANO  CAD   hx of % lesion in the first right posterolateral segment improved on a 5% residual stenosis.  This post Xience Alpine Rx drug-eluting stent placed across the percent stenosis in the second right posterior lateral segment, 0% residual stenosis  Hx of PCI with LACEY to 80% stenosis of mid LAD 3/06/2023  Hypertension  Hyperlipidemia 23 , TG 97, HDL 27, LDL 60   DM2 HgbA1C  6.7 on 3/16/24            Patient Active Problem List   Diagnosis    CAD (coronary artery disease)    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, greater than 8 weeks    Primary hypertension    Mixed hyperlipidemia    Status post insertion of drug eluting coronary artery stent    Type 2 diabetes mellitus with circulatory disorder, without long-term current use of insulin ([facility])    Microalbuminuria    Fatigue due to excessive exertion    Other chest pain    Bilateral hand pain     Past Medical History:   Diagnosis Date    Cardiac disease     Diabetes mellitus ([facility])     Hyperlipidemia     Hypertension     MI (myocardial infarction) ([facility])      Social History     Socioeconomic History    Marital status: /Civil Union     Spouse name: Not on file    Number of children: Not on file    Years of education: Not on file    Highest education level: Not on file   Occupational History    Not on file   Tobacco Use    Smoking status: Former     Current packs/day: 0.00     Average packs/day: 0.5 packs/day for 28.0 years (14.0 ttl pk-yrs)     Types: Cigarettes     Start date: 1977     Quit date: 2005     Years since quittin.6    Smokeless tobacco: Former     Types: Chew     Quit date: 1985   Vaping Use    Vaping status: Never Used   Substance and Sexual Activity    Alcohol use: Yes     Comment: socially    Drug use: No    Sexual activity: Yes     Birth control/protection: Post-menopausal   Other Topics Concern    Not on file   Social History Narrative    Not on file     Social  Determinants of Health     Financial Resource Strain: Not on file   Food Insecurity: Not on file   Transportation Needs: Not on file   Physical Activity: Not on file   Stress: Not on file   Social Connections: Not on file   Intimate Partner Violence: Not on file   Housing Stability: Not on file      Family History   Problem Relation Age of Onset    Hypertension Maternal Grandfather     Diabetes type I Maternal Grandmother     Hypertension Paternal Grandfather     Diabetes type II Paternal Grandmother      Past Surgical History:   Procedure Laterality Date    CARDIAC CATHETERIZATION N/A 3/6/2023    Procedure: Cardiac catheterization;  Surgeon: Feranndo Panda MD;  [facility]: [facility];  Service: Cardiology    CARDIAC CATHETERIZATION N/A 3/6/2023    Procedure: Cardiac pci;  Surgeon: Fernando Panda MD;  [facility];  Service: Cardiology    CARDIAC CATHETERIZATION N/A 3/6/2023    Procedure: Cardiac Coronary Angiogram;  Surgeon: Fernando Panda MD;  [facility];  Service: Cardiology    CARDIAC CATHETERIZATION Left 3/18/2024    Procedure: Cardiac catheterization;  Surgeon: Bob Fraser MD;  [facility];  Service: Cardiology    CARDIAC CATHETERIZATION N/A 3/18/2024    Procedure: Cardiac pci;  Surgeon: Bob Fraser MD;  [facility];  Service: Cardiology    CARDIAC SURGERY      CORONARY ANGIOPLASTY WITH STENT PLACEMENT      DEBRIDEMENT TENNIS ELBOW      HERNIA REPAIR      KNEE ARTHROSCOPY      SHOULDER ARTHROSCOPY      VASECTOMY         Current Outpatient Medications:     amLODIPine (NORVASC) 5 mg tablet, Take 1 tablet (5 mg total) by mouth daily, Disp: 90 tablet, Rfl: 3    aspirin (ECOTRIN LOW STRENGTH) 81 mg EC tablet, Take 1 tablet by mouth daily, Disp: , Rfl:     Brilinta 90 MG, TAKE 1 TABLET TWICE A DAY, Disp: 180 tablet, Rfl: 3    Cholecalciferol (VITAMIN D) 2000 units CAPS, Take by mouth, Disp: , Rfl:     Empagliflozin (Jardiance)  25 MG TABS, Take 1 tablet (25 mg total) by mouth every morning, Disp: 90 tablet, Rfl: 1    irbesartan (AVAPRO) 75 mg tablet, TAKE 1 TABLET DAILY, Disp: 90 tablet, Rfl: 1    metFORMIN (GLUCOPHAGE) 1000 MG tablet, Take 1,000 mg by mouth daily with breakfast, Disp: , Rfl:     metoprolol succinate (TOPROL-XL) 25 mg 24 hr tablet, Take 1 tablet (25 mg total) by mouth daily, Disp: 100 tablet, Rfl: 1    nitroglycerin (NITROSTAT) 0.4 mg SL tablet, Place 1 tablet (0.4 mg total) under the tongue every 5 (five) minutes as needed for chest pain (Patient not taking: Reported on 4/15/2024), Disp: 90 tablet, Rfl: 3    rosuvastatin (CRESTOR) 40 MG tablet, TAKE 1 TABLET DAILY, Disp: 90 tablet, Rfl: 1    semaglutide, 1 mg/dose, (Ozempic, 1 MG/DOSE,) 4 mg/3 mL injection pen, Inject 0.75 mL (1 mg total) under the skin every 7 days, Disp: 9 mL, Rfl: 1    tadalafil (CIALIS) 10 MG tablet, Take 10 mg by mouth as needed (Patient not taking: Reported on 7/19/2024), Disp: , Rfl:     ticagrelor (BRILINTA) 90 MG, Take 1 tablet (90 mg total) by mouth 2 (two) times a day, Disp: 180 tablet, Rfl: 0    ticagrelor (Brilinta) 90 MG, Take 1 tablet (90 mg total) by mouth 2 (two) times a day, Disp: 20 tablet, Rfl: 0    zolpidem (AMBIEN) 10 mg tablet, one tab at bedtime as needed, Disp: , Rfl:   Allergies   Allergen Reactions    Banana - Food Allergy Throat Swelling    Tetanus Toxoid Other (See Comments)       Labs:  No visits with results within 2 Month(s) from this visit.   Latest known visit with results is:   Admission on 03/18/2024, Discharged on 03/18/2024   Component Date Value    Ventricular Rate 03/18/2024 67     Atrial Rate 03/18/2024 67     MN Interval 03/18/2024 232     QRSD Interval 03/18/2024 106     QT Interval 03/18/2024 400     QTC Interval 03/18/2024 422     P Axis 03/18/2024 39     QRS Axis 03/18/2024 57     T Wave Axis 03/18/2024 4     Activated Clotting Time,* 03/18/2024 277 (H)     Specimen Type 03/18/2024 VENOUS      Imaging: No  results found.    Review of Systems:  Review of Systems   Constitutional:  Positive for fatigue.   HENT:  Positive for sinus pressure.        Physical Exam:  Physical Exam  Vitals reviewed.   Constitutional:       Appearance: Normal appearance.   Neck:      Vascular: No carotid bruit.   Cardiovascular:      Rate and Rhythm: Normal rate and regular rhythm.      Pulses: Normal pulses.      Heart sounds: Normal heart sounds.   Pulmonary:      Effort: Pulmonary effort is normal.      Breath sounds: Normal breath sounds.   Musculoskeletal:         General: Normal range of motion.      Cervical back: Normal range of motion and neck supple.      Right lower leg: No edema.      Left lower leg: No edema.   Skin:     General: Skin is warm and dry.      Capillary Refill: Capillary refill takes less than 2 seconds.   Neurological:      General: No focal deficit present.      Mental Status: He is alert and oriented to person, place, and time.   Psychiatric:         Mood and Affect: Mood normal.         Behavior: Behavior normal.         Discussion/Summary:  # 3/18/24 sp PCI and Skypoint LACEY  placed across the 99% stenosis in the distal RCA, 0% residual stenosis.  Status post PCI with Xience Skypoint  drug-eluting stent placed across the first RPL stenosis, 0% residual stenosis.  Continue on aspirin 81 mg daily, Brilinta 90 mg twice daily, Crestor 40 mg daily, metoprolol succinate 25 mg daily, Avapro 75 mg daily, heart healthy diet, I encourage resuming daily cardiovascular exercise  # Hypertension LUE sitting 112/70 continue on amlodipine 5 mg daily, metoprolol succinate 25 mg daily, Avapro 75 mg daily, DASH diet  # Hyperlipidemia 12/02/23 , TG 97, HDL 27, LDL 60 continue on Crestor 40 mg daily, heart healthy diet.  Fasting lipid profile in December  # DM2 HgbA1C  6.4 on 5/11/24 continue on Jardiance 25 mg daily, metformin 1000 mg daily Ozempic 1 mg every 7 days

## 2024-08-21 ENCOUNTER — OFFICE VISIT (OUTPATIENT)
Dept: CARDIOLOGY CLINIC | Facility: CLINIC | Age: 59
End: 2024-08-21
Payer: COMMERCIAL

## 2024-08-21 VITALS
DIASTOLIC BLOOD PRESSURE: 70 MMHG | HEART RATE: 84 BPM | SYSTOLIC BLOOD PRESSURE: 120 MMHG | HEIGHT: 72 IN | OXYGEN SATURATION: 98 % | WEIGHT: 203.5 LBS | BODY MASS INDEX: 27.56 KG/M2

## 2024-08-21 DIAGNOSIS — E11.69 TYPE 2 DIABETES MELLITUS WITH OTHER SPECIFIED COMPLICATION, WITHOUT LONG-TERM CURRENT USE OF INSULIN (HCC): ICD-10-CM

## 2024-08-21 DIAGNOSIS — I10 PRIMARY HYPERTENSION: ICD-10-CM

## 2024-08-21 DIAGNOSIS — E78.2 MIXED HYPERLIPIDEMIA: ICD-10-CM

## 2024-08-21 DIAGNOSIS — Z95.5 S/P DRUG ELUTING CORONARY STENT PLACEMENT: Primary | ICD-10-CM

## 2024-08-21 PROCEDURE — 99215 OFFICE O/P EST HI 40 MIN: CPT | Performed by: NURSE PRACTITIONER

## 2024-08-22 ENCOUNTER — TELEPHONE (OUTPATIENT)
Age: 59
End: 2024-08-22

## 2024-08-22 NOTE — TELEPHONE ENCOUNTER
Patient got letter from insurance that ozempic 1mg needs a prior auth. Website provided is Recommend/pa

## 2024-08-23 NOTE — TELEPHONE ENCOUNTER
PA for Ozempic (1 MG/DOSE) 4MG/3ML pen-injectors not required     Reason:     CMM Key: [id number]        Patient advised by          [x] MyChart Message  [] Phone call   []LMOM  []L/M to call office as no active Communication consent on file  []Unable to leave detailed message as VM not approved on Communication consent

## 2024-08-30 ENCOUNTER — OFFICE VISIT (OUTPATIENT)
Dept: OBGYN CLINIC | Facility: CLINIC | Age: 59
End: 2024-08-30
Payer: OTHER MISCELLANEOUS

## 2024-08-30 VITALS
WEIGHT: 203 LBS | HEIGHT: 72 IN | DIASTOLIC BLOOD PRESSURE: 68 MMHG | SYSTOLIC BLOOD PRESSURE: 130 MMHG | BODY MASS INDEX: 27.5 KG/M2

## 2024-08-30 DIAGNOSIS — G56.23 CUBITAL TUNNEL SYNDROME, BILATERAL: ICD-10-CM

## 2024-08-30 DIAGNOSIS — M18.0 ARTHRITIS OF CARPOMETACARPAL (CMC) JOINT OF BOTH THUMBS: Primary | ICD-10-CM

## 2024-08-30 DIAGNOSIS — G56.03 CARPAL TUNNEL SYNDROME, BILATERAL: ICD-10-CM

## 2024-08-30 PROCEDURE — 99214 OFFICE O/P EST MOD 30 MIN: CPT | Performed by: ORTHOPAEDIC SURGERY

## 2024-08-30 RX ORDER — ACETAMINOPHEN 325 MG/1
975 TABLET ORAL ONCE
OUTPATIENT
Start: 2024-08-30 | End: 2024-08-30

## 2024-08-30 NOTE — PROGRESS NOTES
HAND & UPPER EXTREMITY OFFICE VISIT   Referred By:  No referring provider defined for this encounter.      Chief Complaint:     Bilateral hand pain and numbness    Previous History:   Previously seen on 7/19. At that point CSI was performed for bilateral thumb CMC joints, and MSK US was ordered to evaluate for bilateral carpal and cubital tunnel syndrome.     Interval History:  Since the last visit he reports continued pain in the thumbs, as well as numbness. He denies any noticeable relief from the CSI. The right thumb continues to be more bothersome than the left, and is his biggest concern at this point.     Past Medical History:  Past Medical History:   Diagnosis Date    Cardiac disease     Diabetes mellitus (HCC)     Hyperlipidemia     Hypertension     MI (myocardial infarction) (HCC)      Past Surgical History:   Procedure Laterality Date    CARDIAC CATHETERIZATION N/A 3/6/2023    Procedure: Cardiac catheterization;  Surgeon: Fernando Panda MD;  [facility];  Service: Cardiology    CARDIAC CATHETERIZATION N/A 3/6/2023    Procedure: Cardiac pci;  Surgeon: Fernando Panda MD;  [facility];  Service: Cardiology    CARDIAC CATHETERIZATION N/A 3/6/2023    Procedure: Cardiac Coronary Angiogram;  Surgeon: Fernando Panda MD;  [facility];  Service: Cardiology    CARDIAC CATHETERIZATION Left 3/18/2024    Procedure: Cardiac catheterization;  Surgeon: Bob Fraser MD;  [facility]: [facility];  Service: Cardiology    CARDIAC CATHETERIZATION N/A 3/18/2024    Procedure: Cardiac pci;  Surgeon: Bob Fraser MD;  [facility];  Service: Cardiology    CARDIAC SURGERY      CORONARY ANGIOPLASTY WITH STENT PLACEMENT      DEBRIDEMENT TENNIS ELBOW      HERNIA REPAIR      KNEE ARTHROSCOPY      SHOULDER ARTHROSCOPY      VASECTOMY       Family History   Problem Relation Age of Onset    Hypertension Maternal Grandfather     Diabetes type I Maternal  Grandmother     Hypertension Paternal Grandfather     Diabetes type II Paternal Grandmother      Social History     Socioeconomic History    Marital status: /Civil Union     Spouse name: Not on file    Number of children: Not on file    Years of education: Not on file    Highest education level: Not on file   Occupational History    Not on file   Tobacco Use    Smoking status: Former     Current packs/day: 0.00     Average packs/day: 0.5 packs/day for 28.0 years (14.0 ttl pk-yrs)     Types: Cigarettes     Start date: 1977     Quit date: 2005     Years since quittin.6    Smokeless tobacco: Former     Types: Chew     Quit date: 1985   Vaping Use    Vaping status: Never Used   Substance and Sexual Activity    Alcohol use: Yes     Comment: socially    Drug use: No    Sexual activity: Yes     Birth control/protection: Post-menopausal   Other Topics Concern    Not on file   Social History Narrative    Not on file     Social Determinants of Health     Financial Resource Strain: Not on file   Food Insecurity: Not on file   Transportation Needs: Not on file   Physical Activity: Not on file   Stress: Not on file   Social Connections: Not on file   Intimate Partner Violence: Not on file   Housing Stability: Not on file     Scheduled Meds:  Continuous Infusions:No current facility-administered medications for this visit.    PRN Meds:.  Allergies   Allergen Reactions    Banana - Food Allergy Throat Swelling    Tetanus Toxoid Other (See Comments)       Physical Examination:    /68   Ht 6' (1.829 m)   Wt 92.1 kg (203 lb)   BMI 27.53 kg/m²     Gen: A&Ox3, NAD  Cardiac: regular rate  Chest: non labored breathing  Abdomen: Non-distended    Right Upper Extremity:  Skin CDI  No obvious deformity of the shoulder, arm, elbow, forearm, wrist, hand  Swelling Negative  TTP thumb CMC, thenar muscles  +pressure shear  Non-tender first dorsal compartment, neg finkelsteins  Sensation intact to light touch in the  axillary median, ulnar, and radial nerve distributions  Full composite fist  2+RP  Positive Tinel's at the carpal tunnel  Positive Durkan's  Negative Tinel's at the cubital tunnel   Negative elbow flexion compression test         Left Upper Extremity:  Skin CDI  No obvious deformity of the shoulder, arm, elbow, forearm, wrist, hand  Swelling Negative  TTP thumb CMC  +pressure shear  Non tender 1st dorsal, neg finkelsteins  Sensation intact to light touch in the axillary median, ulnar, and radial nerve distributions  Composite fist  2+RP  Positive Tinel's at the carpal tunnel  Positive Durkan's  Negative Tinel's at the cubital tunnel   Negative elbow flexion compression test      Studies:  8/19/24: MSK US of the bilateral wrists and elbows shows evidence of carpal and cubital tunnel syndrome.     RIGHT SIDE: Median nerve cross sectional area distal forearm (CSAp): 7.6 sq mm. Maximum median nerve cross sectional area within carpal tunnel (CSAc): 13.6 sq mm. Delta CSA (CSAc-CSAp): 6 sq mm. Wrist to Forearm ratio (WFR ratio) (CSAc/CSAp): 1.8. No hypervascularity.    Ulnar nerve cross sectional area proximally: 6.4 sq mm. Maximum ulnar nerve cross sectional area within or near the cubital tunnel: 14 sq mm. Ulnar nerve cross sectional area distal to cubital tunnel: 4.5 sq mm. Maximal ulnar nerve/proximal cross sectional ratio: 2.2. No evidence of ulnar nerve dislocation from the cubital tunnel on dynamic flexion-extension evaluation. No accessory anconeus epitrochlearis muscle.       LEFT SIDE: Median nerve cross sectional area distal forearm (CSAp): 6.5 sq mm. Maximum median nerve cross sectional area within carpal tunnel (CSAc): 12.4 sq mm. Delta CSA (CSAc-CSAp): 5.9 sq mm. Wrist to Forearm ratio (WFR ratio) (CSAc/CSAp): 1.9. No hypervascularity. There is a bifid median nerve noted within the proximal carpal tunnel.     Ulnar nerve cross sectional area proximally: 8 sq mm. Maximum ulnar nerve cross sectional area within  or near the cubital tunnel: 17.2 sq mm. Ulnar nerve cross sectional area distal to cubital tunnel: 4.9 sq mm. Maximal ulnar nerve/proximal cross sectional ratio: 2.2. No evidence of ulnar nerve dislocation from the cubital tunnel on dynamic flexion-extension evaluation. No accessory anconeus epitrochlearis muscle.      Assessment and Plan:  1. Arthritis of carpometacarpal (CMC) joint of both thumbs        2. Carpal tunnel syndrome, bilateral  Case request operating room: RELEASE CARPAL TUNNEL - LEFT, RELEASE CUBITAL TUNNEL - LEFT, WITH POSSIBLE ANTERIOR TRANSPOSITION OF THE ULNAR NERVE    Case request operating room: RELEASE CARPAL TUNNEL - LEFT, RELEASE CUBITAL TUNNEL - LEFT, WITH POSSIBLE ANTERIOR TRANSPOSITION OF THE ULNAR NERVE      3. Cubital tunnel syndrome, bilateral  Case request operating room: RELEASE CARPAL TUNNEL - LEFT, RELEASE CUBITAL TUNNEL - LEFT, WITH POSSIBLE ANTERIOR TRANSPOSITION OF THE ULNAR NERVE    Case request operating room: RELEASE CARPAL TUNNEL - LEFT, RELEASE CUBITAL TUNNEL - LEFT, WITH POSSIBLE ANTERIOR TRANSPOSITION OF THE ULNAR NERVE          58 y.o. male presents in follow up for the above diagnosis. US results reviewed and discussed with the patient. We reviewed treatment options including nighttime bracing, CSI, or surgery. We discussed the details of the surgery and anticipated recovery period.   He reports that his main concern is the pain in his right thumb. He is planning on retiring in 1.5 years, and would like to discuss additional treatment options. Unfortunately, he did not get significant relief from CSI. He has been wearing the Comfort Cool brace while working which does give him some additional support. We reviewed additional treatment options including repeat CSI, continued bracing and activity modification, pain control with oral/topical NSAIDs, and surgery including CMC arthroplasty vs denervation. We reviewed the risks vs benefits, details of both procedures, and  anticipated recovery periods.   After reviewing all of his treatment options, he would like to proceed with scheduling the left carpal and cubital tunnel release this fall, and will consider surgery for the right side in the beginning of the new year.   We reviewed the risks of surgery including infection, bleeding, injury to nearby structures including the nerve, poor wound healing, pillar pain, stiffness, CRPS, and incomplete resolution or recurrence of symptoms. We reviewed the details of the procedure and the anticipated recovery period. All questions answered to patient's satisfaction. Written consent obtained in the office today.     he expressed understanding of the plan and agreed. We encouraged them to contact our office with any questions or concerns.     Left carpal tunnel and left cubital tunnel release with possible anterior transposition  Regional with sedation      Papo Castillo MD  Hand and Upper Extremity Surgery      *This note was dictated using Dragon voice recognition software. Please excuse any word substitutions or errors.*

## 2024-10-01 ENCOUNTER — TELEPHONE (OUTPATIENT)
Dept: OBGYN CLINIC | Facility: HOSPITAL | Age: 59
End: 2024-10-01

## 2024-10-01 NOTE — TELEPHONE ENCOUNTER
Caller: Domingo ([facility])     # [phone]    Doctor: Jonathan    Reason for call: Domnigo called to check on last apt date. Also provided office FAX number for agent.    Call back#: [phone]

## 2024-10-07 ENCOUNTER — ANESTHESIA EVENT (OUTPATIENT)
Age: 59
End: 2024-10-07
Payer: OTHER MISCELLANEOUS

## 2024-10-14 ENCOUNTER — NURSE TRIAGE (OUTPATIENT)
Age: 59
End: 2024-10-14

## 2024-10-14 DIAGNOSIS — E78.00 HYPERCHOLESTEROLEMIA: ICD-10-CM

## 2024-10-14 DIAGNOSIS — E11.69 TYPE 2 DIABETES MELLITUS WITH OTHER SPECIFIED COMPLICATION, WITHOUT LONG-TERM CURRENT USE OF INSULIN (HCC): ICD-10-CM

## 2024-10-14 DIAGNOSIS — I10 PRIMARY HYPERTENSION: ICD-10-CM

## 2024-10-14 DIAGNOSIS — Z95.5 S/P DRUG ELUTING CORONARY STENT PLACEMENT: ICD-10-CM

## 2024-10-14 DIAGNOSIS — I25.10 CORONARY ARTERY DISEASE INVOLVING NATIVE CORONARY ARTERY OF NATIVE HEART WITHOUT ANGINA PECTORIS: ICD-10-CM

## 2024-10-14 NOTE — TELEPHONE ENCOUNTER
"Spoke with patient, has left carpal tunnel repair surgery Monday October 21st, inquiring if he needs to hold Aspirin 81mg & Brilinta 90mg and for how long?    Dr Castillo surgeon.    Please advise.    Fidel can be reached at [phone]        Reason for Disposition   Caller has NON-URGENT medicine question about med that PCP or specialist prescribed and triager unable to answer question     Aspirin and Brilinta hold?    Answer Assessment - Initial Assessment Questions  1. NAME of MEDICATION: \"What medicine are you calling about?\"      Aspirin 81mg, Brilinta 90mg  2. QUESTION: \"What is your question?\" (e.g., medication refill, side effect)      Do these need to be held prior to carpal tunnel surgery? How long?  3. PRESCRIBING HCP: \"Who prescribed it?\" Reason: if prescribed by specialist, call should be referred to that group.      Pam BARILLAS    Protocols used: Medication Question Call-ADULT-[place]    "

## 2024-10-14 NOTE — PRE-PROCEDURE INSTRUCTIONS
Pre-Surgery Instructions:   Medication Instructions    amLODIPine (NORVASC) 5 mg tablet Take day of surgery.    aspirin (ECOTRIN LOW STRENGTH) 81 mg EC tablet Msg'd pool for instructions    Brilinta 90 MG Msg'd pool for instructions    Cholecalciferol (VITAMIN D) 2000 units CAPS Stop taking 7 days prior to surgery.    Empagliflozin (Jardiance) 25 MG TABS Stop taking 4 days prior to surgery.    metFORMIN (GLUCOPHAGE) 1000 MG tablet Hold day of surgery.    metoprolol succinate (TOPROL-XL) 25 mg 24 hr tablet Take day of surgery.    rosuvastatin (CRESTOR) 40 MG tablet Take night before surgery    semaglutide, 1 mg/dose, (Ozempic, 1 MG/DOSE,) 4 mg/3 mL injection pen Stop taking 7 days prior to surgery.    tadalafil (CIALIS) 10 MG tablet Hold day of surgery.      Medication instructions for day surgery reviewed. Please use only a sip of water to take your instructed medications. Avoid all over the counter vitamins, supplements and NSAIDS for one week prior to surgery per anesthesia guidelines. Tylenol is ok to take as needed.     You will receive a call one business day prior to surgery with an arrival time and hospital directions. If your surgery is scheduled on a Monday, the hospital will be calling you on the Friday prior to your surgery. If you have not heard from anyone by 8pm, please call the hospital supervisor through the hospital  at [phone]. ([place] [phone] or [place] [phone]).    Do not eat or drink anything after midnight the night before your surgery, including candy, mints, lifesavers, or chewing gum. Do not drink alcohol 24hrs before your surgery. Try not to smoke at least 24hrs before your surgery.       Follow the pre surgery showering instructions as listed in the “My Surgical Experience Booklet” or otherwise provided by your surgeon's office. Do not use a blade to shave the surgical area 1 week before surgery. It is okay to use a clean electric clippers up to 24 hours before  surgery. Do not apply any lotions, creams, including makeup, cologne, deodorant, or perfumes after showering on the day of your surgery. Do not use dry shampoo, hair spray, hair gel, or any type of hair products.     No contact lenses, eye make-up, or artificial eyelashes. Remove nail polish, including gel polish, and any artificial, gel, or acrylic nails if possible. Remove all jewelry including rings and body piercing jewelry.     Wear causal clothing that is easy to take on and off. Consider your type of surgery.    Keep any valuables, jewelry, piercings at home. Please bring any specially ordered equipment (sling, braces) if indicated.    Arrange for a responsible person to drive you to and from the hospital on the day of your surgery. Please confirm the visitor policy for the day of your procedure when you receive your phone call with an arrival time.     Call the surgeon's office with any new illnesses, exposures, or additional questions prior to surgery.    Please reference your “My Surgical Experience Booklet” for additional information to prepare for your upcoming surgery.

## 2024-10-14 NOTE — TELEPHONE ENCOUNTER
He cannot stop ASA or Brilinta from my standpoint.  You can message Dr Panda his primary Cardiologist.  Fidel underwent LACEY in March 2024.

## 2024-10-16 RX ORDER — IRBESARTAN 75 MG/1
75 TABLET ORAL DAILY
Qty: 90 TABLET | Refills: 1 | Status: SHIPPED | OUTPATIENT
Start: 2024-10-16

## 2024-10-16 RX ORDER — METOPROLOL SUCCINATE 25 MG/1
25 TABLET, EXTENDED RELEASE ORAL DAILY
Qty: 100 TABLET | Refills: 0 | OUTPATIENT
Start: 2024-10-16 | End: 2025-05-04

## 2024-10-16 NOTE — TELEPHONE ENCOUNTER
Refill must be reviewed and completed by the office or provider. The refill is unable to be approved or denied by the medication management team.    Brilinta -  This refill cannot be delegated

## 2024-10-18 NOTE — DISCHARGE INSTR - AVS FIRST PAGE
POST-OPERATIVE INSTRUCTIONS  CARPAL AND CUBITAL TUNNEL RELEASE    You have just undergone a carpal and cubital tunnel release by Dr. Castillo in the operating room.  It is our wish that your postoperative recovery be as quick and comfortable as possible.  Please carefully review the following items that are important in your recovery.    Pain Control:  After any operation, a certain degree of pain is to be expected.  A prescription for narcotic pain medication as well as acetaminophen and/or ibuprofen has been electronically sent to your pharmacy. Do not exceed 3000 mg of Tylenol per day. This medication will relieve most of your pain but may not relieve all of the pain. Some pain is normal post operatively.     When you go home, please keep your operated arm elevated at all times (above the level of your heart.)  If you do this, your swelling will be diminished and your pain will be diminished as well.    Finally, it is generally expected that night time symptoms of pain and numbness should be improved within the first 1-2 weeks after surgery. It may take nine to twelve months for a full recovery of your carpal or cubital tunnel, such that the numbness in your fingertips will totally go away.     Dressing Care:  After surgery, make sure that your dressing is kept dry.  You can take a shower if you cover your arm with a plastic bag, such as a newspaper bag, and use tape or rubber bands. If your dressing gets wet you may take it off, place Band-Aids on the wound and re-cover it with sterile dressings which you can obtain at your local drug store.    Please remove your dressing down to the incision 5 days after your surgery. For example, if your had surgery on a Monday, do this on Saturday.  If your surgery was on Thursday, do this on Tuesday.   If your dressing gets wet prior to removing it, please take if off and place something clean, or bandaids, on the wound.    Weight Bearing:  You should NOT bear weight >5lbs  through your operative extremity. Do not push off using your operative extremity.     It is ok to move your fingers as tolerated to prevent stiffness. You may also use your operative hand to assist with light activities of daily living such as putting on clothes, brushing your teeth and eating as tolerated    Please work on these finger range of motions exercises between now and you follow up visit:           Follow Up:  If you don't already have a scheduled postoperative appointment, please call our office for a follow-up appointment. It is best to call the day after surgery to make an appointment in 10-14 days.  At your first postoperative visit, you will be seen by either Dr. Castillo, his PA; or one of their associates. The sutures will be removed and you may be asked to see a hand therapist to optimize your functional result. Each of the hand therapists that you will be referred to have received special training in the care of the hand and upper extremity.    When to Call:  It is normal to see minor staining on the hand surgery dressing after surgery. If there is significant bleeding, you are advised to call the office during regular office hours to have this checked.     If you feel that you have a surgical emergency postoperatively that requires immediate attention, please call [phone]. In addition, any emergency can also be handled by the emergency room.      If you have questions regarding your surgery postop that you feel requires attention, please call the office.   If this occurs after our regular office hours, there is a message with instructions to talk to the physician on call.

## 2024-10-21 ENCOUNTER — ANESTHESIA (OUTPATIENT)
Age: 59
End: 2024-10-21
Payer: OTHER MISCELLANEOUS

## 2024-10-21 ENCOUNTER — HOSPITAL ENCOUNTER (OUTPATIENT)
Age: 59
Setting detail: OUTPATIENT SURGERY
Discharge: HOME | End: 2024-10-21
Attending: ORTHOPAEDIC SURGERY | Admitting: ORTHOPAEDIC SURGERY
Payer: OTHER MISCELLANEOUS

## 2024-10-21 VITALS
WEIGHT: 209.6 LBS | TEMPERATURE: 97 F | DIASTOLIC BLOOD PRESSURE: 77 MMHG | BODY MASS INDEX: 28.39 KG/M2 | SYSTOLIC BLOOD PRESSURE: 116 MMHG | RESPIRATION RATE: 20 BRPM | OXYGEN SATURATION: 96 % | HEIGHT: 72 IN | HEART RATE: 79 BPM

## 2024-10-21 DIAGNOSIS — G56.23 CUBITAL TUNNEL SYNDROME, BILATERAL: ICD-10-CM

## 2024-10-21 DIAGNOSIS — Z47.89 AFTERCARE FOLLOWING SURGERY OF THE MUSCULOSKELETAL SYSTEM: ICD-10-CM

## 2024-10-21 DIAGNOSIS — G56.03 CARPAL TUNNEL SYNDROME, BILATERAL: Primary | ICD-10-CM

## 2024-10-21 LAB
GLUCOSE SERPL-MCNC: 146 MG/DL (ref 65–140)
GLUCOSE SERPL-MCNC: 176 MG/DL (ref 65–140)

## 2024-10-21 PROCEDURE — 82948 REAGENT STRIP/BLOOD GLUCOSE: CPT

## 2024-10-21 PROCEDURE — 24305 TENDON LNGTH UPR A/E EA TDN: CPT | Performed by: ORTHOPAEDIC SURGERY

## 2024-10-21 PROCEDURE — NC001 PR NO CHARGE: Performed by: ORTHOPAEDIC SURGERY

## 2024-10-21 PROCEDURE — 64721 CARPAL TUNNEL SURGERY: CPT | Performed by: ORTHOPAEDIC SURGERY

## 2024-10-21 PROCEDURE — 64718 REVISE ULNAR NERVE AT ELBOW: CPT | Performed by: ORTHOPAEDIC SURGERY

## 2024-10-21 PROCEDURE — 64721 CARPAL TUNNEL SURGERY: CPT

## 2024-10-21 PROCEDURE — 24305 TENDON LNGTH UPR A/E EA TDN: CPT

## 2024-10-21 PROCEDURE — 64718 REVISE ULNAR NERVE AT ELBOW: CPT

## 2024-10-21 RX ORDER — CEFAZOLIN SODIUM 2 G/50ML
2000 SOLUTION INTRAVENOUS ONCE
Status: COMPLETED | OUTPATIENT
Start: 2024-10-21 | End: 2024-10-21

## 2024-10-21 RX ORDER — PROMETHAZINE HYDROCHLORIDE 25 MG/ML
12.5 INJECTION, SOLUTION INTRAMUSCULAR; INTRAVENOUS ONCE
Status: DISCONTINUED | OUTPATIENT
Start: 2024-10-21 | End: 2024-10-21 | Stop reason: HOSPADM

## 2024-10-21 RX ORDER — ACETAMINOPHEN 325 MG/1
650 TABLET ORAL EVERY 6 HOURS PRN
Status: DISCONTINUED | OUTPATIENT
Start: 2024-10-21 | End: 2024-10-21 | Stop reason: HOSPADM

## 2024-10-21 RX ORDER — FENTANYL CITRATE 50 UG/ML
INJECTION, SOLUTION INTRAMUSCULAR; INTRAVENOUS AS NEEDED
Status: DISCONTINUED | OUTPATIENT
Start: 2024-10-21 | End: 2024-10-21

## 2024-10-21 RX ORDER — BUPIVACAINE HYDROCHLORIDE 5 MG/ML
INJECTION, SOLUTION EPIDURAL; INTRACAUDAL
Status: COMPLETED | OUTPATIENT
Start: 2024-10-21 | End: 2024-10-21

## 2024-10-21 RX ORDER — LIDOCAINE HYDROCHLORIDE 10 MG/ML
INJECTION, SOLUTION EPIDURAL; INFILTRATION; INTRACAUDAL; PERINEURAL AS NEEDED
Status: DISCONTINUED | OUTPATIENT
Start: 2024-10-21 | End: 2024-10-21

## 2024-10-21 RX ORDER — PROPOFOL 10 MG/ML
INJECTION, EMULSION INTRAVENOUS AS NEEDED
Status: DISCONTINUED | OUTPATIENT
Start: 2024-10-21 | End: 2024-10-21

## 2024-10-21 RX ORDER — ONDANSETRON 4 MG/1
4 TABLET, FILM COATED ORAL EVERY 8 HOURS PRN
Qty: 10 TABLET | Refills: 0 | Status: SHIPPED | OUTPATIENT
Start: 2024-10-21

## 2024-10-21 RX ORDER — ALBUTEROL SULFATE 0.83 MG/ML
2.5 SOLUTION RESPIRATORY (INHALATION) ONCE AS NEEDED
Status: DISCONTINUED | OUTPATIENT
Start: 2024-10-21 | End: 2024-10-21 | Stop reason: HOSPADM

## 2024-10-21 RX ORDER — DOCUSATE SODIUM 100 MG/1
100 CAPSULE, LIQUID FILLED ORAL DAILY PRN
Qty: 10 CAPSULE | Refills: 0 | Status: SHIPPED | OUTPATIENT
Start: 2024-10-21

## 2024-10-21 RX ORDER — MIDAZOLAM HYDROCHLORIDE 2 MG/2ML
2 INJECTION, SOLUTION INTRAMUSCULAR; INTRAVENOUS ONCE
Status: COMPLETED | OUTPATIENT
Start: 2024-10-21 | End: 2024-10-21

## 2024-10-21 RX ORDER — ACETAMINOPHEN 500 MG
1000 TABLET ORAL EVERY 8 HOURS PRN
Qty: 60 TABLET | Refills: 0 | Status: SHIPPED | OUTPATIENT
Start: 2024-10-21

## 2024-10-21 RX ORDER — ACETAMINOPHEN 325 MG/1
975 TABLET ORAL ONCE
Status: COMPLETED | OUTPATIENT
Start: 2024-10-21 | End: 2024-10-21

## 2024-10-21 RX ORDER — SODIUM CHLORIDE, SODIUM LACTATE, POTASSIUM CHLORIDE, CALCIUM CHLORIDE 600; 310; 30; 20 MG/100ML; MG/100ML; MG/100ML; MG/100ML
125 INJECTION, SOLUTION INTRAVENOUS CONTINUOUS
Status: DISCONTINUED | OUTPATIENT
Start: 2024-10-21 | End: 2024-10-21 | Stop reason: HOSPADM

## 2024-10-21 RX ORDER — ONDANSETRON 2 MG/ML
INJECTION INTRAMUSCULAR; INTRAVENOUS AS NEEDED
Status: DISCONTINUED | OUTPATIENT
Start: 2024-10-21 | End: 2024-10-21

## 2024-10-21 RX ORDER — FENTANYL CITRATE/PF 50 MCG/ML
50 SYRINGE (ML) INJECTION
Status: DISCONTINUED | OUTPATIENT
Start: 2024-10-21 | End: 2024-10-21 | Stop reason: HOSPADM

## 2024-10-21 RX ORDER — OXYCODONE HYDROCHLORIDE 5 MG/1
5 TABLET ORAL EVERY 6 HOURS PRN
Qty: 5 TABLET | Refills: 0 | Status: SHIPPED | OUTPATIENT
Start: 2024-10-21 | End: 2024-10-31

## 2024-10-21 RX ORDER — HYDROMORPHONE HCL IN WATER/PF 6 MG/30 ML
0.2 PATIENT CONTROLLED ANALGESIA SYRINGE INTRAVENOUS
Status: DISCONTINUED | OUTPATIENT
Start: 2024-10-21 | End: 2024-10-21 | Stop reason: HOSPADM

## 2024-10-21 RX ORDER — OXYCODONE HYDROCHLORIDE 5 MG/1
5 TABLET ORAL EVERY 6 HOURS PRN
Status: DISCONTINUED | OUTPATIENT
Start: 2024-10-21 | End: 2024-10-21 | Stop reason: HOSPADM

## 2024-10-21 RX ORDER — PROPOFOL 10 MG/ML
INJECTION, EMULSION INTRAVENOUS CONTINUOUS PRN
Status: DISCONTINUED | OUTPATIENT
Start: 2024-10-21 | End: 2024-10-21

## 2024-10-21 RX ADMIN — ONDANSETRON 4 MG: 2 INJECTION INTRAMUSCULAR; INTRAVENOUS at 07:43

## 2024-10-21 RX ADMIN — ACETAMINOPHEN 325MG 975 MG: 325 TABLET ORAL at 06:25

## 2024-10-21 RX ADMIN — PROPOFOL 50 MG: 10 INJECTION, EMULSION INTRAVENOUS at 07:32

## 2024-10-21 RX ADMIN — PROPOFOL 100 MCG/KG/MIN: 10 INJECTION, EMULSION INTRAVENOUS at 07:33

## 2024-10-21 RX ADMIN — MIDAZOLAM 2 MG: 1 INJECTION INTRAMUSCULAR; INTRAVENOUS at 07:15

## 2024-10-21 RX ADMIN — CEFAZOLIN SODIUM 2000 MG: 2 SOLUTION INTRAVENOUS at 07:32

## 2024-10-21 RX ADMIN — LIDOCAINE HYDROCHLORIDE 50 MG: 10 INJECTION, SOLUTION EPIDURAL; INFILTRATION; INTRACAUDAL; PERINEURAL at 07:32

## 2024-10-21 RX ADMIN — FENTANYL CITRATE 25 MCG: 50 INJECTION INTRAMUSCULAR; INTRAVENOUS at 08:14

## 2024-10-21 RX ADMIN — PROPOFOL 30 MG: 10 INJECTION, EMULSION INTRAVENOUS at 07:39

## 2024-10-21 RX ADMIN — BUPIVACAINE HYDROCHLORIDE 20 ML: 5 INJECTION, SOLUTION EPIDURAL; INTRACAUDAL; PERINEURAL at 07:15

## 2024-10-21 RX ADMIN — SODIUM CHLORIDE, SODIUM LACTATE, POTASSIUM CHLORIDE, AND CALCIUM CHLORIDE 125 ML/HR: .6; .31; .03; .02 INJECTION, SOLUTION INTRAVENOUS at 06:32

## 2024-10-21 RX ADMIN — FENTANYL CITRATE 50 MCG: 50 INJECTION INTRAMUSCULAR; INTRAVENOUS at 07:42

## 2024-10-21 RX ADMIN — FENTANYL CITRATE 25 MCG: 50 INJECTION INTRAMUSCULAR; INTRAVENOUS at 07:36

## 2024-10-21 NOTE — ANESTHESIA POSTPROCEDURE EVALUATION
Post-Op Assessment Note    CV Status:  Stable    Pain management: adequate    Multimodal analgesia used between 6 hours prior to anesthesia start to PACU discharge    Mental Status:  Alert   Hydration Status:  Stable   PONV Controlled:  None   Airway Patency:  Patent     Post Op Vitals Reviewed: Yes    No anethesia notable event occurred.    Staff: Anesthesiologist           Last Filed PACU Vitals:  Vitals Value Taken Time   Temp 97.5 °F (36.4 °C) 10/21/24 0900   Pulse 65 10/21/24 0910   /67 10/21/24 0900   Resp 16 10/21/24 0910   SpO2 96 % 10/21/24 0910   Vitals shown include unfiled device data.    Modified Andrea:  Activity: 0 (10/21/2024  8:45 AM)  Respiration: 1 (10/21/2024  8:45 AM)  Circulation: 1 (10/21/2024  8:45 AM)  Consciousness: 0 (10/21/2024  8:45 AM)  Oxygen Saturation: 1 (10/21/2024  8:45 AM)  Modified Andrea Score: 3 (10/21/2024  8:45 AM)

## 2024-10-21 NOTE — OP NOTE
OPERATIVE REPORT  PATIENT NAME: Fidel Calderon    :  1965  MRN: [id number]  Pt Location: [facility] OR ROOM 06    SURGERY DATE: 10/21/2024    Surgeons and Role:     * Papo Castillo MD - Primary     * Genevieve Hoover PA-C - Assisting    Preop Diagnosis:  Carpal tunnel syndrome, bilateral [G56.03]  Cubital tunnel syndrome, bilateral [G56.23]    Post-Op Diagnosis Codes:     * Carpal tunnel syndrome, bilateral [G56.03]     * Cubital tunnel syndrome, bilateral [G56.23]    Procedure(s):  Left - RELEASE CARPAL TUNNEL   Left - RELEASE CUBITAL TUNNEL WITH SUBMUSCULAR ANTERIOR TRANSPOSITION    Specimen(s):  * No specimens in log *    Estimated Blood Loss:   Minimal    Drains:  * No LDAs found *    Anesthesia Type:   Regional with Sedation    Operative Indications:  Carpal tunnel syndrome, bilateral [G56.03]  Cubital tunnel syndrome, bilateral [G56.23]    58-year-old male with bilateral carpal and cubital tunnel syndrome as well as right thumb CMC arthritis.  He has failed appropriate nonoperative management elected proceed with a left-sided carpal and cubital tunnel release for quality-of-life purposes as his symptoms are affecting his work.    Operative Findings:  Thickened transverse carpal ligament  Ulnar nerve compressed primarily at Cordova's ligament      Complications:   None    Procedure and Technique:  On the day of surgery, I met the patient in the pre-operative area. The patient was identified by name and . Once again the risks benefits and alternatives of carpal tunnel release and cubital tunnel release were discussed with the patient. he elected to proceed with surgery.     Patient underwent an anesthetic block. Patient was brought to the OR. They underwent anesthetic sedation. A tourniquet was applied. The operative extremity was prepped and draped in a standard fashion. A timeout was performed prior to incision identifying the name and laterality of the procedure. Preoperative antibiotics were  administered.     The operative limb was exsanguinated with a Hemoclear tourniquet.    We started with a carpal tunnel release.  A 2 cm incision was carried out at the intersection of Benson's cardinal line and the radial aspect of the ring finger proximally towards the wrist flexion crease. Skin, subcutaneous tissue, and palmar fascia was incised. The transverse carpal ligament was identified. The distal extent was identified by palmar adipose tissue.    The release began at the distal extent and was carried out sharply through the operative field of view. We continued distally, carefully dissecting and incising the TCL until the palmar arch was identified.     We then used the blunt tipped scissors to dissect and isolate the proximal extent of the TCL and the antebrachial fascia, and performed a smooth release proximally. We directly visualized the intact median nerve at the proximal extent of the release. We checked our release proximally and distally and noted no further compressing fascial or ligamentous bands.     We then turned our attention to the elbow. An 6 cm curvilinear incision was carried out just dorsal to and centered over the medial epicondyle. The subcutaneous tissue was carefully dissected to identify the medial antebrachial cutaneous nerve. This was carefully preserved. The ulnar nerve was then identified proximally at it's exit from the medial intermuscular septum into the posterior compartment. There was a low lying medial triceps putting pressure on the nerve. The septum was then incised completely freeing the proximal aspect of the nerve. We then carefully dissected along the nerve distally, releasing the overlying fascia, including Cordova's ligament. We then moved distally, incising the overlying FCU fascia. The two heads of the FCU were gently split and the underlying deep fascia over the nerve was also released. Motor branches to the FCU were preserved. The point of maximal compression  was at Cordova's ligament.     As the elbow as ranged, the nerve was noted to dislocate anteriorly. We decided to perform an anterior submuscular transposition as he had very little subcutaneous fat.  We raised our flap anteriorly exposing the flexor pronator muscle origin.  We marked out and performed a step cut in the flexor pronator fascia to allow for later tension-free repair.  We then dissected down through the muscle and remove the intermuscular septi creating a path for the ulnar nerve deep to the flexor pronator muscle without any kink points.  We then removed the distal 2 cm of the medial intermuscular septum.  The nerve was superficially dissected and transposed anteriorly.  We had to do an intra neural dissection of the FCU motor branch to decrease tension on the ulnar nerve.  The new distal kink point was released at the deep FCU fascia.  The nerve was then underlying anterior medial condyle and a fairly straight line with no kinking or areas of compression identified.  The elbow was ranged once again and noted to have smooth gliding of the ulnar nerve, and no compression at proximal or distal extent.  The nerve was stable for medial epicondyle.  The fascia was then sutured closed in a slightly lengthened manner to ensure there is no added compression to the nerve.  I could easily fit my finger underneath the fascial repair and above the nerve.    The tourniquet was deflated and hemostasis was achieved with bipolar electrocautery.  There was some increased bleeding from the carpal tunnel incision and the incision had to be increased in length approximately 1 cm to improve visualization.  We able to find a small bleeding vein in the thenar musculature which was coagulated bipolar cautery.  We then had excellent hemostasis.  We thoroughly irrigated the wounds.      The elbow incision was closed in a layered fashion, with 3-0 vicryl for the subcutaneous tissue and 4-0 nylon for the skin. We closed the hand  incision with 4-0 nylon suture.      Sterile dressing was applied and sling.      Post operatively the patient will be allowed weight bearing on the operative extremity for light activities and elbow range of motion as tolerated. They will follow up as planned within 2 weeks.      I was present for the entire procedure., A qualified resident physician was not available., and A physician assistant was required during the procedure for retraction, tissue handling, dissection and suturing.    Patient Disposition:  PACU              SIGNATURE: Papo Castillo MD  DATE: October 21, 2024  TIME: 8:38 AM

## 2024-10-21 NOTE — ANESTHESIA PREPROCEDURE EVALUATION
Procedure:  RELEASE CARPAL TUNNEL - LEFT (Left: Wrist)  RELEASE CUBITAL TUNNEL - LEFT, WITH POSSIBLE ANTERIOR TRANSPOSITION OF THE ULNAR NERVE (Left: Elbow)    ECHO (04/10/23):   Left Ventricle: Left ventricular cavity size is normal. Wall thickness is mildly increased. There is mild concentric hypertrophy. The left ventricular ejection fraction is 55%. Systolic function is normal. inferior mild segmental hypokinetic Diastolic function is normal.     Relevant Problems   ANESTHESIA (within normal limits)      CARDIO   (+) CAD (coronary artery disease)   (+) Mixed hyperlipidemia   (+) 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, greater than 8 weeks   (+) Other chest pain   (+) Primary hypertension      ENDO   (+) Type 2 diabetes mellitus with circulatory disorder, without long-term current use of insulin (HCC)      GI/HEPATIC (within normal limits)      /RENAL (within normal limits)      GYN (within normal limits)      HEMATOLOGY (within normal limits)      MUSCULOSKELETAL (within normal limits)      NEURO/PSYCH (within normal limits)      PULMONARY (within normal limits)      Surgery/Wound/Pain   (+) Status post insertion of drug eluting coronary artery stent      Orthopedic/Musculoskeletal   (+) Carpal tunnel syndrome, bilateral   (+) Cubital tunnel syndrome, bilateral      Lab Results   Component Value Date    WBC 9.14 03/02/2024    HGB 17.1 (H) 03/02/2024    HCT 50.7 (H) 03/02/2024    MCV 94 03/02/2024     03/02/2024     Lab Results   Component Value Date     05/26/2018    SODIUM 138 05/11/2024    K 4.1 05/11/2024     05/11/2024    CO2 26 05/11/2024    ANIONGAP 13.0 05/26/2018    AGAP 9 05/11/2024    BUN 21 05/11/2024    CREATININE 0.91 05/11/2024    GLUC 108 (H) 05/11/2024    GLUF 102 (H) 12/02/2023    CALCIUM 9.6 05/11/2024    AST 15 05/11/2024    ALT 12 05/11/2024    ALKPHOS 62 05/11/2024    PROT 7.2 05/26/2018    TP 7.7 05/11/2024    BILITOT 0.7 05/26/2018    TBILI 0.5 05/11/2024    EGFR  97 05/11/2024     Lab Results   Component Value Date    PTT 25 11/30/2015     Lab Results   Component Value Date    INR 1.01 11/30/2015    INR 0.94 06/24/2015    PROTIME 13.1 11/30/2015    PROTIME 12.3 06/24/2015       Physical Exam    Airway    Mallampati score: II  TM Distance: >3 FB  Neck ROM: full     Dental   No notable dental hx     Cardiovascular  Rhythm: regular, Rate: normal, Cardiovascular exam normal    Pulmonary  Pulmonary exam normal     Other Findings        Anesthesia Plan  ASA Score- 3     Anesthesia Type- regional with ASA Monitors.         Additional Monitors:     Airway Plan:     Comment: Supraclavicular nerve block with IV Sedation.       Plan Factors-Exercise tolerance (METS): >4 METS.    Chart reviewed. EKG reviewed.  Existing labs reviewed. Patient summary reviewed.      Patient did not smoke on day of surgery.    Obstructive sleep apnea risk education given perioperatively.        Induction- intravenous.    Postoperative Plan- Plan for postoperative opioid use.         Informed Consent- Anesthetic plan and risks discussed with patient.  I personally reviewed this patient with the CRNA. Discussed and agreed on the Anesthesia Plan with the CRNA..      ):

## 2024-10-21 NOTE — ANESTHESIA POSTPROCEDURE EVALUATION
Post-Op Assessment Note    CV Status:  Stable  Pain Score: 0    Pain management: adequate       Mental Status:  Sleepy   Hydration Status:  Stable   PONV Controlled:  None   Airway Patency:  Patent (snoring)     Post Op Vitals Reviewed: Yes    No anethesia notable event occurred.    Staff: CRNA           Last Filed PACU Vitals:  Vitals Value Taken Time   Temp 97.6F    Pulse 69 10/21/24 0845   /71 10/21/24 0845   Resp 55 10/21/24 0845   SpO2 93 % 10/21/24 0845   Vitals shown include unfiled device data.    Modified Andrea:  No data recorded

## 2024-10-21 NOTE — ANESTHESIA PROCEDURE NOTES
Peripheral Block    Patient location during procedure: holding area  Start time: 10/21/2024 7:15 AM  Reason for block: at surgeon's request and post-op pain management  Staffing  Performed by: Maldonado Ruiz MD  Authorized by: Maldonado Ruiz MD    Preanesthetic Checklist  Completed: patient identified, IV checked, site marked, risks and benefits discussed, surgical consent, monitors and equipment checked, pre-op evaluation and timeout performed  Peripheral Block  Patient position: sitting  Prep: ChloraPrep  Patient monitoring: frequent blood pressure checks, continuous pulse oximetry and heart rate  Block type: Supraclavicular  Laterality: left  Injection technique: single-shot  Procedures: ultrasound guided, Ultrasound guidance required for the procedure to increase accuracy and safety of medication placement and decrease risk of complications.  Ultrasound permanent image saved  bupivacaine (PF) (MARCAINE) 0.5 % injection 20 mL - Perineural   20 mL - 10/21/2024 7:15:00 AM  Needle  Needle type: Stimuplex   Needle gauge: 20 G  Needle length: 4 in  Needle localization: anatomical landmarks and ultrasound guidance  Assessment  Injection assessment: incremental injection, frequent aspiration, injected with ease, negative aspiration, negative for heart rate change, no paresthesia on injection, no symptoms of intraneural/intravenous injection and needle tip visualized at all times  Paresthesia pain: none  Post-procedure:  site cleaned  patient tolerated the procedure well with no immediate complications

## 2024-10-25 ENCOUNTER — NURSE TRIAGE (OUTPATIENT)
Age: 59
End: 2024-10-25

## 2024-10-25 NOTE — TELEPHONE ENCOUNTER
"Chief Complaint: SOB    History of Present Illness (HPI): received call from pt wanted to report some SOB.  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  She is calling and requesting another echo be done to confirm her findings.  Pt currently has no chest pain or other symptoms and blood pressures have been well controlled.  Pt notes SOB on exertion only, mainly when walking up the stairs.    VS/Weight: unsure    Pain: No    Risk Factors: Hypertension and Diabetic s/P LACEY    Recent Testing: Cardiac Catheterization 3/18/24    Medicine: irbesartan 75mg, metoprolol 25mg, Jardiance, 25mg,     Upcoming Office Visit: Yes 2/25/25    Last Office Visit: 8/21/24      Answer Assessment - Initial Assessment Questions  1. RESPIRATORY STATUS: \"Describe your breathing?\" (e.g., wheezing, shortness of breath, unable to speak, severe coughing)       Pt notes some SOB with exertion  2. ONSET: \"When did this breathing problem begin?\"       Past few weeks  3. PATTERN \"Does the difficult breathing come and go, or has it been constant since it started?\"       Walking up the stairs  4. SEVERITY: \"How bad is your breathing?\" (e.g., mild, moderate, severe)       No sob at rest or with conversation  5. RECURRENT SYMPTOM: \"Have you had difficulty breathing before?\" If Yes, ask: \"When was the last time?\" and \"What happened that time?\"       denies  6. CARDIAC HISTORY: \"Do you have any history of heart disease?\" (e.g., heart attack, angina, bypass surgery, angioplasty)       Heart attack PCI in [facility]  7. LUNG HISTORY: \"Do you have any history of lung disease?\"  (e.g., pulmonary embolus, asthma, emphysema)      denies  8. CAUSE: \"What do you think is causing the breathing problem?\"       Unsure   9. OTHER SYMPTOMS: \"Do you have any other symptoms?\" (e.g., chest pain, cough, dizziness, " "fever, runny nose)      denies  10. O2 SATURATION MONITOR:  \"Do you use an oxygen saturation monitor (pulse oximeter) at home?\" If Yes, ask: \"What is your reading (oxygen level) today?\" \"What is your usual oxygen saturation reading?\" (e.g., 95%)        Unsure    Protocols used: Breathing Difficulty-Adult-OH    "

## 2024-11-01 ENCOUNTER — OFFICE VISIT (OUTPATIENT)
Dept: OBGYN CLINIC | Facility: CLINIC | Age: 59
End: 2024-11-01

## 2024-11-01 VITALS
HEIGHT: 72 IN | BODY MASS INDEX: 28.31 KG/M2 | WEIGHT: 209 LBS | SYSTOLIC BLOOD PRESSURE: 110 MMHG | DIASTOLIC BLOOD PRESSURE: 68 MMHG

## 2024-11-01 DIAGNOSIS — Z98.890 S/P CARPAL TUNNEL RELEASE: Primary | ICD-10-CM

## 2024-11-01 DIAGNOSIS — Z98.890 S/P CUBITAL TUNNEL RELEASE: ICD-10-CM

## 2024-11-01 PROCEDURE — 99024 POSTOP FOLLOW-UP VISIT: CPT | Performed by: ORTHOPAEDIC SURGERY

## 2024-11-01 NOTE — PROGRESS NOTES
HAND & UPPER EXTREMITY OFFICE VISIT   Referred By:  No referring provider defined for this encounter.      Chief Complaint:     Bilateral hand pain and numbness    Surgery:  Surgery Date: 10/21/2024 - Release Carpal Tunnel - Left - Left and Release Cubital Tunnel - Left, With Submuscular Transposition - Left     History of Present Illness:   Patient presents now 11 days status post the above surgery. he reports overall doing well since the procedure. He does note significant bruising over the medial elbow. His pain has been well-controlled though. He does still have some numbness and tingling in the fingers which is similar to pre-op. Denies any fevers or chills.     Past Medical History:  Past Medical History:   Diagnosis Date    Cardiac disease     Diabetes mellitus (HCC)     Hyperlipidemia     Hypertension     MI (myocardial infarction) (HCC)     Sleep apnea      Past Surgical History:   Procedure Laterality Date    CARDIAC CATHETERIZATION N/A 03/06/2023    Procedure: Cardiac catheterization;  Surgeon: Fernando Panda MD;  Location: [facility];  Service: Cardiology    CARDIAC CATHETERIZATION N/A 03/06/2023    Procedure: Cardiac pci;  Surgeon: Fernando Panda MD;  [facility]: [facility];  Service: Cardiology    CARDIAC CATHETERIZATION N/A 03/06/2023    Procedure: Cardiac Coronary Angiogram;  Surgeon: Fernando Panda MD;  [facility]: [facility];  Service: Cardiology    CARDIAC CATHETERIZATION Left 03/18/2024    Procedure: Cardiac catheterization;  Surgeon: Bob Fraser MD;  [facility]: [facility];  Service: Cardiology    CARDIAC CATHETERIZATION N/A 03/18/2024    Procedure: Cardiac pci;  Surgeon: Bob Fraser MD;  [facility]: [facility];  Service: Cardiology    CARDIAC SURGERY      COLONOSCOPY      CORONARY ANGIOPLASTY WITH STENT PLACEMENT      DEBRIDEMENT TENNIS ELBOW      HERNIA REPAIR      KNEE ARTHROSCOPY      KY NEUROPLASTY &/TRANSPOS MEDIAN NRV CARPAL TUNNE Left  10/21/2024    Procedure: RELEASE CARPAL TUNNEL - LEFT;  Surgeon: Papo Castillo MD;  Location: [facility];  Service: Orthopedics    AR NEUROPLASTY &/TRANSPOSITION ULNAR NERVE ELBOW Left 10/21/2024    Procedure: RELEASE CUBITAL TUNNEL - LEFT, WITH SUBMUSCULAR TRANSPOSITION;  Surgeon: Papo Castillo MD;  Location: [facility];  Service: Orthopedics    SHOULDER ARTHROSCOPY      VASECTOMY       Family History   Problem Relation Age of Onset    Hypertension Maternal Grandfather     Diabetes type I Maternal Grandmother     Hypertension Paternal Grandfather     Diabetes type II Paternal Grandmother      Social History     Socioeconomic History    Marital status: /Civil Union     Spouse name: Not on file    Number of children: Not on file    Years of education: Not on file    Highest education level: Not on file   Occupational History    Not on file   Tobacco Use    Smoking status: Former     Current packs/day: 0.00     Average packs/day: 0.5 packs/day for 28.0 years (14.0 ttl pk-yrs)     Types: Cigarettes     Start date: 1977     Quit date: 2005     Years since quittin.8    Smokeless tobacco: Former     Types: Chew     Quit date: 1985   Vaping Use    Vaping status: Never Used   Substance and Sexual Activity    Alcohol use: Yes     Alcohol/week: 2.0 standard drinks of alcohol     Types: 2 Shots of liquor per week     Comment: 2 drinks weekly    Drug use: No    Sexual activity: Yes     Birth control/protection: Post-menopausal   Other Topics Concern    Not on file   Social History Narrative    Not on file     Social Determinants of Health     Financial Resource Strain: Not on file   Food Insecurity: Not on file   Transportation Needs: Not on file   Physical Activity: Not on file   Stress: Not on file   Social Connections: Not on file   Intimate Partner Violence: Not on file   Housing Stability: Not on file     Scheduled Meds:  Continuous Infusions:No current facility-administered  medications for this visit.    PRN Meds:.  Allergies   Allergen Reactions    Banana - Food Allergy Throat Swelling    Tetanus Toxoid Other (See Comments)       Physical Examination:    /68   Ht 6' (1.829 m)   Wt 94.8 kg (209 lb)   BMI 28.35 kg/m²     Gen: A&Ox3, NAD    Left Upper Extremity:  Dressing clean and dry, removed  Incision healing well without signs of infection   Sutures intact, removed  Positive swelling and significant ecchymosis over medial and anterior elbow  TTP around elbow incision  Sensation intact to light touch in the axillary median, ulnar, and radial nerve distributions  Full digital and elbow ROM  Warm, well-perfused digits  Cap refill <2s      Studies:  No new imaging to review    Assessment and Plan:  1. S/P carpal tunnel release        2. S/P cubital tunnel release            59 y.o. male presents 11 days status post Release Carpal Tunnel - Left - Left and Release Cubital Tunnel - Left, With Submuscular Transposition - Left. Sutures removed in the office today. he may continue regular hygiene and apply lotions/oils and perform scar massage as needed. he may participate in all activities as tolerated without further restrictions. He is required to do a significant amount of heavy lifting for work as a  and does not feel ready to return to work yet. Recommend gradually advancing his use of the left arm as tolerated. It is recommended he return to the office in 3 weeks to determine his return to work status.     he expressed understanding of the plan and agreed. We encouraged them to contact our office with any questions or concerns.         Papo Castillo MD  Hand and Upper Extremity Surgery          *This note was dictated using Dragon voice recognition software. Please excuse any word substitutions or errors.*      Scribe Attestation      I,:  Genevieve Hoover PA-C am acting as a scribe while in the presence of the attending physician.:       I,:  Papo Castillo,  MD personally performed the services described in this documentation    as scribed in my presence.:

## 2024-11-15 ENCOUNTER — APPOINTMENT (OUTPATIENT)
Age: 59
End: 2024-11-15
Payer: COMMERCIAL

## 2024-11-15 DIAGNOSIS — R80.9 MICROALBUMINURIA: ICD-10-CM

## 2024-11-15 DIAGNOSIS — E78.2 MIXED HYPERLIPIDEMIA: ICD-10-CM

## 2024-11-15 DIAGNOSIS — E55.9 VITAMIN D DEFICIENCY: ICD-10-CM

## 2024-11-15 DIAGNOSIS — E11.59 TYPE 2 DIABETES MELLITUS WITH OTHER CIRCULATORY COMPLICATION, WITHOUT LONG-TERM CURRENT USE OF INSULIN (HCC): ICD-10-CM

## 2024-11-15 DIAGNOSIS — I10 PRIMARY HYPERTENSION: ICD-10-CM

## 2024-11-15 DIAGNOSIS — Z95.5 S/P DRUG ELUTING CORONARY STENT PLACEMENT: ICD-10-CM

## 2024-11-15 DIAGNOSIS — R07.9 CHEST PAIN, UNSPECIFIED TYPE: ICD-10-CM

## 2024-11-15 LAB
25(OH)D3 SERPL-MCNC: 34.7 NG/ML (ref 30–100)
ALBUMIN SERPL BCG-MCNC: 4.8 G/DL (ref 3.5–5)
ALP SERPL-CCNC: 65 U/L (ref 34–104)
ALT SERPL W P-5'-P-CCNC: 15 U/L (ref 7–52)
ANION GAP SERPL CALCULATED.3IONS-SCNC: 10 MMOL/L (ref 4–13)
AST SERPL W P-5'-P-CCNC: 22 U/L (ref 13–39)
BILIRUB SERPL-MCNC: 0.34 MG/DL (ref 0.2–1)
BUN SERPL-MCNC: 21 MG/DL (ref 5–25)
CALCIUM SERPL-MCNC: 9.5 MG/DL (ref 8.4–10.2)
CHLORIDE SERPL-SCNC: 107 MMOL/L (ref 96–108)
CHOLEST SERPL-MCNC: 112 MG/DL (ref ?–200)
CO2 SERPL-SCNC: 27 MMOL/L (ref 21–32)
CREAT SERPL-MCNC: 0.9 MG/DL (ref 0.6–1.3)
CREAT UR-MCNC: 84.4 MG/DL
CRP SERPL QL: <1 MG/L
EST. AVERAGE GLUCOSE BLD GHB EST-MCNC: 146 MG/DL
GFR SERPL CREATININE-BSD FRML MDRD: 93 ML/MIN/1.73SQ M
GLUCOSE P FAST SERPL-MCNC: 111 MG/DL (ref 65–99)
HBA1C MFR BLD: 6.7 %
HDLC SERPL-MCNC: 26 MG/DL
LDLC SERPL CALC-MCNC: 62 MG/DL (ref 0–100)
MICROALBUMIN UR-MCNC: 54.1 MG/L
MICROALBUMIN/CREAT 24H UR: 64 MG/G CREATININE (ref 0–30)
NONHDLC SERPL-MCNC: 86 MG/DL
POTASSIUM SERPL-SCNC: 4.1 MMOL/L (ref 3.5–5.3)
PROT SERPL-MCNC: 7.9 G/DL (ref 6.4–8.4)
SODIUM SERPL-SCNC: 144 MMOL/L (ref 135–147)
TRIGL SERPL-MCNC: 120 MG/DL (ref ?–150)

## 2024-11-15 PROCEDURE — 86140 C-REACTIVE PROTEIN: CPT

## 2024-11-15 PROCEDURE — 80061 LIPID PANEL: CPT

## 2024-11-15 PROCEDURE — 36415 COLL VENOUS BLD VENIPUNCTURE: CPT

## 2024-11-15 PROCEDURE — 82306 VITAMIN D 25 HYDROXY: CPT

## 2024-11-15 PROCEDURE — 80053 COMPREHEN METABOLIC PANEL: CPT

## 2024-11-15 PROCEDURE — 82570 ASSAY OF URINE CREATININE: CPT

## 2024-11-15 PROCEDURE — 82043 UR ALBUMIN QUANTITATIVE: CPT

## 2024-11-15 PROCEDURE — 83036 HEMOGLOBIN GLYCOSYLATED A1C: CPT

## 2024-11-18 ENCOUNTER — TELEPHONE (OUTPATIENT)
Age: 59
End: 2024-11-18

## 2024-11-18 ENCOUNTER — RESULTS FOLLOW-UP (OUTPATIENT)
Dept: ENDOCRINOLOGY | Facility: CLINIC | Age: 59
End: 2024-11-18

## 2024-11-18 DIAGNOSIS — R06.02 SHORTNESS OF BREATH: Primary | ICD-10-CM

## 2024-11-18 DIAGNOSIS — Z47.89 AFTERCARE FOLLOWING SURGERY OF THE MUSCULOSKELETAL SYSTEM: Primary | ICD-10-CM

## 2024-11-18 RX ORDER — CEPHALEXIN 500 MG/1
500 CAPSULE ORAL EVERY 6 HOURS SCHEDULED
Qty: 28 CAPSULE | Refills: 0 | Status: SHIPPED | OUTPATIENT
Start: 2024-11-18 | End: 2024-11-25

## 2024-11-18 NOTE — TELEPHONE ENCOUNTER
Caller: patient    Doctor: leigh    Reason for call: patient called about incision cite being red, slightly warm to the touch and bubbly when using hydrogen persoxide. Patient stated there was some discharge over night as well.  Placed patient on hold and was unable to remove them from hold. Spoke to nurse who is going to call patient    Call back#: [phone]

## 2024-11-18 NOTE — PROGRESS NOTES
Returned patient's call from earlier today.  I reviewed his images that he sent in through [facility].  It looks like he has a suture abscess at his cubital tunnel incision.  Since it has been persistent even after decompressing we will send him a prescription for antibiotics for 1 week of Keflex.  I will see him next week in the office as scheduled for wound evaluation.  He is to call with any worsening symptoms.  All questions answered.    Papo Castillo MD

## 2024-11-18 NOTE — TELEPHONE ENCOUNTER
Called and spoke with pt, he has no fever but it is swollen, red and warm to the touch. On Saturday it did ooze white pus like drainage. Walked pt through sending pic on MYCHART. Picture available in MYCHART for review, thanks!

## 2024-11-19 ENCOUNTER — RESULTS FOLLOW-UP (OUTPATIENT)
Dept: CARDIOLOGY CLINIC | Facility: CLINIC | Age: 59
End: 2024-11-19

## 2024-11-19 NOTE — TELEPHONE ENCOUNTER
----- Message from NARGIS Vázquez sent at 11/19/2024 10:57 AM EST -----  Please call Fidel and inform him lipid panel is unchanged from previous.    CRP normal < 1    Spoke with pt re: unchanged Lipid panel from previous.

## 2024-11-20 ENCOUNTER — OFFICE VISIT (OUTPATIENT)
Dept: ENDOCRINOLOGY | Facility: CLINIC | Age: 59
End: 2024-11-20
Payer: COMMERCIAL

## 2024-11-20 VITALS
WEIGHT: 204.6 LBS | BODY MASS INDEX: 27.71 KG/M2 | HEIGHT: 72 IN | DIASTOLIC BLOOD PRESSURE: 74 MMHG | TEMPERATURE: 98 F | SYSTOLIC BLOOD PRESSURE: 138 MMHG | HEART RATE: 76 BPM | OXYGEN SATURATION: 98 %

## 2024-11-20 DIAGNOSIS — I10 PRIMARY HYPERTENSION: ICD-10-CM

## 2024-11-20 DIAGNOSIS — R80.9 MICROALBUMINURIA: Primary | ICD-10-CM

## 2024-11-20 DIAGNOSIS — E78.2 MIXED HYPERLIPIDEMIA: ICD-10-CM

## 2024-11-20 DIAGNOSIS — E11.59 TYPE 2 DIABETES MELLITUS WITH OTHER CIRCULATORY COMPLICATION, WITHOUT LONG-TERM CURRENT USE OF INSULIN (HCC): ICD-10-CM

## 2024-11-20 DIAGNOSIS — E11.21 TYPE 2 DIABETES MELLITUS WITH DIABETIC NEPHROPATHY, WITHOUT LONG-TERM CURRENT USE OF INSULIN (HCC): ICD-10-CM

## 2024-11-20 PROCEDURE — 99214 OFFICE O/P EST MOD 30 MIN: CPT | Performed by: STUDENT IN AN ORGANIZED HEALTH CARE EDUCATION/TRAINING PROGRAM

## 2024-11-20 NOTE — ASSESSMENT & PLAN NOTE
Lab Results   Component Value Date    HGBA1C 6.7 (H) 11/15/2024     Diabetes  is well controlled, with A1C of 6.7%,  He was encouraged to continue his effort to manage his diabetes.  He has tolerated current regimen which will be maintained,  Ophthalmology and podiatry follow up.  Return back in 4 months   Lab prior to next visit.       Orders:    semaglutide, 1 mg/dose, (Ozempic, 1 MG/DOSE,) 4 mg/3 mL injection pen; Inject 0.75 mL (1 mg total) under the skin every 7 days

## 2024-11-20 NOTE — PROGRESS NOTES
Name: Fidel Calderon      : 1965      MRN: [id number]  Encounter Provider: Arsenio Holliday MD  Encounter Date: 2024   Encounter department: [facility] [place]  :  Assessment & Plan  Type 2 diabetes mellitus with other circulatory complication, without long-term current use of insulin ([facility])    Lab Results   Component Value Date    HGBA1C 6.7 (H) 11/15/2024     Diabetes  is well controlled, with A1C of 6.7%,  He was encouraged to continue his effort to manage his diabetes.  He has tolerated current regimen which will be maintained,  Ophthalmology and podiatry follow up.  Return back in 4 months   Lab prior to next visit.       Orders:    semaglutide, 1 mg/dose, (Ozempic, 1 MG/DOSE,) 4 mg/3 mL injection pen; Inject 0.75 mL (1 mg total) under the skin every 7 days    Type 2 diabetes mellitus with diabetic nephropathy, without long-term current use of insulin ([facility])    Lab Results   Component Value Date    HGBA1C 6.7 (H) 11/15/2024       Orders:    Empagliflozin (Jardiance) 25 MG TABS; Take 1 tablet (25 mg total) by mouth every morning    Hemoglobin A1C; Future    Comprehensive metabolic panel; Future    Lipid Panel with Direct LDL reflex; Future    Albumin / creatinine urine ratio; Future    Microalbuminuria  We will consider to increase Irbesartan if microalbuminuria did not improved, he is already on SGLT-2 I    Orders:    Comprehensive metabolic panel; Future    Albumin / creatinine urine ratio; Future    Primary hypertension  Controlled, blood pressure of 130/74, the goal is < 130/70.  Continue Irbesartan, we may consider increasing the dose if microalbuminuria did not improve.     Orders:    Comprehensive metabolic panel; Future    Albumin / creatinine urine ratio; Future    Mixed hyperlipidemia    Continue Crestor.    Orders:    Lipid Panel with Direct LDL reflex; Future        History of Present Illness     HPI  Fidel Calderon is a 59 y.o. male who presents for  follow-up for type 2 diabetes without long-term use of insulin.        Denies recent illness or hospitalizations.    Current regimen:     Metformin 1000 mg  once a day  Jardiance 25 mg once a day  Ozempic 1 mg once weekly      SMBG : x 2,   Blood sugars ranging from 100  to 140 mg/dl    Opthamology:  UTD;   Podiatry: UTD    on ACE inhibitor or ARB: Irbesartan 75 mg daily.  on statin: Crestor 40 mg daily.    History obtained from: patient    Review of Systems   Constitutional:  Negative for appetite change, fatigue and unexpected weight change.   HENT:  Negative for trouble swallowing.    Eyes:  Negative for visual disturbance.   Cardiovascular:  Negative for chest pain and palpitations.   Gastrointestinal:  Negative for abdominal pain, constipation, diarrhea, nausea and vomiting.   Endocrine: Negative for polydipsia, polyphagia and polyuria.     Medical History Reviewed by provider this encounter:     .  Current Outpatient Medications on File Prior to Visit   Medication Sig Dispense Refill    amLODIPine (NORVASC) 5 mg tablet Take 1 tablet (5 mg total) by mouth daily 90 tablet 3    aspirin (ECOTRIN LOW STRENGTH) 81 mg EC tablet Take 1 tablet by mouth daily      cephalexin (KEFLEX) 500 mg capsule Take 1 capsule (500 mg total) by mouth every 6 (six) hours for 7 days 28 capsule 0    Cholecalciferol (VITAMIN D) 2000 units CAPS Take by mouth      docusate sodium (COLACE) 100 mg capsule Take 1 capsule (100 mg total) by mouth daily as needed for constipation 10 capsule 0    irbesartan (AVAPRO) 75 mg tablet Take 1 tablet (75 mg total) by mouth daily 90 tablet 1    metFORMIN (GLUCOPHAGE) 1000 MG tablet Take 1,000 mg by mouth daily with breakfast      metoprolol succinate (TOPROL-XL) 25 mg 24 hr tablet Take 1 tablet (25 mg total) by mouth daily 100 tablet 1    nitroglycerin (NITROSTAT) 0.4 mg SL tablet Place 1 tablet (0.4 mg total) under the tongue every 5 (five) minutes as needed for chest pain 90 tablet 3    rosuvastatin  (CRESTOR) 40 MG tablet TAKE 1 TABLET DAILY 90 tablet 1    tadalafil (CIALIS) 10 MG tablet Take 10 mg by mouth as needed      ticagrelor (BRILINTA) 90 MG Take 1 tablet (90 mg total) by mouth 2 (two) times a day 180 tablet 0    zolpidem (AMBIEN) 10 mg tablet one tab at bedtime as needed      [DISCONTINUED] Empagliflozin (Jardiance) 25 MG TABS Take 1 tablet (25 mg total) by mouth every morning 90 tablet 1    [DISCONTINUED] semaglutide, 1 mg/dose, (Ozempic, 1 MG/DOSE,) 4 mg/3 mL injection pen Inject 0.75 mL (1 mg total) under the skin every 7 days 9 mL 1    acetaminophen (TYLENOL) 500 mg tablet Take 2 tablets (1,000 mg total) by mouth every 8 (eight) hours as needed for mild pain or moderate pain (Patient not taking: Reported on 11/20/2024) 60 tablet 0    ondansetron (ZOFRAN) 4 mg tablet Take 1 tablet (4 mg total) by mouth every 8 (eight) hours as needed for nausea or vomiting (Patient not taking: Reported on 11/20/2024) 10 tablet 0    ticagrelor (Brilinta) 90 MG Take 1 tablet (90 mg total) by mouth 2 (two) times a day (Patient not taking: Reported on 11/20/2024) 20 tablet 0    ticagrelor (Brilinta) 90 MG Take 1 tablet (90 mg total) by mouth every 12 (twelve) hours (Patient not taking: Reported on 11/20/2024) 180 tablet 3     No current facility-administered medications on file prior to visit.         Objective   /74 (BP Location: Left arm, Patient Position: Sitting, Cuff Size: Adult)   Pulse 76   Temp 98 °F (36.7 °C) (Tympanic)   Ht 6' (1.829 m)   Wt 92.8 kg (204 lb 9.6 oz)   SpO2 98%   BMI 27.75 kg/m²      Physical Exam  Vitals and nursing note reviewed.   Constitutional:       General: He is not in acute distress.     Appearance: He is well-developed.   HENT:      Head: Normocephalic and atraumatic.   Eyes:      Conjunctiva/sclera: Conjunctivae normal.   Cardiovascular:      Rate and Rhythm: Normal rate and regular rhythm.      Heart sounds: No murmur heard.  Pulmonary:      Effort: Pulmonary effort is  normal. No respiratory distress.      Breath sounds: Normal breath sounds.   Musculoskeletal:         General: No swelling.      Cervical back: Neck supple.   Skin:     General: Skin is warm and dry.   Neurological:      Mental Status: He is alert.   Psychiatric:         Mood and Affect: Mood normal.             Component      Latest Ref Rng 11/15/2024   Sodium      135 - 147 mmol/L 144    Potassium      3.5 - 5.3 mmol/L 4.1    Chloride      96 - 108 mmol/L 107    Carbon Dioxide      21 - 32 mmol/L 27    ANION GAP      4 - 13 mmol/L 10    BUN      5 - 25 mg/dL 21    Creatinine      0.60 - 1.30 mg/dL 0.90    GLUCOSE, FASTING      65 - 99 mg/dL 111 (H)    Calcium      8.4 - 10.2 mg/dL 9.5    AST      13 - 39 U/L 22    ALT      7 - 52 U/L 15    ALK PHOS      34 - 104 U/L 65    Total Protein      6.4 - 8.4 g/dL 7.9    Albumin      3.5 - 5.0 g/dL 4.8    Total Bilirubin      0.20 - 1.00 mg/dL 0.34    GFR, Calculated      ml/min/1.73sq m 93    Cholesterol      See Comment mg/dL 112    Triglycerides      See Comment mg/dL 120    HDL      >=40 mg/dL 26 (L)    LDL Calculated      0 - 100 mg/dL 62    Non-HDL Cholesterol      mg/dl 86    EXT Creatinine Urine      Reference range not established. mg/dL 84.4    Albumin,U,Random      <20.0 mg/L 54.1 (H)    Albumin Creat Ratio      0 - 30 mg/g creatinine 64 (H)    Hemoglobin A1C      Normal 4.0-5.6%; PreDiabetic 5.7-6.4%; Diabetic >=6.5%; Glycemic control for adults with diabetes <7.0% % 6.7 (H)    eAG, EST AVG Glucose      mg/dl 146    VITAMIN D, 25-HYDROXY      30.0 - 100.0 ng/mL 34.7

## 2024-11-21 ENCOUNTER — TELEPHONE (OUTPATIENT)
Dept: ADMINISTRATIVE | Facility: OTHER | Age: 59
End: 2024-11-21

## 2024-11-21 NOTE — LETTER
Diabetic Eye Exam Form    Date Requested: 24  Patient: Fidle Calderon  Patient : 1965   Referring Provider: Jani Fernandez MD      DIABETIC Eye Exam Date _______________________________      Type of Exam MUST be documented for Diabetic Eye Exams. Please CHECK ONE.     Retinal Exam       Dilated Retinal Exam       OCT       Optomap-Iris Exam      Fundus Photography       Left Eye - Please check Retinopathy or No Retinopathy        Exam did show retinopathy    Exam did not show retinopathy       Right Eye - Please check Retinopathy or No Retinopathy       Exam did show retinopathy    Exam did not show retinopathy       Comments __________________________________________________________    Practice Providing Exam ______________________________________________    Exam Performed By (print name) _______________________________________      Provider Signature ___________________________________________________      These reports are needed for  compliance.  Please fax this completed form and a copy of the Diabetic Eye Exam report to our office located at [address] [place] [address] as soon as possible via Fax [phone] attention Gertrude: Phone [phone]  We thank you for your assistance in treating our mutual patient.

## 2024-11-21 NOTE — ASSESSMENT & PLAN NOTE
We will consider to increase Irbesartan if microalbuminuria did not improved, he is already on SGLT-2 I    Orders:    Comprehensive metabolic panel; Future    Albumin / creatinine urine ratio; Future

## 2024-11-21 NOTE — TELEPHONE ENCOUNTER
----- Message from Maura PINEDO sent at 11/20/2024  3:26 PM EST -----  11/20/24 3:26 PM    Hello, our patient Fidel Calderon has had Diabetic Eye Exam completed/performed. Please assist in updating the patient chart by making an External outreach to [facility] located in [place]. The date of service is spring of 2024.    Thank you,  Maura Valderrama MA  [facility] [place]

## 2024-11-21 NOTE — ASSESSMENT & PLAN NOTE
Controlled, blood pressure of 130/74, the goal is < 130/70.  Continue Irbesartan, we may consider increasing the dose if microalbuminuria did not improve.     Orders:    Comprehensive metabolic panel; Future    Albumin / creatinine urine ratio; Future

## 2024-11-22 NOTE — TELEPHONE ENCOUNTER
Upon review of the In Basket request and the patient's chart, initial outreach has been made via fax to facility. Please see Contacts section for details.     Thank you  Gertrude Coburn MA

## 2024-11-25 ENCOUNTER — HOSPITAL ENCOUNTER (OUTPATIENT)
Dept: NON INVASIVE DIAGNOSTICS | Facility: HOSPITAL | Age: 59
Discharge: HOME | End: 2024-11-25
Payer: COMMERCIAL

## 2024-11-25 VITALS
HEIGHT: 72 IN | SYSTOLIC BLOOD PRESSURE: 138 MMHG | WEIGHT: 204 LBS | DIASTOLIC BLOOD PRESSURE: 74 MMHG | BODY MASS INDEX: 27.63 KG/M2 | HEART RATE: 76 BPM

## 2024-11-25 DIAGNOSIS — R06.02 SHORTNESS OF BREATH: ICD-10-CM

## 2024-11-25 LAB
AORTIC ROOT: 3.7 CM
APICAL FOUR CHAMBER EJECTION FRACTION: 59 %
ASCENDING AORTA: 3.8 CM
BSA FOR ECHO PROCEDURE: 2.15 M2
E WAVE DECELERATION TIME: 167 MS
E/A RATIO: 0.78
FRACTIONAL SHORTENING: 37 (ref 28–44)
INTERVENTRICULAR SEPTUM IN DIASTOLE (PARASTERNAL SHORT AXIS VIEW): 0.8 CM
INTERVENTRICULAR SEPTUM: 0.8 CM (ref 0.6–1.1)
LAAS-AP2: 24 CM2
LAAS-AP4: 16 CM2
LEFT ATRIUM SIZE: 4 CM
LEFT ATRIUM VOLUME (MOD BIPLANE): 64 ML
LEFT ATRIUM VOLUME INDEX (MOD BIPLANE): 29.8 ML/M2
LEFT INTERNAL DIMENSION IN SYSTOLE: 3.1 CM (ref 2.1–4)
LEFT VENTRICULAR INTERNAL DIMENSION IN DIASTOLE: 4.9 CM (ref 3.5–6)
LEFT VENTRICULAR POSTERIOR WALL IN END DIASTOLE: 1 CM
LEFT VENTRICULAR STROKE VOLUME: 76 ML
LVSV (TEICH): 76 ML
MV E'TISSUE VEL-LAT: 17 CM/S
MV E'TISSUE VEL-SEP: 11 CM/S
MV PEAK A VEL: 0.77 M/S
MV PEAK E VEL: 60 CM/S
MV STENOSIS PRESSURE HALF TIME: 48 MS
MV VALVE AREA P 1/2 METHOD: 4.58
RA PRESSURE ESTIMATED: 5 MMHG
RIGHT ATRIUM AREA SYSTOLE A4C: 17.4 CM2
RIGHT VENTRICLE ID DIMENSION: 4 CM
SL CV LEFT ATRIUM LENGTH A2C: 6.1 CM
SL CV LV EF: 60
SL CV PED ECHO LEFT VENTRICLE DIASTOLIC VOLUME (MOD BIPLANE) 2D: 113 ML
SL CV PED ECHO LEFT VENTRICLE SYSTOLIC VOLUME (MOD BIPLANE) 2D: 37 ML
TRICUSPID ANNULAR PLANE SYSTOLIC EXCURSION: 3 CM

## 2024-11-25 PROCEDURE — 93306 TTE W/DOPPLER COMPLETE: CPT | Performed by: INTERNAL MEDICINE

## 2024-11-25 PROCEDURE — 93306 TTE W/DOPPLER COMPLETE: CPT

## 2024-11-25 NOTE — TELEPHONE ENCOUNTER
Upon review of the In Basket request we have found as a result of outreach that patient did not have the requested item(s) completed.     Any additional questions or concerns should be emailed to the Practice Liaisons via the appropriate education email address, please do not reply via In Basket.    Thank you  Gertrude Coburn MA   [facility]

## 2024-11-26 ENCOUNTER — RESULTS FOLLOW-UP (OUTPATIENT)
Dept: CARDIOLOGY CLINIC | Facility: CLINIC | Age: 59
End: 2024-11-26

## 2024-11-26 NOTE — TELEPHONE ENCOUNTER
----- Message from NARGIS Vázquez sent at 11/25/2024 11:07 AM EST -----  Please call Fidel and inform  him TTE showed small septal aneurysm. Continue on current medications.    Thank you     Spoke with pt re: TTE rslts. Continue on current medications.

## 2024-11-27 ENCOUNTER — OFFICE VISIT (OUTPATIENT)
Dept: OBGYN CLINIC | Facility: MEDICAL CENTER | Age: 59
End: 2024-11-27
Payer: OTHER MISCELLANEOUS

## 2024-11-27 VITALS
HEART RATE: 70 BPM | SYSTOLIC BLOOD PRESSURE: 127 MMHG | WEIGHT: 204 LBS | DIASTOLIC BLOOD PRESSURE: 76 MMHG | HEIGHT: 72 IN | BODY MASS INDEX: 27.63 KG/M2

## 2024-11-27 DIAGNOSIS — G56.01 CARPAL TUNNEL SYNDROME ON RIGHT: ICD-10-CM

## 2024-11-27 DIAGNOSIS — Z98.890 S/P CARPAL TUNNEL RELEASE: ICD-10-CM

## 2024-11-27 DIAGNOSIS — G56.21 CUBITAL TUNNEL SYNDROME ON RIGHT: ICD-10-CM

## 2024-11-27 DIAGNOSIS — M18.11 ARTHRITIS OF CARPOMETACARPAL (CMC) JOINT OF RIGHT THUMB: Primary | ICD-10-CM

## 2024-11-27 DIAGNOSIS — Z98.890 S/P CUBITAL TUNNEL RELEASE: ICD-10-CM

## 2024-11-27 PROCEDURE — 99214 OFFICE O/P EST MOD 30 MIN: CPT | Performed by: ORTHOPAEDIC SURGERY

## 2024-11-27 RX ORDER — CEFAZOLIN SODIUM 2 G/50ML
2000 SOLUTION INTRAVENOUS ONCE
OUTPATIENT
Start: 2024-11-27 | End: 2024-11-27

## 2024-11-27 RX ORDER — ACETAMINOPHEN 325 MG/1
975 TABLET ORAL ONCE
OUTPATIENT
Start: 2024-11-27 | End: 2024-11-27

## 2024-11-27 NOTE — H&P
HAND & UPPER EXTREMITY OFFICE VISIT   Referred By:  No referring provider defined for this encounter.      Chief Complaint:     Left elbow and wrist postop    Surgery:  Release Carpal Tunnel - Left - Left and Release Cubital Tunnel - Left, With Submuscular Transposition - Left.    History of Present Illness:   Patient is LHD. Patient presents now 5 weeks status post the above surgery. Since last visit he reports his pain is well controlled. He reports numbness has nearly resolved compared to preoperatively. he reports he was shoveling yesterday and his forearm is little more sore and elbow swollen. He reports suture abscess at proximal incision has improved significantly following the oral antibiotics we called in last week.  He reports his medial epicondyle is more prominent than the contralateral side.  This is not bothersome but he is wondering if this will be like this forever.  Denies fevers or chills.    His main complaint today is, he reports persistent 7/10 right thumb pain worse with gripping and pinching. He reports numbness and tingling worse in the small and ring fingers but also has numbness and tingling into the thumb, index and long fingers. He is interested in surgery as previously discussed for January 2024.    Past Medical History:  Past Medical History:   Diagnosis Date    Cardiac disease     Diabetes mellitus (HCC)     Hyperlipidemia     Hypertension     MI (myocardial infarction) (HCC)     Sleep apnea      Past Surgical History:   Procedure Laterality Date    CARDIAC CATHETERIZATION N/A 03/06/2023    Procedure: Cardiac catheterization;  Surgeon: Fernando Panda MD;  Location: [facility];  Service: Cardiology    CARDIAC CATHETERIZATION N/A 03/06/2023    Procedure: Cardiac pci;  Surgeon: Fernando Panda MD;  Location: [facility];  Service: Cardiology    CARDIAC CATHETERIZATION N/A 03/06/2023    Procedure: Cardiac Coronary Angiogram;  Surgeon: Fernando Panda MD;  [facility]: [facility];  Service: Cardiology    CARDIAC CATHETERIZATION Left 2024    Procedure: Cardiac catheterization;  Surgeon: Bob Fraser MD;  Location: [facility];  Service: Cardiology    CARDIAC CATHETERIZATION N/A 2024    Procedure: Cardiac pci;  Surgeon: Bob Fraser MD;  Location: [facility];  Service: Cardiology    CARDIAC SURGERY      COLONOSCOPY      CORONARY ANGIOPLASTY WITH STENT PLACEMENT      DEBRIDEMENT TENNIS ELBOW      HERNIA REPAIR      KNEE ARTHROSCOPY      DC NEUROPLASTY &/TRANSPOS MEDIAN NRV CARPAL TUNNE Left 10/21/2024    Procedure: RELEASE CARPAL TUNNEL - LEFT;  Surgeon: Papo Castillo MD;  Location: [facility];  Service: Orthopedics    DC NEUROPLASTY &/TRANSPOSITION ULNAR NERVE ELBOW Left 10/21/2024    Procedure: RELEASE CUBITAL TUNNEL - LEFT, WITH SUBMUSCULAR TRANSPOSITION;  Surgeon: Papo Castillo MD;  Location: [facility];  Service: Orthopedics    SHOULDER ARTHROSCOPY      VASECTOMY       Family History   Problem Relation Age of Onset    Hypertension Maternal Grandfather     Diabetes type I Maternal Grandmother     Hypertension Paternal Grandfather     Diabetes type II Paternal Grandmother      Social History     Socioeconomic History    Marital status: /Civil Union     Spouse name: Not on file    Number of children: Not on file    Years of education: Not on file    Highest education level: Not on file   Occupational History    Not on file   Tobacco Use    Smoking status: Former     Current packs/day: 0.00     Average packs/day: 0.5 packs/day for 28.0 years (14.0 ttl pk-yrs)     Types: Cigarettes     Start date: 1977     Quit date: 2005     Years since quittin.9    Smokeless tobacco: Former     Types: Chew     Quit date: 1985   Vaping Use    Vaping status: Never Used   Substance and Sexual Activity    Alcohol use: Yes     Alcohol/week: 2.0 standard drinks of alcohol     Types: 2 Shots of liquor per week     Comment: 2  drinks weekly    Drug use: No    Sexual activity: Yes     Birth control/protection: Post-menopausal   Other Topics Concern    Not on file   Social History Narrative    Not on file     Social Drivers of Health     Financial Resource Strain: Not on file   Food Insecurity: Not on file   Transportation Needs: Not on file   Physical Activity: Not on file   Stress: Not on file   Social Connections: Not on file   Intimate Partner Violence: Not on file   Housing Stability: Not on file     Scheduled Meds:  Continuous Infusions:No current facility-administered medications for this visit.    PRN Meds:.  Allergies   Allergen Reactions    Banana - Food Allergy Throat Swelling    Tetanus Toxoid Other (See Comments)       Physical Examination:    /76   Pulse 70   Ht 6' (1.829 m)   Wt 92.5 kg (204 lb)   BMI 27.67 kg/m²     Gen: A&Ox3, NAD    Right Upper Extremity:  Skin CDI  No obvious deformity of the shoulder, arm, elbow, forearm, wrist, hand  Swelling Negative  TTP thumb CMC, thenar muscles  +pressure shear  Non-tender first dorsal compartment, neg finkelsteins  Sensation intact to light touch in the axillary median, ulnar, and radial nerve distributions  Full composite fist  2+RP  Positive Tinel's at the carpal tunnel  Positive Durkan's  Negative Tinel's at the cubital tunnel   Positive elbow flexion compression test       Left Upper Extremity:  Dressing clean and dry, removed  Incision healing well. Suture abscess at proximal incision, scabbed over, no drainage.  Mild tenderness at suture abscess.  TTP around elbow incision  Sensation intact to light touch in the axillary median, ulnar, and radial nerve distributions  Full digital and elbow ROM  Warm, well-perfused digits  Cap refill <2s      Studies:  7/19/24: Radiographs of the bilateral hands, multiple views, demonstrate degenerative changes at the bilateral thumb CMC joints with radial subluxation of the metacarpals on the trapeziums and osteophyte formation,  loss of joint space. No significant degenerative changes in the radiocarpal joint.     8/19/24: MSK US of the bilateral wrists and elbows shows evidence of carpal and cubital tunnel syndrome.      RIGHT SIDE: Median nerve cross sectional area distal forearm (CSAp): 7.6 sq mm. Maximum median nerve cross sectional area within carpal tunnel (CSAc): 13.6 sq mm. Delta CSA (CSAc-CSAp): 6 sq mm. Wrist to Forearm ratio (WFR ratio) (CSAc/CSAp): 1.8. No hypervascularity.     Ulnar nerve cross sectional area proximally: 6.4 sq mm. Maximum ulnar nerve cross sectional area within or near the cubital tunnel: 14 sq mm. Ulnar nerve cross sectional area distal to cubital tunnel: 4.5 sq mm. Maximal ulnar nerve/proximal cross sectional ratio: 2.2. No evidence of ulnar nerve dislocation from the cubital tunnel on dynamic flexion-extension evaluation. No accessory anconeus epitrochlearis muscle.            Assessment and Plan:  1. Arthritis of carpometacarpal (CMC) joint of right thumb  Case request operating room: Right thumb carpometacarpal joint arthroplasty with TightRope suspension, RELEASE CARPAL TUNNEL - Right, RELEASE CUBITAL TUNNEL - Right, with possible anterior transposition    Case request operating room: Right thumb carpometacarpal joint arthroplasty with TightRope suspension, RELEASE CARPAL TUNNEL - Right, RELEASE CUBITAL TUNNEL - Right, with possible anterior transposition      2. Carpal tunnel syndrome on right  Case request operating room: Right thumb carpometacarpal joint arthroplasty with TightRope suspension, RELEASE CARPAL TUNNEL - Right, RELEASE CUBITAL TUNNEL - Right, with possible anterior transposition    Case request operating room: Right thumb carpometacarpal joint arthroplasty with TightRope suspension, RELEASE CARPAL TUNNEL - Right, RELEASE CUBITAL TUNNEL - Right, with possible anterior transposition      3. Cubital tunnel syndrome on right  Case request operating room: Right thumb carpometacarpal joint  arthroplasty with TightRope suspension, RELEASE CARPAL TUNNEL - Right, RELEASE CUBITAL TUNNEL - Right, with possible anterior transposition    Case request operating room: Right thumb carpometacarpal joint arthroplasty with TightRope suspension, RELEASE CARPAL TUNNEL - Right, RELEASE CUBITAL TUNNEL - Right, with possible anterior transposition      4. S/P cubital tunnel release      Left      5. S/P carpal tunnel release      Left          59 y.o. male presents 5 weeks status post Release Carpal Tunnel - Left - Left and Release Cubital Tunnel - Left, With Submuscular Transposition - Left. DOS 10/21/2024. He did have a suture abscess that required antibtiotic treatment that has resolved. He completed his antibiotic course this morning. His numbness in the left hand has resolved. He is overall doing well post-operatively and would like to return to work on 12/09/2024. Work note provided.    In regards to his main complain of right thumb pain and right hand numbness and tingling, consistent with right thumb CMC arthritis and carpal and cubital tunnel, he wishes to proceed with surgery. We reviewed the risks of surgery including infection, bleeding, injury to nearby structures including the nerve, poor wound healing, pillar pain, stiffness, CRPS, and incomplete resolution or recurrence of symptoms. We reviewed the details of the procedure and the anticipated recovery period. All questions answered to patient's satisfaction. Written consent obtained in the office today.     right thumb CMC arthroplasty with tight rope suspension, right carpal tunnel release and right cubital tunnel release with possible anterior transposition.    Regional anesthesia with sedation    I recommend they follow up Return for Recheck postop .he expressed understanding of the plan and agreed. We encouraged them to contact our office with any questions or concerns.         Papo Castillo MD  Hand and Upper Extremity Surgery    Scribe  Attestation      I,:  Hanna Glover am acting as a scribe while in the presence of the attending physician.:       I,:  Papo Castillo MD personally performed the services described in this documentation    as scribed in my presence.:               *This note was dictated using Dragon voice recognition software. Please excuse any word substitutions or errors.*

## 2024-11-27 NOTE — PROGRESS NOTES
HAND & UPPER EXTREMITY OFFICE VISIT   Referred By:  No referring provider defined for this encounter.      Chief Complaint:     Left elbow and wrist postop    Surgery:  Release Carpal Tunnel - Left - Left and Release Cubital Tunnel - Left, With Submuscular Transposition - Left.    History of Present Illness:   Patient is LHD. Patient presents now 5 weeks status post the above surgery. Since last visit he reports his pain is well controlled. He reports numbness has nearly resolved compared to preoperatively. he reports he was shoveling yesterday and his forearm is little more sore and elbow swollen. He reports suture abscess at proximal incision has improved significantly following the oral antibiotics we called in last week.  He reports his medial epicondyle is more prominent than the contralateral side.  This is not bothersome but he is wondering if this will be like this forever.  Denies fevers or chills.    His main complaint today is, he reports persistent 7/10 right thumb pain worse with gripping and pinching. He reports numbness and tingling worse in the small and ring fingers but also has numbness and tingling into the thumb, index and long fingers. He is interested in surgery as previously discussed for January 2024.    Past Medical History:  Past Medical History:   Diagnosis Date    Cardiac disease     Diabetes mellitus (HCC)     Hyperlipidemia     Hypertension     MI (myocardial infarction) (HCC)     Sleep apnea      Past Surgical History:   Procedure Laterality Date    CARDIAC CATHETERIZATION N/A 03/06/2023    Procedure: Cardiac catheterization;  Surgeon: Fernando Panda MD;  Location: [facility];  Service: Cardiology    CARDIAC CATHETERIZATION N/A 03/06/2023    Procedure: Cardiac pci;  Surgeon: Fernando Panda MD;  [facility];  Service: Cardiology    CARDIAC CATHETERIZATION N/A 03/06/2023    Procedure: Cardiac Coronary Angiogram;  Surgeon: Fernando Panda MD;  [facility];  Service: Cardiology    CARDIAC CATHETERIZATION Left 2024    Procedure: Cardiac catheterization;  Surgeon: Bob Fraser MD;  Location: [facility];  Service: Cardiology    CARDIAC CATHETERIZATION N/A 2024    Procedure: Cardiac pci;  Surgeon: Bob Fraser MD;  Location: [facility];  Service: Cardiology    CARDIAC SURGERY      COLONOSCOPY      CORONARY ANGIOPLASTY WITH STENT PLACEMENT      DEBRIDEMENT TENNIS ELBOW      HERNIA REPAIR      KNEE ARTHROSCOPY      SC NEUROPLASTY &/TRANSPOS MEDIAN NRV CARPAL TUNNE Left 10/21/2024    Procedure: RELEASE CARPAL TUNNEL - LEFT;  Surgeon: Papo Castillo MD;  Location: [facility];  Service: Orthopedics    SC NEUROPLASTY &/TRANSPOSITION ULNAR NERVE ELBOW Left 10/21/2024    Procedure: RELEASE CUBITAL TUNNEL - LEFT, WITH SUBMUSCULAR TRANSPOSITION;  Surgeon: Papo Castillo MD;  Location: [facility];  Service: Orthopedics    SHOULDER ARTHROSCOPY      VASECTOMY       Family History   Problem Relation Age of Onset    Hypertension Maternal Grandfather     Diabetes type I Maternal Grandmother     Hypertension Paternal Grandfather     Diabetes type II Paternal Grandmother      Social History     Socioeconomic History    Marital status: /Civil Union     Spouse name: Not on file    Number of children: Not on file    Years of education: Not on file    Highest education level: Not on file   Occupational History    Not on file   Tobacco Use    Smoking status: Former     Current packs/day: 0.00     Average packs/day: 0.5 packs/day for 28.0 years (14.0 ttl pk-yrs)     Types: Cigarettes     Start date: 1977     Quit date: 2005     Years since quittin.9    Smokeless tobacco: Former     Types: Chew     Quit date: 1985   Vaping Use    Vaping status: Never Used   Substance and Sexual Activity    Alcohol use: Yes     Alcohol/week: 2.0 standard drinks of alcohol     Types: 2 Shots of liquor per week     Comment: 2  drinks weekly    Drug use: No    Sexual activity: Yes     Birth control/protection: Post-menopausal   Other Topics Concern    Not on file   Social History Narrative    Not on file     Social Drivers of Health     Financial Resource Strain: Not on file   Food Insecurity: Not on file   Transportation Needs: Not on file   Physical Activity: Not on file   Stress: Not on file   Social Connections: Not on file   Intimate Partner Violence: Not on file   Housing Stability: Not on file     Scheduled Meds:  Continuous Infusions:No current facility-administered medications for this visit.    PRN Meds:.  Allergies   Allergen Reactions    Banana - Food Allergy Throat Swelling    Tetanus Toxoid Other (See Comments)       Physical Examination:    /76   Pulse 70   Ht 6' (1.829 m)   Wt 92.5 kg (204 lb)   BMI 27.67 kg/m²     Gen: A&Ox3, NAD    Right Upper Extremity:  Skin CDI  No obvious deformity of the shoulder, arm, elbow, forearm, wrist, hand  Swelling Negative  TTP thumb CMC, thenar muscles  +pressure shear  Non-tender first dorsal compartment, neg finkelsteins  Sensation intact to light touch in the axillary median, ulnar, and radial nerve distributions  Full composite fist  2+RP  Positive Tinel's at the carpal tunnel  Positive Durkan's  Negative Tinel's at the cubital tunnel   Positive elbow flexion compression test       Left Upper Extremity:  Dressing clean and dry, removed  Incision healing well. Suture abscess at proximal incision, scabbed over, no drainage.  Mild tenderness at suture abscess.  TTP around elbow incision  Sensation intact to light touch in the axillary median, ulnar, and radial nerve distributions  Full digital and elbow ROM  Warm, well-perfused digits  Cap refill <2s      Studies:  7/19/24: Radiographs of the bilateral hands, multiple views, demonstrate degenerative changes at the bilateral thumb CMC joints with radial subluxation of the metacarpals on the trapeziums and osteophyte formation,  loss of joint space. No significant degenerative changes in the radiocarpal joint.     8/19/24: MSK US of the bilateral wrists and elbows shows evidence of carpal and cubital tunnel syndrome.      RIGHT SIDE: Median nerve cross sectional area distal forearm (CSAp): 7.6 sq mm. Maximum median nerve cross sectional area within carpal tunnel (CSAc): 13.6 sq mm. Delta CSA (CSAc-CSAp): 6 sq mm. Wrist to Forearm ratio (WFR ratio) (CSAc/CSAp): 1.8. No hypervascularity.     Ulnar nerve cross sectional area proximally: 6.4 sq mm. Maximum ulnar nerve cross sectional area within or near the cubital tunnel: 14 sq mm. Ulnar nerve cross sectional area distal to cubital tunnel: 4.5 sq mm. Maximal ulnar nerve/proximal cross sectional ratio: 2.2. No evidence of ulnar nerve dislocation from the cubital tunnel on dynamic flexion-extension evaluation. No accessory anconeus epitrochlearis muscle.            Assessment and Plan:  1. Arthritis of carpometacarpal (CMC) joint of right thumb  Case request operating room: Right thumb carpometacarpal joint arthroplasty with TightRope suspension, RELEASE CARPAL TUNNEL - Right, RELEASE CUBITAL TUNNEL - Right, with possible anterior transposition    Case request operating room: Right thumb carpometacarpal joint arthroplasty with TightRope suspension, RELEASE CARPAL TUNNEL - Right, RELEASE CUBITAL TUNNEL - Right, with possible anterior transposition      2. Carpal tunnel syndrome on right  Case request operating room: Right thumb carpometacarpal joint arthroplasty with TightRope suspension, RELEASE CARPAL TUNNEL - Right, RELEASE CUBITAL TUNNEL - Right, with possible anterior transposition    Case request operating room: Right thumb carpometacarpal joint arthroplasty with TightRope suspension, RELEASE CARPAL TUNNEL - Right, RELEASE CUBITAL TUNNEL - Right, with possible anterior transposition      3. Cubital tunnel syndrome on right  Case request operating room: Right thumb carpometacarpal joint  arthroplasty with TightRope suspension, RELEASE CARPAL TUNNEL - Right, RELEASE CUBITAL TUNNEL - Right, with possible anterior transposition    Case request operating room: Right thumb carpometacarpal joint arthroplasty with TightRope suspension, RELEASE CARPAL TUNNEL - Right, RELEASE CUBITAL TUNNEL - Right, with possible anterior transposition      4. S/P cubital tunnel release      Left      5. S/P carpal tunnel release      Left          59 y.o. male presents 5 weeks status post Release Carpal Tunnel - Left - Left and Release Cubital Tunnel - Left, With Submuscular Transposition - Left. DOS 10/21/2024. He did have a suture abscess that required antibtiotic treatment that has resolved. He completed his antibiotic course this morning. His numbness in the left hand has resolved. He is overall doing well post-operatively and would like to return to work on 12/09/2024. Work note provided.    In regards to his main complain of right thumb pain and right hand numbness and tingling, consistent with right thumb CMC arthritis and carpal and cubital tunnel, he wishes to proceed with surgery. We reviewed the risks of surgery including infection, bleeding, injury to nearby structures including the nerve, poor wound healing, pillar pain, stiffness, CRPS, and incomplete resolution or recurrence of symptoms. We reviewed the details of the procedure and the anticipated recovery period. All questions answered to patient's satisfaction. Written consent obtained in the office today.     right thumb CMC arthroplasty with tight rope suspension, right carpal tunnel release and right cubital tunnel release with possible anterior transposition.    Regional anesthesia with sedation    I recommend they follow up Return for Recheck postop .he expressed understanding of the plan and agreed. We encouraged them to contact our office with any questions or concerns.         Papo Castillo MD  Hand and Upper Extremity Surgery    Scribe  Attestation      I,:  Hanna Glover am acting as a scribe while in the presence of the attending physician.:       I,:  Papo Castillo MD personally performed the services described in this documentation    as scribed in my presence.:               *This note was dictated using Dragon voice recognition software. Please excuse any word substitutions or errors.*

## 2024-11-27 NOTE — LETTER
November 27, 2024     Patient: Fidel Calderon  YOB: 1965  Date of Visit: 11/27/2024      To Whom it May Concern:    Fidel Calderon is under my professional care. Fidel CALVIN was seen in my office on 11/27/2024. Fidel CALVIN may return to work on 12/09/2024.    If you have any questions or concerns, please don't hesitate to call.         Sincerely,          Papo Castillo MD        CC: No Recipients

## 2024-12-19 DIAGNOSIS — I25.10 CORONARY ARTERY DISEASE INVOLVING NATIVE CORONARY ARTERY OF NATIVE HEART WITHOUT ANGINA PECTORIS: ICD-10-CM

## 2024-12-19 DIAGNOSIS — E78.00 HYPERCHOLESTEROLEMIA: ICD-10-CM

## 2024-12-19 DIAGNOSIS — E11.69 TYPE 2 DIABETES MELLITUS WITH OTHER SPECIFIED COMPLICATION, WITHOUT LONG-TERM CURRENT USE OF INSULIN (HCC): ICD-10-CM

## 2024-12-20 RX ORDER — ROSUVASTATIN CALCIUM 40 MG/1
40 TABLET, COATED ORAL DAILY
Qty: 90 TABLET | Refills: 1 | Status: SHIPPED | OUTPATIENT
Start: 2024-12-20

## 2024-12-20 RX ORDER — METOPROLOL SUCCINATE 25 MG/1
25 TABLET, EXTENDED RELEASE ORAL DAILY
Qty: 90 TABLET | Refills: 1 | Status: SHIPPED | OUTPATIENT
Start: 2024-12-20

## 2024-12-27 ENCOUNTER — ANESTHESIA EVENT (OUTPATIENT)
Age: 59
End: 2024-12-27
Payer: OTHER MISCELLANEOUS

## 2025-01-02 NOTE — PRE-PROCEDURE INSTRUCTIONS
Pre-Surgery Instructions:   Medication Instructions    acetaminophen (TYLENOL) 500 mg tablet Uses PRN- OK to take day of surgery    amLODIPine (NORVASC) 5 mg tablet Take day of surgery.    aspirin (ECOTRIN LOW STRENGTH) 81 mg EC tablet Instructions provided by MD-Cardiac Clearance Pool-message to give instructions    Cholecalciferol (VITAMIN D) 2000 units CAPS Stop taking 7 days prior to surgery.    Empagliflozin (Jardiance) 25 MG TABS Stop taking 4 days prior to surgery.Last dose 1/8/2025    irbesartan (AVAPRO) 75 mg tablet Hold day of surgery.    metFORMIN (GLUCOPHAGE) 1000 MG tablet Hold day of surgery.    metoprolol succinate (TOPROL-XL) 25 mg 24 hr tablet Take day of surgery.    nitroglycerin (NITROSTAT) 0.4 mg SL tablet Uses PRN- OK to take day of surgery    NON FORMULARY Stop taking 7 days prior to surgery.    rosuvastatin (CRESTOR) 40 MG tablet Take night before surgery    semaglutide, 1 mg/dose, (Ozempic, 1 MG/DOSE,) 4 mg/3 mL injection pen Stop taking 7 days prior to surgery.-Last dose 1/5/2025    tadalafil (CIALIS) 10 MG tablet Uses PRN- DO NOT take day of surgery    ticagrelor (BRILINTA) 90 MG Instructions provided by MD-Cardiac Clearance Pool-message to give instructions    zolpidem (AMBIEN) 10 mg tablet Uses PRN- OK to take day of surgery      Medication instructions for day surgery reviewed. Please use only a sip of water to take your instructed medications. Avoid all over the counter vitamins, supplements and NSAIDS for one week prior to surgery per anesthesia guidelines. Tylenol is ok to take as needed.     You will receive a call one business day prior to surgery with an arrival time and hospital directions. If your surgery is scheduled on a Monday, the hospital will be calling you on the Friday prior to your surgery. If you have not heard from anyone by 8pm, please call the hospital supervisor through the hospital  at [phone]. ([place] [phone] or [place] [phone]).    Do  not eat or drink anything after midnight the night before your surgery, including candy, mints, lifesavers, or chewing gum. Do not drink alcohol 24hrs before your surgery. Try not to smoke at least 24hrs before your surgery.       Follow the pre surgery showering instructions as listed in the “My Surgical Experience Booklet” or otherwise provided by your surgeon's office. Do not use a blade to shave the surgical area 1 week before surgery. It is okay to use a clean electric clippers up to 24 hours before surgery. Do not apply any lotions, creams, including makeup, cologne, deodorant, or perfumes after showering on the day of your surgery. Do not use dry shampoo, hair spray, hair gel, or any type of hair products.     No contact lenses, eye make-up, or artificial eyelashes. Remove nail polish, including gel polish, and any artificial, gel, or acrylic nails if possible. Remove all jewelry including rings and body piercing jewelry.     Wear causal clothing that is easy to take on and off. Consider your type of surgery.    Keep any valuables, jewelry, piercings at home. Please bring any specially ordered equipment (sling, braces) if indicated.    Arrange for a responsible person to drive you to and from the hospital on the day of your surgery. Please confirm the visitor policy for the day of your procedure when you receive your phone call with an arrival time.     Call the surgeon's office with any new illnesses, exposures, or additional questions prior to surgery.    Please reference your “My Surgical Experience Booklet” for additional information to prepare for your upcoming surgery.

## 2025-01-07 PROBLEM — M18.11 ARTHRITIS OF CARPOMETACARPAL (CMC) JOINT OF RIGHT THUMB: Status: ACTIVE | Noted: 2025-01-07

## 2025-01-07 NOTE — DISCHARGE INSTR - AVS FIRST PAGE
POST-OPERATIVE INSTRUCTIONS   THUMB CMC ARTHROPLASTY, CARPAL AND CUBITAL TUNNEL RELEASE      You have just undergone a thumb carpometacarpal arthroplasty with carpal and cubital tunnel release by Dr. Castillo in the operating room. It is our wish that your postoperative recovery be as quick and comfortable as possible.  Please carefully review the following items that are important in your recovery.    Pain Control:  After any operation, a certain degree of pain is to be expected.  You have been given a prescription for pain medicine, as well as acetaminophen and/or ibuprofen which will relieve most of your pain but may not relieve all of the pain.  Pain medicine may make you drowsy so please curtail your activities appropriately.  Do not drive while taking pain medicine.     When you go home, please keep your operated arm elevated at all times (above the level of your heart.)  If you do this, your swelling will be diminished and your pain will be diminished as well.    You had a nerve block as part of your surgical anesthesia as well as to aid in your post-operative pain control. This nerve block may last 12-36 hrs after surgery. While your block is working, you will not be able to feel or move your operative arm and hand. Please wear your sling while the block is in place, except for changing clothes or hygiene purposes, to protect your arm. As the block wears off you will start to notice pain in your operative extremity. Please take pain medication as soon as you start to notice the block wearing off. This prevents your pain from becoming unmanageable when the block has completely worn off.      Dressing Care:  The splint that you have on your extremity should remain on and intact until your first postoperative visit with hand therapy.  After surgery, make sure that your dressing is kept dry.  You can take a shower if you cover your arm with a plastic bag, such as a newspaper bag, and use tape or rubber bands. If  your dressing gets wet, take it off,  place Band-Aids on the wound and re-wrap it with sterile dressing that you can get at a local drug store, then please call the office to have a new splint placed.    Weight Bearing:  You should NOT bear weight through your operative extremity. Do not push off using your operative extremity.     If your fingers are not included in the splint, it is ok to move your free fingers as tolerated to prevent stiffness. You may also use your free fingers to assist with light activities of daily living such as putting on clothes, brushing your teeth and eating.       Follow up:  If you do not already have one, please call our office for a follow-up appointment. It is best to call the day after surgery to make an appointment in 10-14 days.  At your first postoperative visit, you will be seen by either Dr. Castillo, his PA;  or one of their associates and the sutures will be removed     You should already have an appointment to see a hand therapist to optimize your functional result. If you do not already have an appointment with the hand therapist, please call ASAP to schedule your post-operative appointment. You will see the hand therapist ~5-7 days after surgery, before you follow up with Dr. Castillo's office. Each of the hand therapists that you may be referred to have received special training in the care of the hand and upper extremity ailments.    [facility]: [phone]    When to Call:  It is normal to see minor staining on the hand surgery dressing after surgery. If there is significant bleeding, you are advised to call the office during regular office hours to have this checked.     If you feel that you have a surgical emergency postoperatively that requires immediate attention, please call [phone]. In addition, any emergency can also be handled by the emergency room.        If you have questions regarding your surgery postop that you feel requires attention, please call the  office.   If this occurs after our regular office hours, there is a message with instructions to talk to the physician on call.

## 2025-01-13 ENCOUNTER — APPOINTMENT (OUTPATIENT)
Age: 60
End: 2025-01-13
Payer: OTHER MISCELLANEOUS

## 2025-01-13 ENCOUNTER — ANESTHESIA (OUTPATIENT)
Age: 60
End: 2025-01-13
Payer: OTHER MISCELLANEOUS

## 2025-01-13 ENCOUNTER — HOSPITAL ENCOUNTER (OUTPATIENT)
Age: 60
Setting detail: OUTPATIENT SURGERY
Discharge: HOME | End: 2025-01-13
Attending: ORTHOPAEDIC SURGERY | Admitting: ORTHOPAEDIC SURGERY
Payer: OTHER MISCELLANEOUS

## 2025-01-13 VITALS
BODY MASS INDEX: 27.79 KG/M2 | HEART RATE: 62 BPM | DIASTOLIC BLOOD PRESSURE: 85 MMHG | OXYGEN SATURATION: 98 % | WEIGHT: 205.2 LBS | RESPIRATION RATE: 16 BRPM | SYSTOLIC BLOOD PRESSURE: 143 MMHG | HEIGHT: 72 IN | TEMPERATURE: 97.5 F

## 2025-01-13 DIAGNOSIS — G56.03 CARPAL TUNNEL SYNDROME, BILATERAL: ICD-10-CM

## 2025-01-13 DIAGNOSIS — G56.23 CUBITAL TUNNEL SYNDROME, BILATERAL: ICD-10-CM

## 2025-01-13 DIAGNOSIS — Z47.89 AFTERCARE FOLLOWING SURGERY OF THE MUSCULOSKELETAL SYSTEM: ICD-10-CM

## 2025-01-13 DIAGNOSIS — M18.11 ARTHRITIS OF CARPOMETACARPAL (CMC) JOINT OF RIGHT THUMB: Primary | ICD-10-CM

## 2025-01-13 LAB
GLUCOSE SERPL-MCNC: 125 MG/DL (ref 65–140)
GLUCOSE SERPL-MCNC: 127 MG/DL (ref 65–140)

## 2025-01-13 PROCEDURE — 73140 X-RAY EXAM OF FINGER(S): CPT

## 2025-01-13 PROCEDURE — 25447 ARTHRP NTRCRP/CRP/MTCR NTRPS: CPT | Performed by: ORTHOPAEDIC SURGERY

## 2025-01-13 PROCEDURE — C1713 ANCHOR/SCREW BN/BN,TIS/BN: HCPCS | Performed by: ORTHOPAEDIC SURGERY

## 2025-01-13 PROCEDURE — NC001 PR NO CHARGE: Performed by: ORTHOPAEDIC SURGERY

## 2025-01-13 PROCEDURE — 64721 CARPAL TUNNEL SURGERY: CPT | Performed by: ORTHOPAEDIC SURGERY

## 2025-01-13 PROCEDURE — 64718 REVISE ULNAR NERVE AT ELBOW: CPT | Performed by: ORTHOPAEDIC SURGERY

## 2025-01-13 PROCEDURE — 82948 REAGENT STRIP/BLOOD GLUCOSE: CPT

## 2025-01-13 DEVICE — IMPL SYS,CMC MINI T-ROPE,1.1 MM
Type: IMPLANTABLE DEVICE | Site: WRIST | Status: FUNCTIONAL
Brand: ARTHREX®

## 2025-01-13 RX ORDER — PROMETHAZINE HYDROCHLORIDE 25 MG/ML
12.5 INJECTION, SOLUTION INTRAMUSCULAR; INTRAVENOUS ONCE AS NEEDED
Status: DISCONTINUED | OUTPATIENT
Start: 2025-01-13 | End: 2025-01-13 | Stop reason: HOSPADM

## 2025-01-13 RX ORDER — ACETAMINOPHEN 325 MG/1
975 TABLET ORAL ONCE
Status: COMPLETED | OUTPATIENT
Start: 2025-01-13 | End: 2025-01-13

## 2025-01-13 RX ORDER — IBUPROFEN 800 MG/1
800 TABLET, FILM COATED ORAL EVERY 8 HOURS PRN
Qty: 30 TABLET | Refills: 0 | Status: SHIPPED | OUTPATIENT
Start: 2025-01-13

## 2025-01-13 RX ORDER — PROPOFOL 10 MG/ML
INJECTION, EMULSION INTRAVENOUS CONTINUOUS PRN
Status: DISCONTINUED | OUTPATIENT
Start: 2025-01-13 | End: 2025-01-13

## 2025-01-13 RX ORDER — METOPROLOL TARTRATE 1 MG/ML
INJECTION, SOLUTION INTRAVENOUS AS NEEDED
Status: DISCONTINUED | OUTPATIENT
Start: 2025-01-13 | End: 2025-01-13

## 2025-01-13 RX ORDER — ACETAMINOPHEN 325 MG/1
650 TABLET ORAL EVERY 6 HOURS PRN
Status: DISCONTINUED | OUTPATIENT
Start: 2025-01-13 | End: 2025-01-13 | Stop reason: HOSPADM

## 2025-01-13 RX ORDER — ONDANSETRON 2 MG/ML
4 INJECTION INTRAMUSCULAR; INTRAVENOUS ONCE AS NEEDED
Status: DISCONTINUED | OUTPATIENT
Start: 2025-01-13 | End: 2025-01-13 | Stop reason: HOSPADM

## 2025-01-13 RX ORDER — CEFAZOLIN SODIUM 2 G/50ML
2000 SOLUTION INTRAVENOUS ONCE
Status: COMPLETED | OUTPATIENT
Start: 2025-01-13 | End: 2025-01-13

## 2025-01-13 RX ORDER — LIDOCAINE HYDROCHLORIDE 10 MG/ML
INJECTION, SOLUTION EPIDURAL; INFILTRATION; INTRACAUDAL; PERINEURAL AS NEEDED
Status: DISCONTINUED | OUTPATIENT
Start: 2025-01-13 | End: 2025-01-13

## 2025-01-13 RX ORDER — MEPERIDINE HYDROCHLORIDE 50 MG/ML
12.5 INJECTION INTRAMUSCULAR; INTRAVENOUS; SUBCUTANEOUS ONCE
Status: DISCONTINUED | OUTPATIENT
Start: 2025-01-13 | End: 2025-01-13 | Stop reason: HOSPADM

## 2025-01-13 RX ORDER — ALBUTEROL SULFATE 0.83 MG/ML
2.5 SOLUTION RESPIRATORY (INHALATION) ONCE AS NEEDED
Status: DISCONTINUED | OUTPATIENT
Start: 2025-01-13 | End: 2025-01-13 | Stop reason: HOSPADM

## 2025-01-13 RX ORDER — FENTANYL CITRATE 50 UG/ML
INJECTION, SOLUTION INTRAMUSCULAR; INTRAVENOUS AS NEEDED
Status: DISCONTINUED | OUTPATIENT
Start: 2025-01-13 | End: 2025-01-13

## 2025-01-13 RX ORDER — MAGNESIUM HYDROXIDE 1200 MG/15ML
LIQUID ORAL AS NEEDED
Status: DISCONTINUED | OUTPATIENT
Start: 2025-01-13 | End: 2025-01-13 | Stop reason: HOSPADM

## 2025-01-13 RX ORDER — SODIUM CHLORIDE, SODIUM LACTATE, POTASSIUM CHLORIDE, CALCIUM CHLORIDE 600; 310; 30; 20 MG/100ML; MG/100ML; MG/100ML; MG/100ML
125 INJECTION, SOLUTION INTRAVENOUS CONTINUOUS
Status: DISCONTINUED | OUTPATIENT
Start: 2025-01-13 | End: 2025-01-13 | Stop reason: HOSPADM

## 2025-01-13 RX ORDER — OXYCODONE HYDROCHLORIDE 5 MG/1
5 TABLET ORAL EVERY 6 HOURS PRN
Status: DISCONTINUED | OUTPATIENT
Start: 2025-01-13 | End: 2025-01-13 | Stop reason: HOSPADM

## 2025-01-13 RX ORDER — SODIUM CHLORIDE, SODIUM LACTATE, POTASSIUM CHLORIDE, CALCIUM CHLORIDE 600; 310; 30; 20 MG/100ML; MG/100ML; MG/100ML; MG/100ML
75 INJECTION, SOLUTION INTRAVENOUS CONTINUOUS
Status: DISCONTINUED | OUTPATIENT
Start: 2025-01-13 | End: 2025-01-13 | Stop reason: HOSPADM

## 2025-01-13 RX ORDER — HYDROMORPHONE HCL/PF 1 MG/ML
0.5 SYRINGE (ML) INJECTION
Status: DISCONTINUED | OUTPATIENT
Start: 2025-01-13 | End: 2025-01-13 | Stop reason: HOSPADM

## 2025-01-13 RX ORDER — ONDANSETRON 4 MG/1
4 TABLET, FILM COATED ORAL EVERY 8 HOURS PRN
Qty: 20 TABLET | Refills: 0 | Status: SHIPPED | OUTPATIENT
Start: 2025-01-13

## 2025-01-13 RX ORDER — FENTANYL CITRATE/PF 50 MCG/ML
25 SYRINGE (ML) INJECTION
Status: DISCONTINUED | OUTPATIENT
Start: 2025-01-13 | End: 2025-01-13 | Stop reason: HOSPADM

## 2025-01-13 RX ORDER — IBUPROFEN 600 MG/1
600 TABLET, FILM COATED ORAL EVERY 6 HOURS PRN
Status: DISCONTINUED | OUTPATIENT
Start: 2025-01-13 | End: 2025-01-13 | Stop reason: HOSPADM

## 2025-01-13 RX ORDER — DOCUSATE SODIUM 100 MG/1
100 CAPSULE, LIQUID FILLED ORAL DAILY PRN
Qty: 10 CAPSULE | Refills: 0 | Status: SHIPPED | OUTPATIENT
Start: 2025-01-13

## 2025-01-13 RX ORDER — PHENYLEPHRINE HCL IN 0.9% NACL 1 MG/10 ML
SYRINGE (ML) INTRAVENOUS AS NEEDED
Status: DISCONTINUED | OUTPATIENT
Start: 2025-01-13 | End: 2025-01-13

## 2025-01-13 RX ORDER — ROPIVACAINE HYDROCHLORIDE 5 MG/ML
INJECTION, SOLUTION EPIDURAL; INFILTRATION; PERINEURAL
Status: COMPLETED | OUTPATIENT
Start: 2025-01-13 | End: 2025-01-13

## 2025-01-13 RX ORDER — OXYCODONE HYDROCHLORIDE 5 MG/1
5 TABLET ORAL EVERY 6 HOURS PRN
Qty: 15 TABLET | Refills: 0 | Status: SHIPPED | OUTPATIENT
Start: 2025-01-13

## 2025-01-13 RX ORDER — ACETAMINOPHEN 500 MG
1000 TABLET ORAL EVERY 8 HOURS PRN
Qty: 60 TABLET | Refills: 0 | Status: SHIPPED | OUTPATIENT
Start: 2025-01-13

## 2025-01-13 RX ORDER — LABETALOL HYDROCHLORIDE 5 MG/ML
5 INJECTION, SOLUTION INTRAVENOUS
Status: DISCONTINUED | OUTPATIENT
Start: 2025-01-13 | End: 2025-01-13 | Stop reason: HOSPADM

## 2025-01-13 RX ORDER — MIDAZOLAM HYDROCHLORIDE 2 MG/2ML
INJECTION, SOLUTION INTRAMUSCULAR; INTRAVENOUS AS NEEDED
Status: DISCONTINUED | OUTPATIENT
Start: 2025-01-13 | End: 2025-01-13

## 2025-01-13 RX ADMIN — PROPOFOL 40 MG: 10 INJECTION, EMULSION INTRAVENOUS at 13:59

## 2025-01-13 RX ADMIN — FENTANYL CITRATE 50 MCG: 50 INJECTION INTRAMUSCULAR; INTRAVENOUS at 15:27

## 2025-01-13 RX ADMIN — ROPIVACAINE HYDROCHLORIDE 30 ML: 5 INJECTION EPIDURAL; INFILTRATION; PERINEURAL at 13:22

## 2025-01-13 RX ADMIN — CEFAZOLIN SODIUM 2000 MG: 2 SOLUTION INTRAVENOUS at 13:44

## 2025-01-13 RX ADMIN — ACETAMINOPHEN 325MG 975 MG: 325 TABLET ORAL at 12:58

## 2025-01-13 RX ADMIN — MIDAZOLAM 2 MG: 1 INJECTION INTRAMUSCULAR; INTRAVENOUS at 13:19

## 2025-01-13 RX ADMIN — SODIUM CHLORIDE, SODIUM LACTATE, POTASSIUM CHLORIDE, AND CALCIUM CHLORIDE: .6; .31; .03; .02 INJECTION, SOLUTION INTRAVENOUS at 15:25

## 2025-01-13 RX ADMIN — PROPOFOL 100 MCG/KG/MIN: 10 INJECTION, EMULSION INTRAVENOUS at 13:44

## 2025-01-13 RX ADMIN — PROPOFOL 50 MG: 10 INJECTION, EMULSION INTRAVENOUS at 13:43

## 2025-01-13 RX ADMIN — Medication 100 MCG: at 15:06

## 2025-01-13 RX ADMIN — METOPROLOL TARTRATE 2.5 MG: 5 INJECTION INTRAVENOUS at 13:58

## 2025-01-13 RX ADMIN — LIDOCAINE HYDROCHLORIDE 50 MG: 10 INJECTION, SOLUTION EPIDURAL; INFILTRATION; INTRACAUDAL; PERINEURAL at 13:43

## 2025-01-13 RX ADMIN — SODIUM CHLORIDE, SODIUM LACTATE, POTASSIUM CHLORIDE, AND CALCIUM CHLORIDE 75 ML/HR: .6; .31; .03; .02 INJECTION, SOLUTION INTRAVENOUS at 12:56

## 2025-01-13 RX ADMIN — FENTANYL CITRATE 50 MCG: 50 INJECTION INTRAMUSCULAR; INTRAVENOUS at 14:06

## 2025-01-13 NOTE — ANESTHESIA POSTPROCEDURE EVALUATION
Post-Op Assessment Note    CV Status:  Stable    Pain management: adequate       Mental Status:  Alert and awake   Hydration Status:  Euvolemic   PONV Controlled:  Controlled   Airway Patency:  Patent     Post Op Vitals Reviewed: Yes    No anethesia notable event occurred.    Staff: Anesthesiologist           Last Filed PACU Vitals:  Vitals Value Taken Time   Temp 97.4 °F (36.3 °C) 01/13/25 1611   Pulse 60 01/13/25 1611   /86 01/13/25 1611   Resp 16 01/13/25 1611   SpO2 97 % 01/13/25 1611       Modified Andrea:     Vitals Value Taken Time   Activity 2 01/13/25 1611   Respiration 2 01/13/25 1611   Circulation 2 01/13/25 1611   Consciousness 2 01/13/25 1611   Oxygen Saturation 2 01/13/25 1611     Modified Andrea Score: 10

## 2025-01-13 NOTE — OP NOTE
OPERATIVE REPORT  PATIENT NAME: Fidel Calderon    :  1965  MRN: [id number]  Pt Location: [facility] OR ROOM 06    SURGERY DATE: 2025    Surgeons and Role:     * Papo Castillo MD - Primary     * Winter Dela Cruz MD - Assisting     * Genevieve Hoover PA-C - Assisting    Preop Diagnosis:  Arthritis of carpometacarpal (CMC) joint of right thumb [M18.11]  Carpal tunnel syndrome on right [G56.01]  Cubital tunnel syndrome on right [G56.21]    Post-Op Diagnosis Codes:     * Arthritis of carpometacarpal (CMC) joint of right thumb [M18.11]     * Carpal tunnel syndrome on right [G56.01]     * Cubital tunnel syndrome on right [G56.21]    Procedure(s):  Right - Right thumb carpometacarpal joint arthroplasty with TightRope suspension  Right - RELEASE CARPAL TUNNEL - Right  Right - RELEASE CUBITAL TUNNEL - Right    Specimen(s):  * No specimens in log *    Estimated Blood Loss:   Minimal    Drains:  * No LDAs found *    Anesthesia Type:   Regional with Sedation    Operative Indications:  Arthritis of carpometacarpal (CMC) joint of right thumb [M18.11]  Carpal tunnel syndrome on right [G56.01]  Cubital tunnel syndrome on right [G56.21]    59-year-old male with right carpal and cubital tunnel syndrome as well as thumb CMC arthritis.  All are affecting his quality of life and has failed nonoperative management.  He did well with a left-sided carpal and cubital tunnel release and elected to proceed with a right-sided carpal tunnel and cubital tunnel release as well as a thumb CMC arthroplasty.    Operative Findings:  Thickened transverse carpal ligament  Compression of the ulnar nerve primarily at the proximal FCU fascia  Degenerative changes and large osteophyte formation at the thumb CMC joint.  There is also a loose body within the thumb CMC joint volar-radially.      Complications:   None    Procedure and Technique:  On the day of surgery, I met the patient in the pre-operative area. The patient was identified by name  and . Once again the risks benefits and alternatives of carpal tunnel release and cubital tunnel release were discussed with the patient. he elected to proceed with surgery.     Patient underwent an anesthetic block. Patient was brought to the OR. They underwent anesthetic sedation. A tourniquet was applied. The operative extremity was prepped and draped in a standard fashion. A timeout was performed prior to incision identifying the name and laterality of the procedure. Preoperative antibiotics were administered.      The operative limb was exsanguinated with a Hemoclear tourniquet.    We started with a carpal tunnel release.  A 2 cm incision was carried out at the intersection of Benson's cardinal line and the radial aspect of the ring finger proximally towards the wrist flexion crease. Skin, subcutaneous tissue, and palmar fascia was incised. The transverse carpal ligament was identified. The distal extent was identified by palmar adipose tissue.    The release began at the distal extent and was carried out sharply through the operative field of view. We continued distally, carefully dissecting and incising the TCL until the palmar arch was identified.     We then used the blunt tipped scissors to dissect and isolate the proximal extent of the TCL and the antebrachial fascia, and performed a smooth release proximally. We directly visualized the intact median nerve at the proximal extent of the release. We checked our release proximally and distally and noted no further compressing fascial or ligamentous bands.     We then turned our attention to the elbow. An 6 cm curvilinear incision was carried out just dorsal to and centered over the medial epicondyle. The subcutaneous tissue was carefully dissected to identify the medial antebrachial cutaneous nerve. This was carefully preserved. The ulnar nerve was then identified proximally at it's exit from the medial intermuscular septum into the posterior compartment.   The septum was then incised completely freeing the proximal aspect of the nerve. We then carefully dissected along the nerve distally, releasing the overlying fascia, including Cordova's ligament. We then moved distally, incising the overlying FCU fascia. The two heads of the FCU were gently split and the underlying deep fascia over the nerve was also released. Motor branches to the FCU were preserved. The point of maximal compression was at proximal edge of the FCU fascia.     The elbow was flexed and extended through a full ROM and the nerve perched but did not dislocate anteriorly.      We then proceeded with the thumb CMC arthroplasty.   A 3cm lazy S shaped incision was made in longitudinal fashion over there first extensor compartment. The superficial radial nerves were identified and protected. The first dorsal compartment was incised and tendons retracted. The radial artery was identified and carefully mobilized and gently retracted. The CMC joint was incised and capsular flaps were raised dorsally and volarly. The scaphoitrapezial and trapeziometacarpal joint were identified. Soft tissue was detached from the trapezium sharply and with the use of a scalpel and right angle clamp.     We then turned out attention to the second metacarpal. 1 cm incision was made along the ulnar aspect of the metacarpal base. The extensor tendon was retracted, and a 1cm area of periosteum was elevated.     The long guidewire was then positioned on the thumb metacarpal and angled towards the second metacarpal.  Fluoroscopy was used to confirm proper postioning. The guidewire for the tightrope suture was then driven across the 4 cortices of the first and second metacarpals. The positioning of the guidewire was confirmed with fluoroscopy.     The tightrope suture was pulled through the guidewire trajectory. One button remained flush against the thumb metacarpal.     The trapezium was then removed with a rongeur and scissors. The  scaphotrapezoid joint was inspected and noted to be free of arthritis. The FCR tendon was intact.     The thumb was held in the appropriate length, abduction and extension and the tightrope suture was tied over the second button on the second metacarpal. Once again positioning was confirmed on fluoroscopy.     Final fluoroscopy was taken to confirm removal of trapezium. A stress radiograph demonstrated no loss of height of the thumb metacarpal. There was not thumb metacarpophalangeal joint hyperextension.    Tourniquet was taken down. The wounds were irrigated. The radial incision was closed in a layered fashion with vicryl for the capsule and nylon for the skin. The second metacarpal incision was closed with nylon suture.     Sterile dressings were placed and a thumb spica splint was applied.     Post-operatively the patient will remain non weight bearing in the splint until the follow up appointment with therapy in 4-5 days.  They will follow up with me as planned within 2 weeks.      I was present for the entire procedure.    Patient Disposition:  PACU              SIGNATURE: Papo Castillo MD  DATE: January 13, 2025  TIME: 3:48 PM

## 2025-01-13 NOTE — INTERVAL H&P NOTE
H&P reviewed. After examining the patient I find no changes in the patients condition since the H&P had been written.    Vitals:    01/13/25 1300   BP: 143/83   Pulse: 69   Resp: 16   Temp:    SpO2: 99%

## 2025-01-13 NOTE — ANESTHESIA POSTPROCEDURE EVALUATION
Post-Op Assessment Note    CV Status:  Stable  Pain Score: 0    Pain management: adequate       Mental Status:  Arousable   Hydration Status:  Euvolemic   PONV Controlled:  Controlled   Airway Patency:  Patent     Post Op Vitals Reviewed: Yes    No anethesia notable event occurred.    Staff: CRNA           Last Filed PACU Vitals:  Vitals Value Taken Time   Temp 97.2    Pulse 62 01/13/25 1536   /69 01/13/25 1536   Resp 12    SpO2 91 % 01/13/25 1536   Vitals shown include unfiled device data.

## 2025-01-13 NOTE — H&P
HAND & UPPER EXTREMITY OFFICE VISIT   Referred By:  No referring provider defined for this encounter.      Chief Complaint:     Right hand pain, numbness/tingling    Surgery:  Release Carpal Tunnel - Left - Left and Release Cubital Tunnel - Left, With Submuscular Transposition - Left.    History of Present Illness:   Patient is LHD. Patient presents now 5 weeks status post the above surgery. Since last visit he reports his pain is well controlled. He reports numbness has nearly resolved compared to preoperatively. he reports he was shoveling yesterday and his forearm is little more sore and elbow swollen. He reports suture abscess at proximal incision has improved significantly following the oral antibiotics we called in last week.  He reports his medial epicondyle is more prominent than the contralateral side.  This is not bothersome but he is wondering if this will be like this forever.  Denies fevers or chills.    His main complaint today is, he reports persistent 7/10 right thumb pain worse with gripping and pinching. He reports numbness and tingling worse in the small and ring fingers but also has numbness and tingling into the thumb, index and long fingers. He is interested in surgery as previously discussed for January 2024.    Past Medical History:  Past Medical History:   Diagnosis Date    Cardiac disease     Diabetes mellitus (HCC)     Hyperlipidemia     Hypertension     MI (myocardial infarction) (HCC)     Sleep apnea      Past Surgical History:   Procedure Laterality Date    CARDIAC CATHETERIZATION N/A 03/06/2023    Procedure: Cardiac catheterization;  Surgeon: Fernando Panda MD;  Location: [facility];  Service: Cardiology    CARDIAC CATHETERIZATION N/A 03/06/2023    Procedure: Cardiac pci;  Surgeon: Fernando Panda MD;  Location: [facility];  Service: Cardiology    CARDIAC CATHETERIZATION N/A 03/06/2023    Procedure: Cardiac Coronary Angiogram;  Surgeon: Fernando Panda MD;   Location: [facility];  Service: Cardiology    CARDIAC CATHETERIZATION Left 2024    Procedure: Cardiac catheterization;  Surgeon: Bob Fraser MD;  Location: [facility];  Service: Cardiology    CARDIAC CATHETERIZATION N/A 2024    Procedure: Cardiac pci;  Surgeon: Bob Fraser MD;  Location: [facility];  Service: Cardiology    CARDIAC SURGERY      COLONOSCOPY      CORONARY ANGIOPLASTY WITH STENT PLACEMENT      DEBRIDEMENT TENNIS ELBOW      HERNIA REPAIR      KNEE ARTHROSCOPY      SD NEUROPLASTY &/TRANSPOS MEDIAN NRV CARPAL TUNNE Left 10/21/2024    Procedure: RELEASE CARPAL TUNNEL - LEFT;  Surgeon: Papo Castillo MD;  Location: [facility];  Service: Orthopedics    SD NEUROPLASTY &/TRANSPOSITION ULNAR NERVE ELBOW Left 10/21/2024    Procedure: RELEASE CUBITAL TUNNEL - LEFT, WITH SUBMUSCULAR TRANSPOSITION;  Surgeon: Papo Castillo MD;  Location: [facility];  Service: Orthopedics    SHOULDER ARTHROSCOPY      VASECTOMY       Family History   Problem Relation Age of Onset    Hypertension Maternal Grandfather     Diabetes type I Maternal Grandmother     Hypertension Paternal Grandfather     Diabetes type II Paternal Grandmother      Social History     Socioeconomic History    Marital status: /Civil Union     Spouse name: Not on file    Number of children: Not on file    Years of education: Not on file    Highest education level: Not on file   Occupational History    Not on file   Tobacco Use    Smoking status: Former     Current packs/day: 0.00     Average packs/day: 0.5 packs/day for 28.0 years (14.0 ttl pk-yrs)     Types: Cigarettes     Start date: 1977     Quit date: 2005     Years since quittin.9    Smokeless tobacco: Former     Types: Chew     Quit date: 1985   Vaping Use    Vaping status: Never Used   Substance and Sexual Activity    Alcohol use: Yes     Alcohol/week: 2.0 standard drinks of alcohol     Types: 2 Shots of liquor per week      Comment: 2 drinks weekly    Drug use: No    Sexual activity: Yes     Birth control/protection: Post-menopausal   Other Topics Concern    Not on file   Social History Narrative    Not on file     Social Drivers of Health     Financial Resource Strain: Not on file   Food Insecurity: Not on file   Transportation Needs: Not on file   Physical Activity: Not on file   Stress: Not on file   Social Connections: Not on file   Intimate Partner Violence: Not on file   Housing Stability: Not on file     Scheduled Meds:  Continuous Infusions:No current facility-administered medications for this visit.    PRN Meds:.  Allergies   Allergen Reactions    Banana - Food Allergy Throat Swelling    Tetanus Toxoid Other (See Comments)       Physical Examination:    /76   Pulse 70   Ht 6' (1.829 m)   Wt 92.5 kg (204 lb)   BMI 27.67 kg/m²     Gen: A&Ox3, NAD    Right Upper Extremity:  Skin CDI  No obvious deformity of the shoulder, arm, elbow, forearm, wrist, hand  Swelling Negative  TTP thumb CMC, thenar muscles  +pressure shear  Non-tender first dorsal compartment, neg finkelsteins  Sensation intact to light touch in the axillary median, ulnar, and radial nerve distributions  Full composite fist  2+RP  Positive Tinel's at the carpal tunnel  Positive Durkan's  Negative Tinel's at the cubital tunnel   Positive elbow flexion compression test       Left Upper Extremity:  Dressing clean and dry, removed  Incision healing well. Suture abscess at proximal incision, scabbed over, no drainage.  Mild tenderness at suture abscess.  TTP around elbow incision  Sensation intact to light touch in the axillary median, ulnar, and radial nerve distributions  Full digital and elbow ROM  Warm, well-perfused digits  Cap refill <2s      Studies:  7/19/24: Radiographs of the bilateral hands, multiple views, demonstrate degenerative changes at the bilateral thumb CMC joints with radial subluxation of the metacarpals on the trapeziums and osteophyte  formation, loss of joint space. No significant degenerative changes in the radiocarpal joint.     8/19/24: MSK US of the bilateral wrists and elbows shows evidence of carpal and cubital tunnel syndrome.      RIGHT SIDE: Median nerve cross sectional area distal forearm (CSAp): 7.6 sq mm. Maximum median nerve cross sectional area within carpal tunnel (CSAc): 13.6 sq mm. Delta CSA (CSAc-CSAp): 6 sq mm. Wrist to Forearm ratio (WFR ratio) (CSAc/CSAp): 1.8. No hypervascularity.     Ulnar nerve cross sectional area proximally: 6.4 sq mm. Maximum ulnar nerve cross sectional area within or near the cubital tunnel: 14 sq mm. Ulnar nerve cross sectional area distal to cubital tunnel: 4.5 sq mm. Maximal ulnar nerve/proximal cross sectional ratio: 2.2. No evidence of ulnar nerve dislocation from the cubital tunnel on dynamic flexion-extension evaluation. No accessory anconeus epitrochlearis muscle.            Assessment and Plan:  1. Arthritis of carpometacarpal (CMC) joint of right thumb  Case request operating room: Right thumb carpometacarpal joint arthroplasty with TightRope suspension, RELEASE CARPAL TUNNEL - Right, RELEASE CUBITAL TUNNEL - Right, with possible anterior transposition    Case request operating room: Right thumb carpometacarpal joint arthroplasty with TightRope suspension, RELEASE CARPAL TUNNEL - Right, RELEASE CUBITAL TUNNEL - Right, with possible anterior transposition      2. Carpal tunnel syndrome on right  Case request operating room: Right thumb carpometacarpal joint arthroplasty with TightRope suspension, RELEASE CARPAL TUNNEL - Right, RELEASE CUBITAL TUNNEL - Right, with possible anterior transposition    Case request operating room: Right thumb carpometacarpal joint arthroplasty with TightRope suspension, RELEASE CARPAL TUNNEL - Right, RELEASE CUBITAL TUNNEL - Right, with possible anterior transposition      3. Cubital tunnel syndrome on right  Case request operating room: Right thumb carpometacarpal  joint arthroplasty with TightRope suspension, RELEASE CARPAL TUNNEL - Right, RELEASE CUBITAL TUNNEL - Right, with possible anterior transposition    Case request operating room: Right thumb carpometacarpal joint arthroplasty with TightRope suspension, RELEASE CARPAL TUNNEL - Right, RELEASE CUBITAL TUNNEL - Right, with possible anterior transposition      4. S/P cubital tunnel release      Left      5. S/P carpal tunnel release      Left          59 y.o. male presents 5 weeks status post Release Carpal Tunnel - Left - Left and Release Cubital Tunnel - Left, With Submuscular Transposition - Left. DOS 10/21/2024. He did have a suture abscess that required antibtiotic treatment that has resolved. He completed his antibiotic course this morning. His numbness in the left hand has resolved. He is overall doing well post-operatively and would like to return to work on 12/09/2024. Work note provided.    In regards to his main complain of right thumb pain and right hand numbness and tingling, consistent with right thumb CMC arthritis and carpal and cubital tunnel, he wishes to proceed with surgery. We reviewed the risks of surgery including infection, bleeding, injury to nearby structures including the nerve, poor wound healing, pillar pain, stiffness, CRPS, and incomplete resolution or recurrence of symptoms. We reviewed the details of the procedure and the anticipated recovery period. All questions answered to patient's satisfaction. Written consent obtained in the office today.     right thumb CMC arthroplasty with tight rope suspension, right carpal tunnel release and right cubital tunnel release with possible anterior transposition.    Regional anesthesia with sedation    I recommend they follow up Return for Recheck postop .he expressed understanding of the plan and agreed. We encouraged them to contact our office with any questions or concerns.         Papo Castillo MD  Hand and Upper Extremity Surgery        *This  note was dictated using Dragon voice recognition software. Please excuse any word substitutions or errors.*

## 2025-01-13 NOTE — ANESTHESIA PROCEDURE NOTES
Peripheral Block    Patient location during procedure: holding area  Start time: 1/13/2025 1:15 PM  Reason for block: at surgeon's request and post-op pain management  Staffing  Performed by: Dexter Bolton MD  Authorized by: Dexter Bolton MD    Preanesthetic Checklist  Completed: patient identified, IV checked, site marked, risks and benefits discussed, surgical consent, monitors and equipment checked, pre-op evaluation and timeout performed  Peripheral Block  Patient position: sitting  Prep: ChloraPrep  Patient monitoring: frequent blood pressure checks, continuous pulse oximetry and heart rate  Block type: Supraclavicular  Laterality: right  Injection technique: single-shot  Procedures: ultrasound guided, Ultrasound guidance required for the procedure to increase accuracy and safety of medication placement and decrease risk of complications.  Ultrasound permanent image saved  ropivacaine (NAROPIN) 0.5 % injection 20 mL - Perineural   30 mL - 1/13/2025 1:22:00 PM  Needle  Needle type: Stimuplex   Needle gauge: 20 G  Needle length: 4 in  Needle localization: anatomical landmarks and ultrasound guidance  Needle insertion depth: 4 cm  Assessment  Injection assessment: incremental injection, frequent aspiration, injected with ease, negative aspiration, negative for heart rate change, no paresthesia on injection, no symptoms of intraneural/intravenous injection and needle tip visualized at all times  Paresthesia pain: none  Post-procedure:  site cleaned  patient tolerated the procedure well with no immediate complications

## 2025-01-14 ENCOUNTER — TELEPHONE (OUTPATIENT)
Dept: OBGYN CLINIC | Facility: MEDICAL CENTER | Age: 60
End: 2025-01-14

## 2025-01-14 NOTE — TELEPHONE ENCOUNTER
Called patient to check on him following surgery. He is doing well overall. His pain gets up to 8-9/10 but is well controlled with the oxycodone. He has his OT appt schedule for next Tuesday for his custom brace and to initiate ROM.    Papo Castillo MD

## 2025-01-20 ENCOUNTER — EVALUATION (OUTPATIENT)
Facility: CLINIC | Age: 60
End: 2025-01-20
Payer: OTHER MISCELLANEOUS

## 2025-01-20 DIAGNOSIS — G56.01 CARPAL TUNNEL SYNDROME ON RIGHT: ICD-10-CM

## 2025-01-20 DIAGNOSIS — G56.21 CUBITAL TUNNEL SYNDROME ON RIGHT: ICD-10-CM

## 2025-01-20 DIAGNOSIS — M18.11 PRIMARY OSTEOARTHRITIS OF FIRST CARPOMETACARPAL JOINT OF RIGHT HAND: Primary | ICD-10-CM

## 2025-01-20 PROCEDURE — 97162 PT EVAL MOD COMPLEX 30 MIN: CPT | Performed by: PHYSICAL THERAPIST

## 2025-01-20 PROCEDURE — 97110 THERAPEUTIC EXERCISES: CPT | Performed by: PHYSICAL THERAPIST

## 2025-01-20 PROCEDURE — 97112 NEUROMUSCULAR REEDUCATION: CPT | Performed by: PHYSICAL THERAPIST

## 2025-01-20 PROCEDURE — 97140 MANUAL THERAPY 1/> REGIONS: CPT | Performed by: PHYSICAL THERAPIST

## 2025-01-20 PROCEDURE — L3808 WHFO, RIGID W/O JOINTS: HCPCS | Performed by: PHYSICAL THERAPIST

## 2025-01-20 NOTE — PROGRESS NOTES
PT Evaluation     Today's date: 2025  Patient name: Fidel Calderon  : 1965  MRN: [id number]  Referring provider: Papo Castillo,*  Dx:   Encounter Diagnosis     ICD-10-CM    1. Primary osteoarthritis of first carpometacarpal joint of right hand  M18.11       2. Carpal tunnel syndrome on right  G56.01       3. Cubital tunnel syndrome on right  G56.21                      Assessment    Assessment details: Pt is a 58 YO male presenting to PT with pain, decreased AROM, strength and tolerance to activity.  Pt would benefit from skilled intervention to address these issues and maximize overall function.  Occupation- [employer]- currently off  Dominant- left ; Involved- right thumb,  CT, CuT       Goals    ST.  Decrease pain to 0-2/10 in 4-8 weeks to ease ADL and self care            2.  Decrease swelling 0.5 cm in 4-8 weeks            3.  Increase AROM 10-20 degrees in 4 weeks for improved ability to bathe, dress, and assist in functional activity            4.  Provide orthotic for protection, compression for support            5.  Instruct in activity modification for ADL and self care with independence in 4-6 weeks            6.  Instruct in HEP   LT.  Increase functional AROM to assist with independence in 8-12 weeks            2.  [place] with HEP in 4-6 weeks            3.  Increase  strength by 2-5 lbs in 8 weeks            4. Recreational activities are improved to maximum level in 12 weeks.            5.  ADL performance is improved to maximal level of function in 12 weeks.            6. Ability to RTW by DC        Plan  Patient would benefit from: skilled physical therapy  Planned modality interventions: cryotherapy, thermotherapy: hydrocollator packs and ultrasound    Planned therapy interventions: activity modification, manual therapy, strengthening, stretching, therapeutic activities, therapeutic exercise and home exercise program    Frequency: 2x week  Duration  in weeks: 4  Treatment plan discussed with: patient      Subjective Evaluation    History of Present Illness  Date of surgery: 2025  Mechanism of injury: Pt with persistent pain in his right thumb, numbness and tingling in his right hand.  Dr. Castillo completed surgery for right thumb CMC tight rope suspension plasty, Carpal and cubital tunnel release omn the right  Pt with history of left CT and CuT release 10/24.    Patient Goals  Patient goals for therapy: decreased edema, increased motion, decreased pain, increased strength, independence with ADLs/IADLs, return to sport/leisure activities and return to work    Pain  Current pain ratin  At best pain ratin  At worst pain ratin  Quality: dull ache    Hand dominance: left    Treatments  Current treatment: physical therapy      Objective     General Comments:      Elbow Comments   AROM right elbow E/F- 0/100; FA S/P- 70/70; wrist E/F- 15/25                      Thumb MP- 0/30; IP- 0/35                      Digits- MP- 30/45; PIP- 30/60; DIP- 0/30  Inspection- post op dressings removed and replaced with FA based thumb spica.  Pt instructed to wear at all times except therapy and hygiene, protecting sutures from getting wet.                       Sutures with breakthrough bleeding; pt instructed in not getting wet and general wound care  Circumference at elbow- 28.cm; wrist- 20.5 cm; Mps- 24.0 cm; thumb P1- 9.0 cm; MF P1- 8.5 cm  Sensation- pt notes no tingling in digits             Precautions: wear orthosis as directed      Manuals                         tubigrip F            WHFO 1-R                         Neuro Re-Ed             HP/pulsed biph 15                                      Ther Ex             Wrist jogs 2/5            T MP/IP 2/5            TGE -->                                                                                                                                                           Modalities                           CP 15

## 2025-01-21 DIAGNOSIS — Z47.89 AFTERCARE FOLLOWING SURGERY OF THE MUSCULOSKELETAL SYSTEM: ICD-10-CM

## 2025-01-22 RX ORDER — CEPHALEXIN 500 MG/1
500 CAPSULE ORAL 4 TIMES DAILY
Qty: 28 CAPSULE | Refills: 0 | OUTPATIENT
Start: 2025-01-22 | End: 2025-01-29

## 2025-01-23 ENCOUNTER — OFFICE VISIT (OUTPATIENT)
Facility: CLINIC | Age: 60
End: 2025-01-23
Payer: OTHER MISCELLANEOUS

## 2025-01-23 DIAGNOSIS — G56.01 CARPAL TUNNEL SYNDROME ON RIGHT: ICD-10-CM

## 2025-01-23 DIAGNOSIS — G56.21 CUBITAL TUNNEL SYNDROME ON RIGHT: ICD-10-CM

## 2025-01-23 DIAGNOSIS — M18.11 PRIMARY OSTEOARTHRITIS OF FIRST CARPOMETACARPAL JOINT OF RIGHT HAND: Primary | ICD-10-CM

## 2025-01-23 PROCEDURE — 97112 NEUROMUSCULAR REEDUCATION: CPT | Performed by: PHYSICAL THERAPIST

## 2025-01-23 PROCEDURE — 97140 MANUAL THERAPY 1/> REGIONS: CPT | Performed by: PHYSICAL THERAPIST

## 2025-01-23 PROCEDURE — 97110 THERAPEUTIC EXERCISES: CPT | Performed by: PHYSICAL THERAPIST

## 2025-01-23 NOTE — PROGRESS NOTES
Daily Note     Today's date: 2025  Patient name: Fidel Calderon  : 1965  MRN: [id number]  Referring provider: Papo Castillo,*  Dx:   Encounter Diagnosis     ICD-10-CM    1. Primary osteoarthritis of first carpometacarpal joint of right hand  M18.11       2. Carpal tunnel syndrome on right  G56.01       3. Cubital tunnel syndrome on right  G56.21                      Subjective: pt feels very stiff, noting he is trying to move his fingers, thumb and wrist      Objective: See treatment diary below    AROM right elbow E/F- 0/100; FA S/P- 70/70; wrist E/F- 40/35                      Thumb MP- 0/30; IP- 0/35                      Digits- MP- 20/55; PIP- 10/70; DIP- 0/30  Inspection- pt wearing his FA based thumb spica.  Pt instructed to wear at all times except therapy and hygiene, protecting sutures from getting wet.                       Sutures clean and dry  Circumference at elbow- 28.cm; wrist- 20.5 cm; Mps- 24.0 cm; thumb P1- 9.0 cm; MF P1- 8.5 cm  Sensation- pt notes no tingling in digits     Assessment: Tolerated treatment well. Patient would benefit from continued PT      Plan: Continue per plan of care.      Precautions: wear orthosis as directed      Manuals            STM 15 15           Tubigrip FA F            WHFO 1-R            AAROM W/D                                                    Neuro Re-Ed             HP/pulsed biph 15 15                                     Ther Ex             Wrist jogs  25           T MP/IP  2           TGE --> -->                                                                                                                                                          Modalities                          CP 15 15

## 2025-01-27 ENCOUNTER — OFFICE VISIT (OUTPATIENT)
Facility: CLINIC | Age: 60
End: 2025-01-27
Payer: OTHER MISCELLANEOUS

## 2025-01-27 DIAGNOSIS — G56.21 CUBITAL TUNNEL SYNDROME ON RIGHT: ICD-10-CM

## 2025-01-27 DIAGNOSIS — G56.01 CARPAL TUNNEL SYNDROME ON RIGHT: ICD-10-CM

## 2025-01-27 DIAGNOSIS — M18.11 PRIMARY OSTEOARTHRITIS OF FIRST CARPOMETACARPAL JOINT OF RIGHT HAND: Primary | ICD-10-CM

## 2025-01-27 PROCEDURE — 97110 THERAPEUTIC EXERCISES: CPT | Performed by: PHYSICAL THERAPIST

## 2025-01-27 PROCEDURE — 97140 MANUAL THERAPY 1/> REGIONS: CPT | Performed by: PHYSICAL THERAPIST

## 2025-01-27 PROCEDURE — 97112 NEUROMUSCULAR REEDUCATION: CPT | Performed by: PHYSICAL THERAPIST

## 2025-01-27 NOTE — PROGRESS NOTES
Daily Note     Today's date: 2025  Patient name: Fidel Calderon  : 1965  MRN: [id number]  Referring provider: Papo Castillo,*  Dx:   Encounter Diagnosis     ICD-10-CM    1. Primary osteoarthritis of first carpometacarpal joint of right hand  M18.11       2. Carpal tunnel syndrome on right  G56.01       3. Cubital tunnel syndrome on right  G56.21                      Subjective: pt doing well and trying to not use his hand too much.        Objective: See treatment diary below    AROM right elbow E/F- 0/100; FA S/P- 70/70; wrist E/F- 40/35                      Thumb MP- 0/30; IP- 0/35                      Digits- MP- 20/55; PIP- 10/70; DIP- 0/30  Inspection- pt wearing his FA based thumb spica.  Pt instructed to wear at all times except therapy and hygiene, protecting sutures from getting wet.                       Sutures clean and dry  Circumference at elbow- 28.cm; wrist- 20.5 cm; Mps- 24.0 cm; thumb P1- 9.0 cm; MF P1- 8.5 cm  Sensation- pt notes no tingling in digits    Assessment: Tolerated treatment well. Patient would benefit from continued PT    Plan: Continue per plan of care.      Precautions: wear orthosis as directed      Manuals           STM 15 15 15          Tubigrip FA F            WHFO 1-R            AAROM W/D   2                                                 Neuro Re-Ed             HP/pulsed biph 15 15 15                                    Ther Ex             Wrist jogs  2/5          T MP/IP  2          TGE --> --> 3x                                                                                                                                                         Modalities                          CP 15 15 15

## 2025-01-28 ENCOUNTER — OFFICE VISIT (OUTPATIENT)
Dept: OBGYN CLINIC | Facility: MEDICAL CENTER | Age: 60
End: 2025-01-28

## 2025-01-28 ENCOUNTER — APPOINTMENT (OUTPATIENT)
Facility: CLINIC | Age: 60
End: 2025-01-28
Payer: OTHER MISCELLANEOUS

## 2025-01-28 ENCOUNTER — APPOINTMENT (OUTPATIENT)
Dept: RADIOLOGY | Facility: MEDICAL CENTER | Age: 60
End: 2025-01-28
Payer: COMMERCIAL

## 2025-01-28 VITALS — WEIGHT: 205 LBS | BODY MASS INDEX: 27.77 KG/M2 | HEIGHT: 72 IN

## 2025-01-28 DIAGNOSIS — Z47.89 AFTERCARE FOLLOWING SURGERY OF THE MUSCULOSKELETAL SYSTEM: ICD-10-CM

## 2025-01-28 DIAGNOSIS — Z98.890 S/P CUBITAL TUNNEL RELEASE: ICD-10-CM

## 2025-01-28 DIAGNOSIS — M18.11 ARTHRITIS OF CARPOMETACARPAL (CMC) JOINT OF RIGHT THUMB: Primary | ICD-10-CM

## 2025-01-28 DIAGNOSIS — Z98.890 S/P CARPAL TUNNEL RELEASE: ICD-10-CM

## 2025-01-28 PROCEDURE — 73130 X-RAY EXAM OF HAND: CPT

## 2025-01-28 PROCEDURE — 99024 POSTOP FOLLOW-UP VISIT: CPT | Performed by: ORTHOPAEDIC SURGERY

## 2025-01-28 NOTE — PROGRESS NOTES
HAND & UPPER EXTREMITY OFFICE VISIT   Referred By:  No referring provider defined for this encounter.      Chief Complaint:     Right hand pain    Surgery:  Surgery Date: 1/13/2025 - Right thumb carpometacarpal joint arthroplasty with TightRope suspension - Right, RELEASE CARPAL TUNNEL - Right - Right, and RELEASE CUBITAL TUNNEL - Right - Right     10/21/24 - Release Carpal Tunnel - Left - Left and Release Cubital Tunnel - Left, With Submuscular Transposition - Left     History of Present Illness:   Patient presents now 15 days status post the above surgery. he reports swelling and soreness in the right hand. He has been applying ice to the area daily to help with the swelling and pain. He has been attending PT and working on his exercises and wearing his splint as directed. He reports intact sensation to all of the fingertips. He does note some intermittent numbness/tingling in the fingertips when his hand swells up.     Past Medical History:  Past Medical History:   Diagnosis Date   • Cardiac disease    • Diabetes mellitus (HCC)    • Hyperlipidemia    • Hypertension    • MI (myocardial infarction) (HCC)    • Myocardial infarction (HCC)    • Sleep apnea      Past Surgical History:   Procedure Laterality Date   • CARDIAC CATHETERIZATION N/A 03/06/2023    Procedure: Cardiac catheterization;  Surgeon: Fernando Panda MD;  Location: [facility];  Service: Cardiology   • CARDIAC CATHETERIZATION N/A 03/06/2023    Procedure: Cardiac pci;  Surgeon: Fernando Panda MD;  Location: [facility];  Service: Cardiology   • CARDIAC CATHETERIZATION N/A 03/06/2023    Procedure: Cardiac Coronary Angiogram;  Surgeon: Fernando Panda MD;  Location: [facility];  Service: Cardiology   • CARDIAC CATHETERIZATION Left 03/18/2024    Procedure: Cardiac catheterization;  Surgeon: Bob Fraser MD;  Location: [facility];  Service: Cardiology   • CARDIAC CATHETERIZATION N/A 03/18/2024    Procedure: Cardiac  pci;  Surgeon: Bob Fraser MD;  Location: [facility];  Service: Cardiology   • CARDIAC SURGERY     • COLONOSCOPY     • CORONARY ANGIOPLASTY WITH STENT PLACEMENT     • DEBRIDEMENT TENNIS ELBOW     • HERNIA REPAIR     • KNEE ARTHROSCOPY     • AK ARTHRP INTERCARPAL/CARP/MTCRPL JT INTERPOSITION Right 1/13/2025    Procedure: Right thumb carpometacarpal joint arthroplasty with TightRope suspension;  Surgeon: Papo Castillo MD;  Location:  MAIN OR;  Service: Orthopedics   • AK NEUROPLASTY &/TRANSPOS MEDIAN NRV CARPAL TUNNE Left 10/21/2024    Procedure: RELEASE CARPAL TUNNEL - LEFT;  Surgeon: Papo Castillo MD;  Location: [facility];  Service: Orthopedics   • AK NEUROPLASTY &/TRANSPOS MEDIAN NRV CARPAL TUNNE Right 1/13/2025    Procedure: RELEASE CARPAL TUNNEL - Right;  Surgeon: Papo Castillo MD;  Location:  MAIN OR;  Service: Orthopedics   • AK NEUROPLASTY &/TRANSPOSITION ULNAR NERVE ELBOW Left 10/21/2024    Procedure: RELEASE CUBITAL TUNNEL - LEFT, WITH SUBMUSCULAR TRANSPOSITION;  Surgeon: Papo Castillo MD;  Location[facility];  Service: Orthopedics   • AK NEUROPLASTY &/TRANSPOSITION ULNAR NERVE ELBOW Right 1/13/2025    Procedure: RELEASE CUBITAL TUNNEL - Right;  Surgeon: Papo Castillo MD;  Location:  MAIN OR;  Service: Orthopedics   • SHOULDER ARTHROSCOPY     • VASECTOMY       Family History   Problem Relation Age of Onset   • Hypertension Maternal Grandfather    • Diabetes type I Maternal Grandmother    • Hypertension Paternal Grandfather    • Diabetes type II Paternal Grandmother      Social History     Socioeconomic History   • Marital status: /Civil Union     Spouse name: Not on file   • Number of children: Not on file   • Years of education: Not on file   • Highest education level: Not on file   Occupational History   • Not on file   Tobacco Use   • Smoking status: Former     Current packs/day: 0.00     Average packs/day: 0.5 packs/day for 28.0  years (14.0 ttl pk-yrs)     Types: Cigarettes     Start date: 1977     Quit date: 2005     Years since quittin.0   • Smokeless tobacco: Former     Types: Chew     Quit date: 1985   Vaping Use   • Vaping status: Never Used   Substance and Sexual Activity   • Alcohol use: Yes     Alcohol/week: 2.0 standard drinks of alcohol     Types: 2 Shots of liquor per week     Comment: 2 drinks weekly   • Drug use: No   • Sexual activity: Yes   Other Topics Concern   • Not on file   Social History Narrative   • Not on file     Social Drivers of Health     Financial Resource Strain: Not on file   Food Insecurity: Not on file   Transportation Needs: Not on file   Physical Activity: Not on file   Stress: Not on file   Social Connections: Not on file   Intimate Partner Violence: Not on file   Housing Stability: Not on file     Scheduled Meds:  Continuous Infusions:No current facility-administered medications for this visit.    PRN Meds:.  Allergies   Allergen Reactions   • Banana - Food Allergy Throat Swelling   • Tetanus Toxoid Other (See Comments)       Physical Examination:    Ht 6' (1.829 m)   Wt 93 kg (205 lb)   BMI 27.80 kg/m²     Gen: A&Ox3, NAD    Right Upper Extremity:  Dressing clean and dry, removed  Incisions healing well without signs of infection   Sutures intact, removed  Thumb held in ABducted position  Positive swelling throughout the hand and wrist  TTP around incision sites on hand  Sensation intact to light touch in the axillary median, ulnar, and radial nerve distributions  Limited finger ROM due to swelling. Unable to form composite fist  Warm, well-perfused digits  Cap refill <2s      Left Upper Extremity:   Incision well-healed without signs of infection. Previous site of suture abscess is completely resolved  Non-tender around incision site  Sensation intact to light touch in the axillary median, ulnar, and radial nerve distributions  Full digital and elbow ROM  Warm, well-perfused  digits  Cap refill <2s      Studies:  Radiographs: I personally reviewed and independently interpreted the available radiographs.  1/28/25: Radiographs of the right hand, multiple views, demonstrate stable surgical changes s/p thumb CMC arthroplasty. No evidence of hardware failure or subsidence.     Assessment and Plan:  1. Arthritis of carpometacarpal (CMC) joint of right thumb        2. Aftercare following surgery of the musculoskeletal system  XR hand 3+ vw right      3. S/P cubital tunnel release        4. S/P carpal tunnel release            59 y.o. male presents 15 days status post Right thumb carpometacarpal joint arthroplasty with TightRope suspension - Right, RELEASE CARPAL TUNNEL - Right - Right, and RELEASE CUBITAL TUNNEL - Right - Right. Sutures removed in the office today. X-rays reviewed and discussed with the patient.  His swelling and discomfort is expected at this point postoperatively, particularly since he had multiple surgeries at the same time with a carpal and cubital tunnel release as well.  His nerve related symptoms seem to have significantly improved.  He is happy with his results thus far.      He should continue his PT exercises and wearing his splint as directed. He may continue icing the area as needed for pain and edema control. It is recommended he return to the office in 4 weeks, or sooner should symptoms worsen    he expressed understanding of the plan and agreed. We encouraged them to contact our office with any questions or concerns.         Papo Castillo MD  Hand and Upper Extremity Surgery          *This note was dictated using Dragon voice recognition software. Please excuse any word substitutions or errors.*      Scribe Attestation      I,:  Genevieve Hoover PA-C am acting as a scribe while in the presence of the attending physician.:       I,:  Papo Castillo MD personally performed the services described in this documentation    as scribed in my presence.:

## 2025-01-30 ENCOUNTER — OFFICE VISIT (OUTPATIENT)
Facility: CLINIC | Age: 60
End: 2025-01-30
Payer: OTHER MISCELLANEOUS

## 2025-01-30 DIAGNOSIS — M18.11 PRIMARY OSTEOARTHRITIS OF FIRST CARPOMETACARPAL JOINT OF RIGHT HAND: Primary | ICD-10-CM

## 2025-01-30 DIAGNOSIS — G56.01 CARPAL TUNNEL SYNDROME ON RIGHT: ICD-10-CM

## 2025-01-30 PROCEDURE — 97140 MANUAL THERAPY 1/> REGIONS: CPT | Performed by: PHYSICAL THERAPIST

## 2025-01-30 PROCEDURE — 97112 NEUROMUSCULAR REEDUCATION: CPT | Performed by: PHYSICAL THERAPIST

## 2025-01-30 PROCEDURE — 97110 THERAPEUTIC EXERCISES: CPT | Performed by: PHYSICAL THERAPIST

## 2025-01-30 PROCEDURE — 97535 SELF CARE MNGMENT TRAINING: CPT | Performed by: PHYSICAL THERAPIST

## 2025-01-30 NOTE — PROGRESS NOTES
Daily Note     Today's date: 2025  Patient name: Fidel Calderon  : 1965  MRN: [id number]  Referring provider: Papo Castillo,*  Dx:   Encounter Diagnosis     ICD-10-CM    1. Primary osteoarthritis of first carpometacarpal joint of right hand  M18.11       2. Carpal tunnel syndrome on right  G56.01                      Subjective: pt feels he is moving better as swelling decreases      Objective: See treatment diary below    AROM right elbow E/F- 0/100; FA S/P- 70/70; wrist E/F- 40/35                      Thumb MP- 0/30; IP- 0/35                      Digits- MP- 20/55; PIP- 10/70; DIP- 0/30  Inspection- pt wearing his FA based thumb spica.  PT fabricated HB orthosis.  Pt instructed to wear HB daytime and WHFO at nighttime  Circumference at elbow- 28.cm; wrist- 20.5 cm; Mps- 24.0 cm; thumb P1- 9.0 cm; MF P1- 8.5 cm  Sensation- pt notes no tingling in digits    Assessment: Tolerated treatment well. Patient would benefit from continued PT      Plan: Continue per plan of care.      Precautions: wear orthosis as directed      Manuals          STM 15 15 15 15         Tubigrip FA F            WHFO 1-R            AAROM W/D   2/ 2/5                                                Neuro Re-Ed             HP/pulsed biph 15 15 15 15                                   Ther Ex             Wrist jogs  2/ 2/5 2/5         T MP/IP / 2/ 2/ 2/5         TGE --> --> 3x 2/5                                                                                                                                                        Modalities             US    12 dc dc       CP 15 15 15 15

## 2025-02-03 ENCOUNTER — OFFICE VISIT (OUTPATIENT)
Facility: CLINIC | Age: 60
End: 2025-02-03
Payer: OTHER MISCELLANEOUS

## 2025-02-03 DIAGNOSIS — M18.11 PRIMARY OSTEOARTHRITIS OF FIRST CARPOMETACARPAL JOINT OF RIGHT HAND: Primary | ICD-10-CM

## 2025-02-03 DIAGNOSIS — G56.21 CUBITAL TUNNEL SYNDROME ON RIGHT: ICD-10-CM

## 2025-02-03 DIAGNOSIS — G56.01 CARPAL TUNNEL SYNDROME ON RIGHT: ICD-10-CM

## 2025-02-03 PROCEDURE — 97035 APP MDLTY 1+ULTRASOUND EA 15: CPT | Performed by: PHYSICAL THERAPIST

## 2025-02-03 PROCEDURE — 97110 THERAPEUTIC EXERCISES: CPT | Performed by: PHYSICAL THERAPIST

## 2025-02-03 PROCEDURE — 97112 NEUROMUSCULAR REEDUCATION: CPT | Performed by: PHYSICAL THERAPIST

## 2025-02-03 PROCEDURE — 97140 MANUAL THERAPY 1/> REGIONS: CPT | Performed by: PHYSICAL THERAPIST

## 2025-02-03 NOTE — PROGRESS NOTES
Daily Note     Today's date: 2/3/2025  Patient name: Fidel Calderon  : 1965  MRN: [id number]  Referring provider: Papo Castillo,*  Dx:   Encounter Diagnosis     ICD-10-CM    1. Primary osteoarthritis of first carpometacarpal joint of right hand  M18.11       2. Carpal tunnel syndrome on right  G56.01       3. Cubital tunnel syndrome on right  G56.21                      Subjective: pt notes swelling and soreness limit his movement.  He is cautious to avoid strong pinch or grasp. IF and digits are limited into flexion.      Objective: See treatment diary below    AROM right elbow E/F- 0/140; FA S/P- 70/70; wrist E/F- 50/45                      Thumb MP- 0/30; IP- 0/40                      IF- MP- 0/70; PIP- 0/70; DIP- 0/60  Inspection- pt wearing his FA based thumb spica.  PT fabricated HB orthosis.  Pt instructed to wear HB daytime and WHFO at nighttime.  (Pt notes WHFO is uncomfortable and has been wearing hand based at nighttime.)  Circumference at elbow- 28.cm; wrist- 1.0  cm; Mps- 22.2 cm; thumb P1- 8.5 cm; MF P1- 7.3 cm  Sensation- pt notes no tingling in digits    Assessment: Tolerated treatment well. Patient would benefit from continued PT      Plan: Continue per plan of care.      Precautions: wear orthosis as directed      Manuals  2/3        STM 15 15 15 15 15        Tubigrip FA F            WHFO 1-R            AAROM W/D  / 2/5 2/5 2/5                                               Neuro Re-Ed             HP/pulsed biph 15 15 15 15 15                                  Ther Ex             Wrist jogs / 2/5 2/5 2/5 2/5        T MP/IP 2/5 2/5 2/5 2/5 2/5        Thumb all     2/5        TGE --> --> 3x 2/5 2/5        Small pegs     /5        Thumbciser F     0% 2/10                                                                                                                             Modalities             US    12 12        CP 15 15 15 15 15

## 2025-02-04 ENCOUNTER — APPOINTMENT (OUTPATIENT)
Facility: CLINIC | Age: 60
End: 2025-02-04
Payer: OTHER MISCELLANEOUS

## 2025-02-05 ENCOUNTER — OFFICE VISIT (OUTPATIENT)
Facility: CLINIC | Age: 60
End: 2025-02-05
Payer: OTHER MISCELLANEOUS

## 2025-02-05 DIAGNOSIS — M18.11 PRIMARY OSTEOARTHRITIS OF FIRST CARPOMETACARPAL JOINT OF RIGHT HAND: Primary | ICD-10-CM

## 2025-02-05 DIAGNOSIS — G56.01 CARPAL TUNNEL SYNDROME ON RIGHT: ICD-10-CM

## 2025-02-05 PROCEDURE — 97110 THERAPEUTIC EXERCISES: CPT | Performed by: PHYSICAL THERAPIST

## 2025-02-05 PROCEDURE — 97112 NEUROMUSCULAR REEDUCATION: CPT | Performed by: PHYSICAL THERAPIST

## 2025-02-05 PROCEDURE — 97140 MANUAL THERAPY 1/> REGIONS: CPT | Performed by: PHYSICAL THERAPIST

## 2025-02-05 PROCEDURE — 97035 APP MDLTY 1+ULTRASOUND EA 15: CPT | Performed by: PHYSICAL THERAPIST

## 2025-02-05 NOTE — PROGRESS NOTES
Daily Note     Today's date: 2025  Patient name: Fidel Calderon  : 1965  MRN: [id number]  Referring provider: Papo Castillo,*  Dx:   Encounter Diagnosis     ICD-10-CM    1. Primary osteoarthritis of first carpometacarpal joint of right hand  M18.11       2. Carpal tunnel syndrome on right  G56.01                      Subjective: pt notes soreness but making progress      Objective: See treatment diary below    AROM right elbow E/F- 0/140; FA S/P- 70/70; wrist E/F- 50/45                      Thumb MP- 0/30; IP- 0/40                      IF- MP- 0/70; PIP- 0/70; DIP- 0/60  Inspection- pt wearing his HFO daytime   Circumference at elbow- 28.cm; wrist- 1.0  cm; Mps- 22.2 cm; thumb P1- 8.5 cm; MF P1- 7.3 cm  Sensation- pt notes no tingling in digits    Assessment: Tolerated treatment well. Patient would benefit from continued PT      Plan: Continue per plan of care.      Precautions: wear orthosis as directed      Manuals  2/3 2/5       STM 15 15 15 15 15 15       Tubigrip FA F            WHFO 1-R            AAROM W/D   2/5 2/5 2/5 2/5                                              Neuro Re-Ed             HP/pulsed biph 15 15 15 15 15 15                                 Ther Ex             Wrist jogs / 2/5 2/5 2/5 2/5 2/5       T MP/IP 2/5 2/5 2/5 2/5 2/5 2/5       Thumb all     2/5 2/5       TGE --> --> 3x 2/5 2/5 2/5       Small pegs     /5 2/5       Thumbciser F     0% /10 0% /10       twister      /10                                                                                                               Modalities             US    12 12 12       CP 15 15 15 15 15 15

## 2025-02-06 ENCOUNTER — APPOINTMENT (OUTPATIENT)
Facility: CLINIC | Age: 60
End: 2025-02-06
Payer: OTHER MISCELLANEOUS

## 2025-02-11 ENCOUNTER — OFFICE VISIT (OUTPATIENT)
Facility: CLINIC | Age: 60
End: 2025-02-11
Payer: OTHER MISCELLANEOUS

## 2025-02-11 DIAGNOSIS — G56.01 CARPAL TUNNEL SYNDROME ON RIGHT: ICD-10-CM

## 2025-02-11 DIAGNOSIS — M18.11 PRIMARY OSTEOARTHRITIS OF FIRST CARPOMETACARPAL JOINT OF RIGHT HAND: Primary | ICD-10-CM

## 2025-02-11 PROCEDURE — 97035 APP MDLTY 1+ULTRASOUND EA 15: CPT | Performed by: PHYSICAL THERAPIST

## 2025-02-11 PROCEDURE — 97110 THERAPEUTIC EXERCISES: CPT | Performed by: PHYSICAL THERAPIST

## 2025-02-11 PROCEDURE — 97140 MANUAL THERAPY 1/> REGIONS: CPT | Performed by: PHYSICAL THERAPIST

## 2025-02-11 PROCEDURE — 97112 NEUROMUSCULAR REEDUCATION: CPT | Performed by: PHYSICAL THERAPIST

## 2025-02-11 NOTE — PROGRESS NOTES
PT Re-Evaluation     Today's date: 2025  Patient name: Fidel Calderon  : 1965  MRN: [id number]  Referring provider: Papo Castillo,*  Dx:   Encounter Diagnosis     ICD-10-CM    1. Primary osteoarthritis of first carpometacarpal joint of right hand  M18.11       2. Carpal tunnel syndrome on right  G56.01                      Assessment    Assessment details: Pt is a 60 YO male presenting to PT with pain, decreased AROM, strength and tolerance to activity.  Pt would benefit from skilled intervention to address these issues and maximize overall function.  Occupation- [employer]- currently off  Dominant- left ; Involved- right thumb,  CT, CuT     Pt progressing steadily with AROM of the thumb.  Mild pain persists with motion and activity.  Pt is wearing his HFO for protection and avoiding stressed grasp and pinch activity    Goals    ST.  Decrease pain to 0-2/10 in 4-8 weeks to ease ADL and self care            2.  Decrease swelling 0.5 cm in 4-8 weeks            3.  Increase AROM 10-20 degrees in 4 weeks for improved ability to bathe, dress, and assist in functional activity            4.  Provide orthotic for protection, compression for support            5.  Instruct in activity modification for ADL and self care with independence in 4-6 weeks            6.  Instruct in HEP   LT.  Increase functional AROM to assist with independence in 8-12 weeks            2.  [place] with HEP in 4-6 weeks            3.  Increase  strength by 2-5 lbs in 8 weeks            4. Recreational activities are improved to maximum level in 12 weeks.            5.  ADL performance is improved to maximal level of function in 12 weeks.            6. Ability to RTW by DC        Plan  Patient would benefit from: skilled physical therapy  Planned modality interventions: cryotherapy, thermotherapy: hydrocollator packs and ultrasound    Planned therapy interventions: activity modification, manual  therapy, strengthening, stretching, therapeutic activities, therapeutic exercise and home exercise program    Frequency: 2x week  Duration in weeks: 4  Treatment plan discussed with: patient        Subjective Evaluation    History of Present Illness  Date of surgery: 2025  Mechanism of injury: Pt with persistent pain in his right thumb, numbness and tingling in his right hand.  Dr. Castillo completed surgery for right thumb CMC tight rope suspension plasty, Carpal and cubital tunnel release omn the right  Pt with history of left CT and CuT release 10/24.    Patient Goals  Patient goals for therapy: decreased edema, increased motion, decreased pain, increased strength, independence with ADLs/IADLs, return to sport/leisure activities and return to work    Pain  Current pain ratin  At best pain ratin  At worst pain ratin  Quality: dull ache and sharp    Hand dominance: left    Treatments  Current treatment: physical therapy        Objective     General Comments:      Wrist/Hand Comments  AROM right elbow E/F- 0/140; FA S/P- 70/70; wrist E/F- 50/45                      Thumb MP- 0/30; IP- 0/40                      IF- MP- 0/70; PIP- 0/70; DIP- 0/60  Inspection- pt wearing his HFO daytime as needed   Circumference at wrist- 21.0  cm; Mps- 22.2 cm; thumb P1- 8.5 cm; MF P1- 7.3 cm  Sensation- pt notes no tingling in digits, occasional sharp shooting pain in thumb                Precautions: wear orthosis as directed    Manuals  2/3          STM 15 15 15 15 15 15  15         Tubigrip FA F                     WHFO 1-R                     AAROM W/D                                                                                    Neuro Re-Ed                       HP/pulsed biph 15 15 15 15 15 15  15                                                         Ther Ex                       AROM Wrist           T all   2/5 2/5         TGE --> --> 3x 2/5 2/5 2/5  2/5         Straight pegs         2/5 2/5  2R         Thumbciser F         0% 2/10 0% 2/10  0% 2/10         twister           2/10  2/10                                                                                                                                                                                                         Modalities                       US       12 12 12  12         CP 15 15 15 15 15 15  def

## 2025-02-13 ENCOUNTER — OFFICE VISIT (OUTPATIENT)
Facility: CLINIC | Age: 60
End: 2025-02-13
Payer: OTHER MISCELLANEOUS

## 2025-02-13 DIAGNOSIS — G56.01 CARPAL TUNNEL SYNDROME ON RIGHT: ICD-10-CM

## 2025-02-13 DIAGNOSIS — M18.11 PRIMARY OSTEOARTHRITIS OF FIRST CARPOMETACARPAL JOINT OF RIGHT HAND: Primary | ICD-10-CM

## 2025-02-13 DIAGNOSIS — G56.21 CUBITAL TUNNEL SYNDROME ON RIGHT: ICD-10-CM

## 2025-02-13 PROCEDURE — 97110 THERAPEUTIC EXERCISES: CPT | Performed by: PHYSICAL THERAPIST

## 2025-02-13 PROCEDURE — 97112 NEUROMUSCULAR REEDUCATION: CPT | Performed by: PHYSICAL THERAPIST

## 2025-02-13 PROCEDURE — 97035 APP MDLTY 1+ULTRASOUND EA 15: CPT | Performed by: PHYSICAL THERAPIST

## 2025-02-13 PROCEDURE — 97140 MANUAL THERAPY 1/> REGIONS: CPT | Performed by: PHYSICAL THERAPIST

## 2025-02-13 NOTE — PROGRESS NOTES
"Daily Note     Today's date: 2025  Patient name: Fidel Calderon  : 1965  MRN: [id number]  Referring provider: Papo Castillo,*  Dx:   Encounter Diagnosis     ICD-10-CM    1. Primary osteoarthritis of first carpometacarpal joint of right hand  M18.11       2. Carpal tunnel syndrome on right  G56.01       3. Cubital tunnel syndrome on right  G56.21                      Subjective: pt frustrated with \"slow\" healing.  Anxious to begin using more at home      Objective: See treatment diary below    HFO modified for better fit as swelling decreases  AROM right elbow E/F- 0/140; FA S/P- 70/70; wrist E/F- 50/45                      Thumb MP- 0/30; IP- 0/40                      IF- MP- 0/70; PIP- 0/70; DIP- 0/60 (tight/painful flexion at MCP)  Inspection- pt wearing his HFO daytime as needed   Circumference at wrist- 21.0  cm; Mps- 22.2 cm; thumb P1- 8.5 cm; MF P1- 7.3 cm  Sensation- pt notes no tingling in digits, occasional sharp shooting pain in thumb        Assessment: Tolerated treatment well. Patient would benefit from continued PT      Plan: Continue per plan of care.      Precautions: wear orthosis as directed    Manuals  2/3 /       STM 15 15 15 15 15 15  15  15       Tubigrip FA F                     WHFO 1-R                     AAROM W/D                                                                                  Neuro Re-Ed                       HP/pulsed biph 15 15 15 15 15 15  15  15                                                       Ther Ex                       AROM Wrist  / 2/       T all        TGE --> --> 3x 2/       Straight pegs           2R  2 R       Thumbciser F         0% 2/10 0% 2/10  0% 2/10  0% 2/10       twister           2/10  2/10  2/10                                                                                      "                                                                                                                  Modalities                       US       12 12 12  12  12       CP 15 15 15 15 15 15  def  def

## 2025-02-18 ENCOUNTER — OFFICE VISIT (OUTPATIENT)
Facility: CLINIC | Age: 60
End: 2025-02-18
Payer: OTHER MISCELLANEOUS

## 2025-02-18 DIAGNOSIS — M18.11 PRIMARY OSTEOARTHRITIS OF FIRST CARPOMETACARPAL JOINT OF RIGHT HAND: Primary | ICD-10-CM

## 2025-02-18 DIAGNOSIS — G56.01 CARPAL TUNNEL SYNDROME ON RIGHT: ICD-10-CM

## 2025-02-18 DIAGNOSIS — G56.21 CUBITAL TUNNEL SYNDROME ON RIGHT: ICD-10-CM

## 2025-02-18 PROCEDURE — 97112 NEUROMUSCULAR REEDUCATION: CPT | Performed by: PHYSICAL THERAPIST

## 2025-02-18 PROCEDURE — 97110 THERAPEUTIC EXERCISES: CPT | Performed by: PHYSICAL THERAPIST

## 2025-02-18 PROCEDURE — 97140 MANUAL THERAPY 1/> REGIONS: CPT | Performed by: PHYSICAL THERAPIST

## 2025-02-18 NOTE — PROGRESS NOTES
Daily Note     Today's date: 2025  Patient name: Fidel Calderon  : 1965  MRN: [id number]  Referring provider: Papo Castillo,*  Dx:   Encounter Diagnosis     ICD-10-CM    1. Primary osteoarthritis of first carpometacarpal joint of right hand  M18.11       2. Carpal tunnel syndrome on right  G56.01       3. Cubital tunnel syndrome on right  G56.21                      Subjective: pt making progress but still feels pain along the thumb CMC and dorsal IF MCP      Objective: See treatment diary below    HFO modified for better fit as swelling decreases  AROM right elbow E/F- 0/140; FA S/P- 70/70; wrist E/F- 50/45                      Thumb MP- 0/30; IP- 0/40                      IF- MP- 0/70; PIP- 0/70; DIP- 0/60 (tight/painful flexion at MCP)  Inspection- pt wearing his HFO daytime as needed   Circumference at wrist- 21.0  cm; Mps- 22.2 cm; thumb P1- 8.5 cm; MF P1- 7.3 cm  Sensation- pt notes no tingling in digits, occasional sharp shooting pain in thumb      Assessment: Tolerated treatment well. Patient would benefit from continued PT      Plan: Continue per plan of care.      Precautions: wear orthosis as directed    Manuals /3 2/5  2/11  2/13  2/18     STM 15 15 15 15 15 15  15  15  15     Tubigrip FA F                     WHFO 1-R                     AAROM W/D                                                                                 Neuro Re-Ed                       HP/pulsed biph 15 15 15 15 15 15  15  15  15                                                     Ther Ex                       AROM Wrist  / 2/////     T all      TGE --> --> 3x 2/     TP          T 2'    TP 5 finger         T 2/10    Nibs             --->  --->   Thumbciser E/F         0% 2/10 0% 2/10  0% 2/10  0% 2/10  50% 2/10     twister                  Straight pegs                  4R                                                                                                                                                                             Modalities                                      CP 15 15 15 15 15 15  def  def

## 2025-02-20 ENCOUNTER — OFFICE VISIT (OUTPATIENT)
Facility: CLINIC | Age: 60
End: 2025-02-20
Payer: OTHER MISCELLANEOUS

## 2025-02-20 DIAGNOSIS — G56.01 CARPAL TUNNEL SYNDROME ON RIGHT: ICD-10-CM

## 2025-02-20 DIAGNOSIS — M18.11 PRIMARY OSTEOARTHRITIS OF FIRST CARPOMETACARPAL JOINT OF RIGHT HAND: Primary | ICD-10-CM

## 2025-02-20 PROCEDURE — 97110 THERAPEUTIC EXERCISES: CPT | Performed by: PHYSICAL THERAPIST

## 2025-02-20 PROCEDURE — 97140 MANUAL THERAPY 1/> REGIONS: CPT | Performed by: PHYSICAL THERAPIST

## 2025-02-20 PROCEDURE — 97112 NEUROMUSCULAR REEDUCATION: CPT | Performed by: PHYSICAL THERAPIST

## 2025-02-20 NOTE — PROGRESS NOTES
Daily Note     Today's date: 2025  Patient name: Fidel Calderon  : 1965  MRN: [id number]  Referring provider: Papo Castillo,*  Dx:   Encounter Diagnosis     ICD-10-CM    1. Primary osteoarthritis of first carpometacarpal joint of right hand  M18.11       2. Carpal tunnel syndrome on right  G56.01                      Subjective: pt  making slow but steady progress, noting mild soreness during exercises but no residual pain      Objective: See treatment diary below    HFO modified for better fit as swelling decreases  AROM right elbow E/F- 0/140; FA S/P- 70/70; wrist E/F- 50/45                      Thumb MP- 0/30; IP- 0/40                      IF- MP- 0/70; PIP- 0/70; DIP- 0/60 (tight/painful flexion at MCP)  Inspection- pt wearing his HFO daytime as needed   Circumference at wrist- 21.0  cm; Mps- 22.2 cm; thumb P1- 8.5 cm; MF P1- 7.3 cm  Sensation- pt notes no tingling in digits, occasional sharp shooting pain in thumb     Assessment: Tolerated treatment well. Patient would benefit from continued PT      Plan: Continue per plan of care.      Precautions: wear orthosis as directed    Manuals             STM 15 15 15 15 15 15  15  15  15     Tubigrip FA F                     WHFO 1-R                     AAROM W/D   / 2/5 2/5 2////  2/5      TP HEP  Tan                                                                      Neuro Re-Ed                       HP/pulsed biph 15 15 15 15 15 15  15  15  15                                                     Ther Ex                       AROM Wrist  / 2/5 2/5 2/5 2/5 2/5  2/5  2/5  2/5     T all /// 2//5 /5  /5  /  2/5     TGE  --> 3x 2// 2/  2//  2/     TP  T 2'        T 2'    TP 5 finger T /10        T 2/10    Nibs  --->  -->         --->  --->   Thumbciser E/F  50% 2/10       0% /10 0% /10  0% /10  0% 2/10  50% 2/10     twister  20/20         20/20  20/20  20/20  20/20      Straight pegs  4R                 4R                                                                                                                                                                             Modalities                                      CP 15 15 15 15 15 15  def  def

## 2025-02-24 ENCOUNTER — OFFICE VISIT (OUTPATIENT)
Facility: CLINIC | Age: 60
End: 2025-02-24
Payer: OTHER MISCELLANEOUS

## 2025-02-24 DIAGNOSIS — G56.01 CARPAL TUNNEL SYNDROME ON RIGHT: ICD-10-CM

## 2025-02-24 DIAGNOSIS — G56.21 CUBITAL TUNNEL SYNDROME ON RIGHT: ICD-10-CM

## 2025-02-24 DIAGNOSIS — M18.11 PRIMARY OSTEOARTHRITIS OF FIRST CARPOMETACARPAL JOINT OF RIGHT HAND: Primary | ICD-10-CM

## 2025-02-24 PROCEDURE — 97140 MANUAL THERAPY 1/> REGIONS: CPT | Performed by: PHYSICAL THERAPIST

## 2025-02-24 PROCEDURE — 97035 APP MDLTY 1+ULTRASOUND EA 15: CPT | Performed by: PHYSICAL THERAPIST

## 2025-02-24 PROCEDURE — 97112 NEUROMUSCULAR REEDUCATION: CPT | Performed by: PHYSICAL THERAPIST

## 2025-02-24 PROCEDURE — 97110 THERAPEUTIC EXERCISES: CPT | Performed by: PHYSICAL THERAPIST

## 2025-02-24 NOTE — PROGRESS NOTES
Daily Note     Today's date: 2025  Patient name: Fidel Calderon  : 1965  MRN: [id number]  Referring provider: Papo Castillo,*  Dx:   Encounter Diagnosis     ICD-10-CM    1. Primary osteoarthritis of first carpometacarpal joint of right hand  M18.11       2. Carpal tunnel syndrome on right  G56.01       3. Cubital tunnel syndrome on right  G56.21                      Subjective: pt starting to feel less pain more regularly.  Limitation in motion and strength are apparent.      Objective: See treatment diary below    HFO modified for better fit as swelling decreases  AROM right elbow E/F- 0/140; FA S/P- 70/70; wrist E/F- 50/45                      Thumb MP- 0/30; IP- 0/40                      IF- MP- 0/70; PIP- 0/70; DIP- 0/60 (tight/painful flexion at MCP)  Inspection- pt wearing his HFO daytime as needed   Circumference at wrist- 21.0  cm; Mps- 22.2 cm; thumb P1- 8.5 cm; MF P1- 7.3 cm  Sensation- pt notes no tingling in digits, occasional sharp shooting pain in thumb     Assessment: Tolerated treatment well. Patient would benefit from continued PT      Plan: Continue per plan of care.      Precautions: wear orthosis as directed    Manuals            STM 15 15 15 15 15 15  15  15  15     Tubigrip FA F                     WHFO 1-R                     AAROM W/D   / 2/ 2/ 2///  2/      TP HEP  Tan                                                                      Neuro Re-Ed                       HP/pulsed biph 15 15 15 15 15 15  15  15  15                                                     Ther Ex                       AROM Wrist  / 2/5 2/5 2/5 2/5 2/5  2/  2/  2/5     T all ////////     TGE / 3x 2/ 2/ 2/  2/  2/  2/5     TP  T 2' T 2'       T 2'    TP 5 finger T 2/10 T 2/10       T 2/10    Nibs  --->  -->  -->       --->  --->   Thumbciser E/F  50% 2/10  50% 2/10     0% 2/10 0% 2/10  0% 2/10  0% 2/10  50% 2/10      twister  20/20  20/20       20/20  20/20  20/20  20/20      Straight pegs  4R  4R              4R      Flexbar    Y 20/20                    David    20 2/10                                                                                                                                           Modalities                                      CP 15 15 15 15 15 15  def  def

## 2025-02-25 ENCOUNTER — APPOINTMENT (OUTPATIENT)
Facility: CLINIC | Age: 60
End: 2025-02-25
Payer: OTHER MISCELLANEOUS

## 2025-02-25 ENCOUNTER — OFFICE VISIT (OUTPATIENT)
Dept: CARDIOLOGY CLINIC | Facility: CLINIC | Age: 60
End: 2025-02-25
Payer: COMMERCIAL

## 2025-02-25 ENCOUNTER — APPOINTMENT (OUTPATIENT)
Dept: RADIOLOGY | Facility: MEDICAL CENTER | Age: 60
End: 2025-02-25
Payer: COMMERCIAL

## 2025-02-25 ENCOUNTER — OFFICE VISIT (OUTPATIENT)
Dept: OBGYN CLINIC | Facility: MEDICAL CENTER | Age: 60
End: 2025-02-25

## 2025-02-25 VITALS
WEIGHT: 204.2 LBS | DIASTOLIC BLOOD PRESSURE: 60 MMHG | SYSTOLIC BLOOD PRESSURE: 116 MMHG | HEART RATE: 80 BPM | OXYGEN SATURATION: 96 % | HEIGHT: 72 IN | BODY MASS INDEX: 27.66 KG/M2

## 2025-02-25 VITALS — WEIGHT: 205 LBS | HEIGHT: 72 IN | BODY MASS INDEX: 27.77 KG/M2

## 2025-02-25 DIAGNOSIS — I25.2 OLD MYOCARDIAL INFARCTION OF INFERIOR WALL, GREATER THAN 8 WEEKS: ICD-10-CM

## 2025-02-25 DIAGNOSIS — I10 PRIMARY HYPERTENSION: ICD-10-CM

## 2025-02-25 DIAGNOSIS — Z98.890 S/P CARPAL TUNNEL RELEASE: ICD-10-CM

## 2025-02-25 DIAGNOSIS — Z47.89 AFTERCARE FOLLOWING SURGERY OF THE MUSCULOSKELETAL SYSTEM: ICD-10-CM

## 2025-02-25 DIAGNOSIS — I25.10 CORONARY ARTERY DISEASE INVOLVING NATIVE CORONARY ARTERY OF NATIVE HEART WITHOUT ANGINA PECTORIS: Primary | ICD-10-CM

## 2025-02-25 DIAGNOSIS — M18.11 ARTHRITIS OF CARPOMETACARPAL (CMC) JOINT OF RIGHT THUMB: ICD-10-CM

## 2025-02-25 DIAGNOSIS — Z98.890 S/P CUBITAL TUNNEL RELEASE: ICD-10-CM

## 2025-02-25 DIAGNOSIS — E78.2 MIXED HYPERLIPIDEMIA: ICD-10-CM

## 2025-02-25 DIAGNOSIS — M18.11 ARTHRITIS OF CARPOMETACARPAL (CMC) JOINT OF RIGHT THUMB: Primary | ICD-10-CM

## 2025-02-25 PROCEDURE — 99024 POSTOP FOLLOW-UP VISIT: CPT | Performed by: ORTHOPAEDIC SURGERY

## 2025-02-25 PROCEDURE — 73130 X-RAY EXAM OF HAND: CPT

## 2025-02-25 PROCEDURE — 99213 OFFICE O/P EST LOW 20 MIN: CPT | Performed by: INTERNAL MEDICINE

## 2025-02-25 NOTE — PROGRESS NOTES
HAND & UPPER EXTREMITY OFFICE VISIT   Referred By:  No referring provider defined for this encounter.      Chief Complaint:     Right hand pain    Surgery:  Surgery Date: 1/13/2025 - Right thumb carpometacarpal joint arthroplasty with TightRope suspension - Right, RELEASE CARPAL TUNNEL - Right - Right, and RELEASE CUBITAL TUNNEL - Right - Right     10/21/24 - Release Carpal Tunnel - Left - Left and Release Cubital Tunnel - Left, With Submuscular Transposition - Left     History of Present Illness:   Patient presents now 6 weeks status post the right thumb CMC arthroplasty with carpal and cubital tunnel release. He is also 4 months s/p left carpal and cubital tunnel release. he reports the right hand is still swollen and stiff. He has been working with PT for his exercises. He also experiences some stiffness in the wrist intermittently. He does report numbness just in the thumb which he attributes to the swelling.     For the left hand, he reports the numbness and tingling in the fingers is well-controlled. He does have some sensitivity when leaning on the left elbow.     Past Medical History:  Past Medical History:   Diagnosis Date    Cardiac disease     Diabetes mellitus (HCC)     Hyperlipidemia     Hypertension     MI (myocardial infarction) (HCC)     Myocardial infarction (HCC)     Sleep apnea      Past Surgical History:   Procedure Laterality Date    CARDIAC CATHETERIZATION N/A 03/06/2023    Procedure: Cardiac catheterization;  Surgeon: Fernando Panda MD;  [facility]: [facility];  Service: Cardiology    CARDIAC CATHETERIZATION N/A 03/06/2023    Procedure: Cardiac pci;  Surgeon: Fernando Panda MD;  [facility];  Service: Cardiology    CARDIAC CATHETERIZATION N/A 03/06/2023    Procedure: Cardiac Coronary Angiogram;  Surgeon: Fernando Panda MD;  [facility];  Service: Cardiology    CARDIAC CATHETERIZATION Left 03/18/2024    Procedure: Cardiac catheterization;   Surgeon: Bob Fraser MD;  [facility];  Service: Cardiology    CARDIAC CATHETERIZATION N/A 03/18/2024    Procedure: Cardiac pci;  Surgeon: Bob Fraser MD;  [facility];  Service: Cardiology    CARDIAC SURGERY      COLONOSCOPY      CORONARY ANGIOPLASTY WITH STENT PLACEMENT      DEBRIDEMENT TENNIS ELBOW      HERNIA REPAIR      KNEE ARTHROSCOPY      VT ARTHRP INTERCARPAL/CARP/MTCRPL JT INTERPOSITION Right 1/13/2025    Procedure: Right thumb carpometacarpal joint arthroplasty with TightRope suspension;  Surgeon: Papo Castillo MD;  [facility];  Service: Orthopedics    VT NEUROPLASTY &/TRANSPOS MEDIAN NRV CARPAL TUNNE Left 10/21/2024    Procedure: RELEASE CARPAL TUNNEL - LEFT;  Surgeon: Papo Castillo MD;  [facility];  Service: Orthopedics    VT NEUROPLASTY &/TRANSPOS MEDIAN NRV CARPAL TUNNE Right 1/13/2025    Procedure: RELEASE CARPAL TUNNEL - Right;  Surgeon: Papo Castillo MD;  [facility];  Service: Orthopedics    VT NEUROPLASTY &/TRANSPOSITION ULNAR NERVE ELBOW Left 10/21/2024    Procedure: RELEASE CUBITAL TUNNEL - LEFT, WITH SUBMUSCULAR TRANSPOSITION;  Surgeon: Papo Castillo MD;  [facility];  Service: Orthopedics    VT NEUROPLASTY &/TRANSPOSITION ULNAR NERVE ELBOW Right 1/13/2025    Procedure: RELEASE CUBITAL TUNNEL - Right;  Surgeon: Papo Castillo MD;  [facility];  Service: Orthopedics    SHOULDER ARTHROSCOPY      VASECTOMY       Family History   Problem Relation Age of Onset    Hypertension Maternal Grandfather     Diabetes type I Maternal Grandmother     Hypertension Paternal Grandfather     Diabetes type II Paternal Grandmother      Social History     Socioeconomic History    Marital status: /Civil Union     Spouse name: Not on file    Number of children: Not on file    Years of education: Not on file    Highest education level: Not on file   Occupational History    Not on  file   Tobacco Use    Smoking status: Former     Current packs/day: 0.00     Average packs/day: 0.5 packs/day for 28.0 years (14.0 ttl pk-yrs)     Types: Cigarettes     Start date: 1977     Quit date: 2005     Years since quittin.1    Smokeless tobacco: Former     Types: Chew     Quit date: 1985   Vaping Use    Vaping status: Never Used   Substance and Sexual Activity    Alcohol use: Yes     Alcohol/week: 2.0 standard drinks of alcohol     Types: 2 Shots of liquor per week     Comment: 2 drinks weekly    Drug use: No    Sexual activity: Yes   Other Topics Concern    Not on file   Social History Narrative    Not on file     Social Drivers of Health     Financial Resource Strain: Not on file   Food Insecurity: Not on file   Transportation Needs: Not on file   Physical Activity: Not on file   Stress: Not on file   Social Connections: Not on file   Intimate Partner Violence: Not on file   Housing Stability: Not on file     Scheduled Meds:  Continuous Infusions:No current facility-administered medications for this visit.    PRN Meds:.  Allergies   Allergen Reactions    Banana - Food Allergy Throat Swelling    Tetanus Toxoid Other (See Comments)       Physical Examination:    Ht 6' (1.829 m)   Wt 93 kg (205 lb)   BMI 27.80 kg/m²     Gen: A&Ox3, NAD    Right Upper Extremity:  Incisions well-healed without signs of infection   Thumb held in Abducted position  Positive swelling throughout thumb and radial hand  Sensation intact to light touch in the axillary median, ulnar, and radial nerve distributions  Able to form loose fist and oppose thumb to tip of small finger with coaching  Warm, well-perfused digits  Cap refill <2s      Left Upper Extremity:   Incision well-healed without signs of infection.  Non-tender around incision sites  Sensation intact to light touch in the axillary median, ulnar, and radial nerve distributions  Full digital and elbow ROM  Warm, well-perfused digits  Cap refill  <2s      Studies:  Radiographs: I personally reviewed and independently interpreted the available radiographs.  2/25/25: Radiographs of the right hand, multiple views, demonstrate stable surgical changes s/p thumb CMC arthroplasty. No evidence of hardware failure or subsidence.     Assessment and Plan:  1. Arthritis of carpometacarpal (CMC) joint of right thumb  XR hand 3+ vw right      2. Aftercare following surgery of the musculoskeletal system        3. S/P cubital tunnel release        4. S/P carpal tunnel release              59 y.o. male presents 6 weeks status post Right thumb carpometacarpal joint arthroplasty with TightRope suspension - Right, RELEASE CARPAL TUNNEL - Right - Right, and RELEASE CUBITAL TUNNEL - Right - Right. He is doing well since the surgery and making progress with his strength and motion.  He is little bit of swelling still which I think is contributing to his stiffness and discomfort.  At this point he may begin to wean out of the brace and continue progressing with PT.     His left arm is still doing well and he denies any numbness or tingling in the fingers. He does still have some soreness at the left thumb CMC but he reports it is well-controlled at this point and declined CSI today.     It is recommended he return to the office in 6 weeks, or sooner should symptoms worsen.    he expressed understanding of the plan and agreed. We encouraged them to contact our office with any questions or concerns.         Papo Castillo MD  Hand and Upper Extremity Surgery          *This note was dictated using Dragon voice recognition software. Please excuse any word substitutions or errors.*      Scribe Attestation      I,:  Genevieve Hoover PA-C am acting as a scribe while in the presence of the attending physician.:       I,:  Papo Castillo MD personally performed the services described in this documentation    as scribed in my presence.:              Adequate: hears normal conversation without difficulty

## 2025-02-26 ENCOUNTER — TELEPHONE (OUTPATIENT)
Age: 60
End: 2025-02-26

## 2025-02-26 PROBLEM — R07.89 OTHER CHEST PAIN: Status: RESOLVED | Noted: 2024-02-13 | Resolved: 2025-02-26

## 2025-02-26 NOTE — PROGRESS NOTES
Cardiology Follow Up    Fidel Calderon  1965  [id number]  [facility] [place]  [address]  [place] [id number] [phone]    1. Coronary artery disease involving native coronary artery of native heart without angina pectoris        2. 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, greater than 8 weeks        3. Primary hypertension        4. Mixed hyperlipidemia              Discussion/Summary: All of his assessed cardiac problems are stable. I have reviewed his medications and made no changes. No cardiac testing is ordered.  RTO 6 months.      Interval History: He continues to do well since his RCA stenting on 3/18/2024. He is less active. He is doing some walking for exercise and denies CP, SOB, palpitations.  /60  Wt 204 lbs  LDL 60  A1C 6.4      Patient Active Problem List   Diagnosis    CAD (coronary artery disease)    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, greater than 8 weeks    Primary hypertension    Mixed hyperlipidemia    Status post insertion of drug eluting coronary artery stent    Type 2 diabetes mellitus with circulatory disorder, without long-term current use of insulin (HCC)    Microalbuminuria    Fatigue due to excessive exertion    Bilateral hand pain    Carpal tunnel syndrome, bilateral    Cubital tunnel syndrome, bilateral    Arthritis of carpometacarpal (CMC) joint of right thumb     Past Medical History:   Diagnosis Date    Cardiac disease     Diabetes mellitus (HCC)     Hyperlipidemia     Hypertension     MI (myocardial infarction) (HCC)     Myocardial infarction (HCC)     Sleep apnea      Social History     Socioeconomic History    Marital status: /Civil Union     Spouse name: Not on file    Number of children: Not on file    Years of education: Not on file    Highest education level: Not on file   Occupational History    Not on file   Tobacco Use    Smoking status: Former     Current  packs/day: 0.00     Average packs/day: 0.5 packs/day for 28.0 years (14.0 ttl pk-yrs)     Types: Cigarettes     Start date: 1977     Quit date: 2005     Years since quittin.1    Smokeless tobacco: Former     Types: Chew     Quit date: 1985   Vaping Use    Vaping status: Never Used   Substance and Sexual Activity    Alcohol use: Yes     Alcohol/week: 2.0 standard drinks of alcohol     Types: 2 Shots of liquor per week     Comment: 2 drinks weekly    Drug use: No    Sexual activity: Yes   Other Topics Concern    Not on file   Social History Narrative    Not on file     Social Drivers of Health     Financial Resource Strain: Not on file   Food Insecurity: Not on file   Transportation Needs: Not on file   Physical Activity: Not on file   Stress: Not on file   Social Connections: Not on file   Intimate Partner Violence: Not on file   Housing Stability: Not on file      Family History   Problem Relation Age of Onset    Hypertension Maternal Grandfather     Diabetes type I Maternal Grandmother     Hypertension Paternal Grandfather     Diabetes type II Paternal Grandmother      Past Surgical History:   Procedure Laterality Date    CARDIAC CATHETERIZATION N/A 2023    Procedure: Cardiac catheterization;  Surgeon: Fernando Panda MD;  [facility];  Service: Cardiology    CARDIAC CATHETERIZATION N/A 2023    Procedure: Cardiac pci;  Surgeon: Fernando Panda MD;  [facility];  Service: Cardiology    CARDIAC CATHETERIZATION N/A 2023    Procedure: Cardiac Coronary Angiogram;  Surgeon: Fernando Panda MD;  [facility];  Service: Cardiology    CARDIAC CATHETERIZATION [facility] 2024    Procedure: Cardiac catheterization;  Surgeon: Bob Fraser MD;  [facility];  Service: Cardiology    CARDIAC CATHETERIZATION N/A 2024    Procedure: Cardiac pci;  Surgeon: Bob Fraser MD;  [facility];  Service:  Cardiology    CARDIAC SURGERY      COLONOSCOPY      CORONARY ANGIOPLASTY WITH STENT PLACEMENT      DEBRIDEMENT TENNIS ELBOW      HERNIA REPAIR      KNEE ARTHROSCOPY      NC ARTHRP INTERCARPAL/CARP/MTCRPL JT INTERPOSITION Right 1/13/2025    Procedure: Right thumb carpometacarpal joint arthroplasty with TightRope suspension;  Surgeon: Papo Castillo MD;  [facility];  Service: Orthopedics    NC NEUROPLASTY &/TRANSPOS MEDIAN NRV CARPAL TUNNE Left 10/21/2024    Procedure: RELEASE CARPAL TUNNEL - LEFT;  Surgeon: Papo Castillo MD;  [facility];  Service: Orthopedics    NC NEUROPLASTY &/TRANSPOS MEDIAN NRV CARPAL TUNNE Right 1/13/2025    Procedure: RELEASE CARPAL TUNNEL - Right;  Surgeon: Papo Castillo MD;  [facility];  Service: Orthopedics    NC NEUROPLASTY &/TRANSPOSITION ULNAR NERVE ELBOW Left 10/21/2024    Procedure: RELEASE CUBITAL TUNNEL - LEFT, WITH SUBMUSCULAR TRANSPOSITION;  Surgeon: Papo Castillo MD;  [facility];  Service: Orthopedics    NC NEUROPLASTY &/TRANSPOSITION ULNAR NERVE ELBOW Right 1/13/2025    Procedure: RELEASE CUBITAL TUNNEL - Right;  Surgeon: Papo Castillo MD;  [facility];  Service: Orthopedics    SHOULDER ARTHROSCOPY      VASECTOMY         Current Outpatient Medications:     acetaminophen (TYLENOL) 500 mg tablet, Take 2 tablets (1,000 mg total) by mouth every 8 (eight) hours as needed for mild pain or moderate pain, Disp: 60 tablet, Rfl: 0    amLODIPine (NORVASC) 5 mg tablet, Take 1 tablet (5 mg total) by mouth daily, Disp: 90 tablet, Rfl: 3    aspirin (ECOTRIN LOW STRENGTH) 81 mg EC tablet, Take 1 tablet by mouth daily, Disp: , Rfl:     Cholecalciferol (VITAMIN D) 2000 units CAPS, Take by mouth, Disp: , Rfl:     Empagliflozin (Jardiance) 25 MG TABS, Take 1 tablet (25 mg total) by mouth every morning, Disp: 90 tablet, Rfl: 1    ibuprofen (MOTRIN) 800 mg tablet, Take 1 tablet (800 mg total) by mouth every 8  (eight) hours as needed for mild pain or moderate pain, Disp: 30 tablet, Rfl: 0    irbesartan (AVAPRO) 75 mg tablet, Take 1 tablet (75 mg total) by mouth daily, Disp: 90 tablet, Rfl: 1    metFORMIN (GLUCOPHAGE) 1000 MG tablet, Take 1,000 mg by mouth daily with breakfast, Disp: , Rfl:     metoprolol succinate (TOPROL-XL) 25 mg 24 hr tablet, TAKE 1 TABLET DAILY, Disp: 90 tablet, Rfl: 1    nitroglycerin (NITROSTAT) 0.4 mg SL tablet, Place 1 tablet (0.4 mg total) under the tongue every 5 (five) minutes as needed for chest pain, Disp: 90 tablet, Rfl: 3    NON FORMULARY, Cinn supp, Disp: , Rfl:     rosuvastatin (CRESTOR) 40 MG tablet, TAKE 1 TABLET DAILY, Disp: 90 tablet, Rfl: 1    semaglutide, 1 mg/dose, (Ozempic, 1 MG/DOSE,) 4 mg/3 mL injection pen, Inject 0.75 mL (1 mg total) under the skin every 7 days, Disp: 9 mL, Rfl: 1    tadalafil (CIALIS) 10 MG tablet, Take 10 mg by mouth as needed, Disp: , Rfl:     ticagrelor (BRILINTA) 90 MG, Take 1 tablet (90 mg total) by mouth 2 (two) times a day, Disp: 180 tablet, Rfl: 0    zolpidem (AMBIEN) 10 mg tablet, one tab at bedtime as needed, Disp: , Rfl:   Allergies   Allergen Reactions    Banana - Food Allergy Throat Swelling    Tetanus Toxoid Other (See Comments)     Vitals:    02/25/25 1621   BP: 116/60   BP Location: Right arm   Patient Position: Sitting   Cuff Size: Standard   Pulse: 80   SpO2: 96%   Weight: 92.6 kg (204 lb 3.2 oz)   Height: 6' (1.829 m)     Weight (last 2 days)       Date/Time Weight    02/25/25 1621 92.6 (204.2)           Blood pressure 116/60, pulse 80, height 6' (1.829 m), weight 92.6 kg (204 lb 3.2 oz), SpO2 96%., Body mass index is 27.69 kg/m².    Labs:  Admission on 01/13/2025, Discharged on 01/13/2025   Component Date Value    POC Glucose 01/13/2025 127     POC Glucose 01/13/2025 125    Hospital Outpatient Visit on 11/25/2024   Component Date Value    RAA A4C 11/25/2024 17.4     LA Volume Index (BP) 11/25/2024 29.8     MV Peak A Alejandro 11/25/2024 0.77      MV stenosis pressure 1/2* 11/25/2024 48     MV Peak E Alejandro 11/25/2024 60     E wave deceleration time 11/25/2024 167     E/A ratio 11/25/2024 0.78     MV valve area p 1/2 meth* 11/25/2024 4.58     RVID d 11/25/2024 4.0     A4C EF 11/25/2024 59     Left ventricular stroke * 11/25/2024 76.00     IVSd 11/25/2024 0.80     Tricuspid annular plane * 11/25/2024 3.00     Ao root 11/25/2024 3.70     LVPWd 11/25/2024 1.00     LA size 11/25/2024 4     Asc Ao 11/25/2024 3.8     LA volume (BP) 11/25/2024 64     FS 11/25/2024 37     LVIDS 11/25/2024 3.10     IVS 11/25/2024 0.8     LVIDd 11/25/2024 4.90     LA length (A2C) 11/25/2024 6.10     LEFT VENTRICLE SYSTOLIC * 11/25/2024 37     LV DIASTOLIC VOLUME (MOD* 11/25/2024 113     Left Atrium Area-systoli* 11/25/2024 16     Left Atrium Area-systoli* 11/25/2024 24     MV E' Tissue Velocity La* 11/25/2024 17     MV E' Tissue Velocity Se* 11/25/2024 11     LVSV, 2D 11/25/2024 76     BSA 11/25/2024 2.15     LV EF 11/25/2024 60     Est. RA pres 11/25/2024 5.0    Appointment on 11/15/2024   Component Date Value    Hemoglobin A1C 11/15/2024 6.7 (H)     EAG 11/15/2024 146     Sodium 11/15/2024 144     Potassium 11/15/2024 4.1     Chloride 11/15/2024 107     CO2 11/15/2024 27     ANION GAP 11/15/2024 10     BUN 11/15/2024 21     Creatinine 11/15/2024 0.90     Glucose, Fasting 11/15/2024 111 (H)     Calcium 11/15/2024 9.5     AST 11/15/2024 22     ALT 11/15/2024 15     Alkaline Phosphatase 11/15/2024 65     Total Protein 11/15/2024 7.9     Albumin 11/15/2024 4.8     Total Bilirubin 11/15/2024 0.34     eGFR 11/15/2024 93     Vit D, 25-Hydroxy 11/15/2024 34.7     Creatinine, Ur 11/15/2024 84.4     Albumin,U,Random 11/15/2024 54.1 (H)     Albumin Creat Ratio 11/15/2024 64 (H)     CRP 11/15/2024 <1.0     Cholesterol 11/15/2024 112     Triglycerides 11/15/2024 120     HDL, Direct 11/15/2024 26 (L)     LDL Calculated 11/15/2024 62     Non-HDL-Chol (CHOL-HDL) 11/15/2024 86    Admission on  10/21/2024, Discharged on 10/21/2024   Component Date Value    POC Glucose 10/21/2024 176 (H)     POC Glucose 10/21/2024 146 (H)      Imaging: XR hand 3+ vw right  Result Date: 1/28/2025  Narrative: RIGHT HAND INDICATION:   Encounter for other orthopedic aftercare. COMPARISON: 7/19/2024 VIEWS:  XR HAND 3+ VW RIGHT Images: 3 For the purposes of institution wide universal language the following terms will apply: (thumb=1st digit/finger, index finger=2nd digit/finger, long finger=3rd digit/finger, ring=4th digit/finger and small finger=5th digit/finger) FINDINGS: There is no acute fracture or dislocation. Interval resection arthroplasty thumb CMC joint with tight rope fixation of the thumb metacarpal to the base index finger metacarpal without radiographic complication. No lytic or blastic osseous lesion. Soft tissues are unremarkable.     Impression: Interval thumb CMC arthroplasty without radiographic complication. Electronically signed: 01/28/2025 09:41 AM Pedro Meza MPH,MD      Review of Systems:  Review of Systems   Constitutional:  Negative for diaphoresis, fatigue, fever and unexpected weight change.   HENT: Negative.     Respiratory:  Negative for cough, shortness of breath and wheezing.    Cardiovascular:  Negative for chest pain, palpitations and leg swelling.   Gastrointestinal:  Negative for abdominal pain, diarrhea and nausea.   Musculoskeletal:  Negative for gait problem and myalgias.   Skin:  Negative for rash.   Neurological:  Negative for dizziness and numbness.   Psychiatric/Behavioral: Negative.         Physical Exam:  Physical Exam  Constitutional:       Appearance: He is well-developed.   HENT:      Head: Normocephalic and atraumatic.   Eyes:      Pupils: Pupils are equal, round, and reactive to light.   Neck:      Vascular: No JVD.   Cardiovascular:      Rate and Rhythm: Regular rhythm.      Pulses: Normal pulses.           Carotid pulses are 2+ on the right side and 2+ on the left side.      Heart sounds: S1 normal and S2 normal.   Pulmonary:      Effort: Pulmonary effort is normal.      Breath sounds: Normal breath sounds. No wheezing or rales.   Abdominal:      General: Bowel sounds are normal.      Palpations: Abdomen is soft.   Musculoskeletal:         General: No tenderness. Normal range of motion.      Cervical back: Normal range of motion and neck supple.   Skin:     General: Skin is warm.   Neurological:      Mental Status: He is alert and oriented to person, place, and time.      Cranial Nerves: No cranial nerve deficit.      Deep Tendon Reflexes: Reflexes are normal and symmetric.

## 2025-02-26 NOTE — TELEPHONE ENCOUNTER
Caller: W/C    Doctor/Office: Dr abraham     What needs to be faxed: Office note from 2/25/25  Fax#: [phone]      Documents were successfully e-faxed

## 2025-02-27 ENCOUNTER — OFFICE VISIT (OUTPATIENT)
Facility: CLINIC | Age: 60
End: 2025-02-27
Payer: OTHER MISCELLANEOUS

## 2025-02-27 DIAGNOSIS — G56.01 CARPAL TUNNEL SYNDROME ON RIGHT: ICD-10-CM

## 2025-02-27 DIAGNOSIS — M18.11 PRIMARY OSTEOARTHRITIS OF FIRST CARPOMETACARPAL JOINT OF RIGHT HAND: Primary | ICD-10-CM

## 2025-02-27 DIAGNOSIS — G56.21 CUBITAL TUNNEL SYNDROME ON RIGHT: ICD-10-CM

## 2025-02-27 PROCEDURE — 97112 NEUROMUSCULAR REEDUCATION: CPT | Performed by: PHYSICAL THERAPIST

## 2025-02-27 PROCEDURE — 97140 MANUAL THERAPY 1/> REGIONS: CPT | Performed by: PHYSICAL THERAPIST

## 2025-02-27 PROCEDURE — 97035 APP MDLTY 1+ULTRASOUND EA 15: CPT | Performed by: PHYSICAL THERAPIST

## 2025-02-27 PROCEDURE — 97110 THERAPEUTIC EXERCISES: CPT | Performed by: PHYSICAL THERAPIST

## 2025-02-27 NOTE — PROGRESS NOTES
Daily Note     Today's date: 2025  Patient name: Fidel Calderon  : 1965  MRN: [id number]  Referring provider: Papo Castillo,*  Dx:   Encounter Diagnosis     ICD-10-CM    1. Primary osteoarthritis of first carpometacarpal joint of right hand  M18.11       2. Carpal tunnel syndrome on right  G56.01       3. Cubital tunnel syndrome on right  G56.21                      Subjective: pt was seen by his physician and he will dc the splint and continue AROM and strengthening      Objective: See treatment diary below    HFO dc'd  AROM right elbow E/F- 0/140; FA S/P- 70/70; wrist E/F- 50/45                      Thumb MP- 0/30; IP- 0/40                      IF- MP- 0/70; PIP- 0/70; DIP- 0/60 (tight/painful flexion at MCP)  Inspection- pt wearing his HFO daytime as needed   Circumference at wrist- 21.0  cm; Mps- 22.2 cm; thumb P1- 8.5 cm; MF P1- 7.3 cm  Sensation- pt notes no tingling in digits, occasional sharp shooting pain in thumb   Strength-  II- R/L- 30/90; 3 ALISSA- 4/15; Key-     Assessment: Tolerated treatment well. Patient would benefit from continued PT      Plan: Continue per plan of care.      Precautions: wear orthosis as directed    Manuals           STM 15 15 15 15 15 15  15  15  15     Tubigrip FA F                     WHFO 1-R                     AAROM W/D   / 2/ 2/5 2/5  2/5  2/5  2/5      TP HEP  Tan                                                                      Neuro Re-Ed                       HP/pulsed biph 15 15 15 15 15 15  15  15  15                                                     Ther Ex                       AROM Wrist  2/ 2/ 2/5 2/5 2/5 2/5  2/5  2/5  2/5     T all / 2/ 2/5 2/5 2/5 2/5  2/5  2/5  2/5     TGE / 2/ 3x 2/ 2/5 2/5  2/5  2/5  2/5     TP  T 2' T 2' T 2'      T 2'    TP 5 finger T 2/10 T 2/10 T 2/10      T 2/10    Nibs  --->  --> Blue 5       --->  --->   Thumbciser E/F  50% 2/10  50% 2/10  50% 2/10   0% 2/10 0% 2/10  0%  2/10  0% 2/10  50% 2/10     twister  20/20  20/20  20/20     20/20  20/20  20/20  20/20      Flexbar    Y 20/20  Y 20/20                  David    20 2/10  20 2/10                                                                                                                                         Modalities                                      CP 15 15 15 15 15 15  def  def

## 2025-03-04 ENCOUNTER — OFFICE VISIT (OUTPATIENT)
Facility: CLINIC | Age: 60
End: 2025-03-04
Payer: OTHER MISCELLANEOUS

## 2025-03-04 DIAGNOSIS — M18.11 PRIMARY OSTEOARTHRITIS OF FIRST CARPOMETACARPAL JOINT OF RIGHT HAND: Primary | ICD-10-CM

## 2025-03-04 DIAGNOSIS — G56.01 CARPAL TUNNEL SYNDROME ON RIGHT: ICD-10-CM

## 2025-03-04 DIAGNOSIS — G56.21 CUBITAL TUNNEL SYNDROME ON RIGHT: ICD-10-CM

## 2025-03-04 PROCEDURE — 97110 THERAPEUTIC EXERCISES: CPT | Performed by: PHYSICAL THERAPIST

## 2025-03-04 PROCEDURE — 97140 MANUAL THERAPY 1/> REGIONS: CPT | Performed by: PHYSICAL THERAPIST

## 2025-03-04 PROCEDURE — 97112 NEUROMUSCULAR REEDUCATION: CPT | Performed by: PHYSICAL THERAPIST

## 2025-03-04 NOTE — PROGRESS NOTES
Daily Note     Today's date: 3/4/2025  Patient name: Fidel Calderon  : 1965  MRN: [id number]  Referring provider: Papo Castillo,*  Dx:   Encounter Diagnosis     ICD-10-CM    1. Primary osteoarthritis of first carpometacarpal joint of right hand  M18.11       2. Carpal tunnel syndrome on right  G56.01       3. Cubital tunnel syndrome on right  G56.21                      Subjective: less soreness, but increased thenar swelling      Objective: See treatment diary below    HFO dc'd  AROM right elbow E/F- 0/140; FA S/P- 70/70; wrist E/F- 50/45                      Thumb MP- 0/30; IP- 0/40                      IF- MP- 0/70; PIP- 0/70; DIP- 0/60 (tight/painful flexion at MCP)  Circumference at wrist- 21.0  cm; Mps- 22.2 cm; thumb P1- 8.5 cm; MF P1- 7.3 cm  Sensation- pt notes no tingling in digits, occasional sharp shooting pain in thumb   Strength-  II- R/L- ; 3 ALISSA- 4/15; Key-      Assessment: Tolerated treatment well. Patient would benefit from continued PT      Plan: Continue per plan of care.      Precautions: wear orthosis as directed    Manuals  3/4         STM 15 15 15 15 15 15  15  15  15     Tubigrip FA F                     WHFO 1-R                     AAROM W/D   / 2/ 2/ 2/5  2/5  2/5  2/5      TP HEP  Tan                                                                      Neuro Re-Ed                       HP/pulsed biph 15 15 15 15 15 15  15  15  15                                                     Ther Ex                       AROM Wrist  2/ 2/5 2/5 2/5 2/5 2/5  2/5  2/5  2/5     T all /5 2/5 2/5 2/5 2/5 2/5  2/5  2/5  2/5     TGE / 2/5 3x 2/5 2/5 2/5  2/5  2/5  2/5     TP  T 2' T 2' T 2' T 2'     T 2'    TP 5 finger T 2/10 T 2/10 T 2/10 T 2/10     T 2/10    Nibs  --->  --> Blue 5       --->  --->   Thumbciser E/F  50% 2/10  50% 2/10  50% 2/10  50% 2/10 0% 2/10 0% 2/10  0% 2/10  0% 2/10  50% 2/10     twister     20/20  20/20  20/20      Flexbar    Y 20/20  Y 20/20 Y 20/20                David    20 2/10  20 2/10  20 2/10                                                                                                                                       Modalities                                      CP 15 15 15 15 15 15  def  def

## 2025-03-04 NOTE — PROGRESS NOTES
PT Re-Evaluation     Today's date: 3/4/2025  Patient name: Fidel Calderon  : 1965  MRN: [id number]  Referring provider: Papo Castillo,*  Dx:   Encounter Diagnosis     ICD-10-CM    1. Primary osteoarthritis of first carpometacarpal joint of right hand  M18.11       2. Carpal tunnel syndrome on right  G56.01       3. Cubital tunnel syndrome on right  G56.21                      Assessment    Assessment details: Pt is a 60 YO male presenting to PT with pain, decreased AROM, strength and tolerance to activity.  Pt would benefit from skilled intervention to address these issues and maximize overall function.  Occupation- [employer]- currently off  Dominant- left ; Involved- right thumb,  CT, CuT     Pt progressing steadily with AROM of the thumb.  Mild pain persists with motion and activity.  Pt has weaned from his spica in favor of function.      Goals    ST.  Decrease pain to 0-2/10 in 4-8 weeks to ease ADL and self care            2.  Decrease swelling 0.5 cm in 4-8 weeks            3.  Increase AROM 10-20 degrees in 4 weeks for improved ability to bathe, dress, and assist in functional activity            4.  Provide orthotic for protection, compression for support            5.  Instruct in activity modification for ADL and self care with independence in 4-6 weeks            6.  Instruct in HEP   LT.  Increase functional AROM to assist with independence in 8-12 weeks            2.  [place] with HEP in 4-6 weeks            3.  Increase  strength by 2-5 lbs in 8 weeks            4. Recreational activities are improved to maximum level in 12 weeks.            5.  ADL performance is improved to maximal level of function in 12 weeks.            6. Ability to RTW by DC        Plan  Patient would benefit from: skilled physical therapy  Planned modality interventions: cryotherapy, thermotherapy: hydrocollator packs and ultrasound    Planned therapy interventions: activity  modification, manual therapy, strengthening, stretching, therapeutic activities, therapeutic exercise and home exercise program    Frequency: 2x week  Duration in weeks: 4  Treatment plan discussed with: patient      Subjective Evaluation    History of Present Illness  Date of surgery: 2025  Mechanism of injury: Pt with persistent pain in his right thumb, numbness and tingling in his right hand.  Dr. Castillo completed surgery for right thumb CMC tight rope suspension plasty, Carpal and cubital tunnel release omn the right  Pt with history of left CT and CuT release 10/24.    Patient Goals  Patient goals for therapy: decreased edema, increased motion, decreased pain, increased strength, independence with ADLs/IADLs, return to sport/leisure activities and return to work    Pain  Current pain ratin  At best pain ratin  At worst pain ratin  Quality: dull ache and sharp    Hand dominance: left    Treatments  Current treatment: physical therapy      Objective     General Comments:      Wrist/Hand Comments  HFO dc'd  AROM right elbow E/F- 0/140; FA S/P- 70/70; wrist E/F- 65/45                      Thumb MP- 0/35; IP- 0/35;  -6.0 cm base of 5th                      IF- MP- 0/85; PIP- 0/85; DIP- 0/55   Circumference at wrist- 20.5  cm; Mps- 22.0 cm; thumb P1- 8.2 cm; MF P1- 7.1 cm  Sensation- pt notes no tingling in digits, occasional sharp shooting pain in thumb   Strength-  II- R/L- 30/90; 3 ALISSA- 4/15; Key-              Precautions: wear orthosis as directed    Manuals   3               STM 15 15 15 15 15 15  15  15  15     Tubigrip FA F                     WHFO 1-R                     AAROM W/D         TP HEP  Tan                                                                      Neuro Re-Ed                       HP/pulsed biph 15 15 15 15 15 15  15  15  15                                                     Ther Ex                       AROM  Wrist  2/5 2/5 2/5 2/5 2/5 2/5  2/5  2/5  2/5     T all 2/5 2/5 2/5 2/5 2/5 2/5  2/5  2/5  2/5     TGE 2/5 2/5 3x 2/5 2/5 2/5  2/5  2/5  2/5     TP  T 2' T 2' T 2'  T 2'         T 2'     TP 5 finger T 2/10 T 2/10 T 2/10  T 2/10         T 2/10     Thumbciser E/F  50% 2/10  50% 2/10  50% 2/10  50% 2/10 0% 2/10 0% 2/10  0% 2/10  0% 2/10  50% 2/10     twister  20/20  20/20  20/20  20/20   20/20  20/20  20/20  20/20      Flexbar    Y 20/20  Y 20/20  Y 20/20                David    20 2/10  20 2/10  20 2/10                                                                                                                                       Modalities                                               CP 15 15 15 def 15 15  def  def

## 2025-03-06 ENCOUNTER — OFFICE VISIT (OUTPATIENT)
Facility: CLINIC | Age: 60
End: 2025-03-06
Payer: OTHER MISCELLANEOUS

## 2025-03-06 DIAGNOSIS — M18.11 PRIMARY OSTEOARTHRITIS OF FIRST CARPOMETACARPAL JOINT OF RIGHT HAND: Primary | ICD-10-CM

## 2025-03-06 DIAGNOSIS — G56.01 CARPAL TUNNEL SYNDROME ON RIGHT: ICD-10-CM

## 2025-03-06 PROCEDURE — 97112 NEUROMUSCULAR REEDUCATION: CPT

## 2025-03-06 PROCEDURE — 97140 MANUAL THERAPY 1/> REGIONS: CPT

## 2025-03-06 PROCEDURE — 97110 THERAPEUTIC EXERCISES: CPT

## 2025-03-06 NOTE — PROGRESS NOTES
"Daily Note     Today's date: 3/6/2025  Patient name: Fidel Calderon  : 1965  MRN: [id number]  Referring provider: Papo Castillo,*  Dx:   Encounter Diagnosis     ICD-10-CM    1. Primary osteoarthritis of first carpometacarpal joint of right hand  M18.11       2. Carpal tunnel syndrome on right  G56.01                      Subjective: Pt reports some soreness around the thumb area.\"Still swollen that comes and goes\"      Objective: See treatment diary below    HFO dc'd  AROM right elbow E/F- 0/140; FA S/P- 70/70; wrist E/F- 65/45                      Thumb MP- 0/35; IP- 0/35;  -6.0 cm base of 5th                      IF- MP- 0/85; PIP- 0/85; DIP- 0/55   Circumference at wrist- 20.5  cm; Mps- 22.0 cm; thumb P1- 8.2 cm; MF P1- 7.1 cm  Sensation- pt notes no tingling in digits, occasional sharp shooting pain in thumb   Strength-  II- R/L- 30; 3 ALISSA- 4/15; Key-   Assessment: Tolerated treatment well. Patient would benefit from continued PT      Plan: Progress treatment as tolerated.       Precautions: wear orthosis as directed    Manuals   3/  3/6             STM 15 15 15 15 15 15  15  15  15     Tubigrip FA F                     WHFO 1-R                     AAROM W/D   / 2/ 2/5 2/5  2/5  2/5  2/5      TP HEP  Tan                                                                      Neuro Re-Ed                       HP/pulsed biph 15 15 15 15 15 15  15  15  15                                                     Ther Ex                       AROM Wrist  2/ 2/5 2/5 2/5 2/5 2/5  2/5  2/5  2/5     T all 2/5 2/5 2/5 2/5 2/5 2/5  2/5  2/5  2/5     TGE / 2/5 3x 2/5 2/5 2/5  2/5  2/5  2/5     TP  T 2' T 2' T 2'  T 2'  T2'       T 2'     TP 5 finger T 2/10 T 2/10 T 2/10  T 2/10  T 2/10       T 2/10     Thumbciser E/F  50% 2/10  50% /10  50% /10  50% 2/10 50% 2/10 0% 2/10  0% 2/10  0% 2/10  50% 2/10     twister  20/20  20/20  20/20  20/20  20/20 20/20  20/20  20/20  20/20   "    Flexbar    Y 20/20  Y 20/20  Y 20/20  Y 2/10              David    20 2/10  20 2/10  20 2/10  20 2/10                                                                                                                                     Modalities                                               CP 15 15 15 def 15 15  def  def

## 2025-03-11 ENCOUNTER — OFFICE VISIT (OUTPATIENT)
Facility: CLINIC | Age: 60
End: 2025-03-11
Payer: OTHER MISCELLANEOUS

## 2025-03-11 DIAGNOSIS — M18.11 PRIMARY OSTEOARTHRITIS OF FIRST CARPOMETACARPAL JOINT OF RIGHT HAND: Primary | ICD-10-CM

## 2025-03-11 DIAGNOSIS — G56.01 CARPAL TUNNEL SYNDROME ON RIGHT: ICD-10-CM

## 2025-03-11 DIAGNOSIS — G56.21 CUBITAL TUNNEL SYNDROME ON RIGHT: ICD-10-CM

## 2025-03-11 PROCEDURE — 97112 NEUROMUSCULAR REEDUCATION: CPT

## 2025-03-11 PROCEDURE — 97110 THERAPEUTIC EXERCISES: CPT

## 2025-03-11 PROCEDURE — 97140 MANUAL THERAPY 1/> REGIONS: CPT

## 2025-03-11 NOTE — PROGRESS NOTES
"Daily Note     Today's date: 3/11/2025  Patient name: Fidel Calderon  : 1965  MRN: [id number]  Referring provider: Papo Castillo,*  Dx:   Encounter Diagnosis     ICD-10-CM    1. Primary osteoarthritis of first carpometacarpal joint of right hand  M18.11       2. Carpal tunnel syndrome on right  G56.01       3. Cubital tunnel syndrome on right  G56.21                      Subjective: Pt notes \"thumb discomfort and wrist stiffness soreness as CC.\" Compliant with TP at home and admits difficulty.\"      Objective: See treatment diary below    HFO dc'd  AROM right elbow E/F- 0/140; FA S/P- 70/70; wrist E/F- 65/45                      Thumb MP- 0/35; IP- 0/35;  -6.0 cm base of 5th                      IF- MP- 0/85; PIP- 0/85; DIP- 0/55   Circumference at wrist- 20.5  cm; Mps- 22.0 cm; thumb P1- 8.2 cm; MF P1- 7.1 cm  Sensation- pt notes no tingling in digits, occasional sharp shooting pain in thumb   Strength-  II- R/L- 30/90; 3 ALISSA- /15; Key-   Assessment: Tolerated treatment well. Patient would benefit from continued PT      Plan: Progress treatment as tolerated.       Precautions: wear orthosis as directed    Manuals   3  3/6  3/11           STM 15 15 15 15 15 15  15  15  15     Tubigrip FA F                     WHFO 1-R                     AAROM W/D   / 2/5 2/5 2/5  2/5  2/5  2/5      TP HEP  Tan                                                                      Neuro Re-Ed                       HP/pulsed biph 15 15 15 15 15 15  15  15  15                                                     Ther Ex                       AROM Wrist   2/ 2/ 2/ 2/5 2/5  2/5  2/5  2/5     T all / 2/ 2/5 2/5 2/5  2/5  2/5  2/5     TGE  2/ 3x 2/ 2/ 2/  2/5  2/5  2/5     TP  T 2' T 2' T 2'  T 2'  T2'  T 2'     T 2'     TP 5 finger T 2/10 T 2/10 T 2/10  T 2/10  T 2/10  T 2/10     T 2/10     Thumbciser E/F  50% 2/10  50% 2/10  50% 2/10  50% 2/10 50% 2/10 50% 2/10  0% 2/10 "  0% 2/10  50% 2/10     twister  20/20  20/20  20/20  20/20  20/20 20/20  20/20  20/20  20/20      Flexbar    Y 20/20  Y 20/20  Y 20/20  Y 2/10  Y 2/10            David    20 2/10  20 2/10  20 2/10  20 2/10  20 2/10                                                                                                                                   Modalities                                               CP 15 15 15 def 15 DEF time  def  def

## 2025-03-13 ENCOUNTER — OFFICE VISIT (OUTPATIENT)
Facility: CLINIC | Age: 60
End: 2025-03-13
Payer: OTHER MISCELLANEOUS

## 2025-03-13 DIAGNOSIS — G56.21 CUBITAL TUNNEL SYNDROME ON RIGHT: ICD-10-CM

## 2025-03-13 DIAGNOSIS — M18.11 PRIMARY OSTEOARTHRITIS OF FIRST CARPOMETACARPAL JOINT OF RIGHT HAND: Primary | ICD-10-CM

## 2025-03-13 DIAGNOSIS — G56.01 CARPAL TUNNEL SYNDROME ON RIGHT: ICD-10-CM

## 2025-03-13 PROCEDURE — 97140 MANUAL THERAPY 1/> REGIONS: CPT | Performed by: PHYSICAL THERAPIST

## 2025-03-13 PROCEDURE — 97110 THERAPEUTIC EXERCISES: CPT | Performed by: PHYSICAL THERAPIST

## 2025-03-13 PROCEDURE — 97112 NEUROMUSCULAR REEDUCATION: CPT | Performed by: PHYSICAL THERAPIST

## 2025-03-13 NOTE — PROGRESS NOTES
Daily Note     Today's date: 3/13/2025  Patient name: Fidel Calderon  : 1965  MRN: [id number]  Referring provider: Papo Castillo,*  Dx:   Encounter Diagnosis     ICD-10-CM    1. Primary osteoarthritis of first carpometacarpal joint of right hand  M18.11       2. Cubital tunnel syndrome on right  G56.21       3. Carpal tunnel syndrome on right  G56.01                      Subjective: pt still with soreness in thumb and wrist      Objective: See treatment diary below    HFO dc'd  AROM right elbow E/F- 0/140; FA S/P- 70/70; wrist E/F- 65/45                      Thumb MP- 0/35; IP- 0/45;  -6.0 cm base of 5th                      IF- MP- 0/85; PIP- 0/85; DIP- 0/55   Circumference at wrist- 20.5  cm; Mps- 22.0 cm; thumb P1- 8.2 cm; MF P1- 7.1 cm  Sensation- pt notes no tingling in digits, occasional sharp shooting pain in thumb   Strength-  II- R/L- ; 3 ALISSA- ; Key- 3/22    Assessment: Tolerated treatment well. Patient would benefit from continued PT      Plan: Continue per plan of care.      Precautions: wear orthosis as directed    Manuals   3/  3/6  3/11  3/13         STM 15 15 15 15 15 15  15  15  15     Tubigrip FA F                     WHFO 1-R                     AAROM W/D   / 2/ 2/ 2/  2/5  2/5  2/5      TP HEP  Tan                                                                      Neuro Re-Ed                       HP/pulsed biph 15 15 15 15 15 15  15  15  15                                                     Ther Ex                       AROM Wrist  2/ 2/5 2/5 2/5 2/5 2/5  2/5  2/5  2/5     T all 2/5 2/5 2/5 2/5 2/5 2/5  2/5  2/5  2/5     TGE 2/ 2/5 3x 2/5 2/5 2/5  2/5  2/5  2/5     TP  T 2' T 2' T 2'  T 2'  T2'  T 2'  T 2'   T 2'     TP 5 finger T 2/10 T 2/10 T 2/10  T 2/10  T 2/10  T 2/10  T 2'   T 2/10     Thumbciser E/F  50% 2/10  50% 2/10  50% 2/10  50% 2/10 50% 2/10 50% /10  50% /10  0% /10  50% /10      Flexbar    Y 20/20  Y 20/20  Y 20/20  Y  2/10  Y 2/10  G 20/20          David    20 2/10  20 2/10  20 2/10  20 2/10  20 2/10  25 2/10         Blue       IV 2/10      DB E/F       4 2/10       digiflex opp              R 2/10                                                                                                         Modalities                                               CP 15 15 15 def 15 DEF time  def  def

## 2025-03-18 ENCOUNTER — OFFICE VISIT (OUTPATIENT)
Facility: CLINIC | Age: 60
End: 2025-03-18
Payer: OTHER MISCELLANEOUS

## 2025-03-18 DIAGNOSIS — G56.01 CARPAL TUNNEL SYNDROME ON RIGHT: ICD-10-CM

## 2025-03-18 DIAGNOSIS — G56.21 CUBITAL TUNNEL SYNDROME ON RIGHT: ICD-10-CM

## 2025-03-18 DIAGNOSIS — M18.11 PRIMARY OSTEOARTHRITIS OF FIRST CARPOMETACARPAL JOINT OF RIGHT HAND: Primary | ICD-10-CM

## 2025-03-18 PROCEDURE — 97140 MANUAL THERAPY 1/> REGIONS: CPT | Performed by: PHYSICAL THERAPIST

## 2025-03-18 PROCEDURE — 97110 THERAPEUTIC EXERCISES: CPT | Performed by: PHYSICAL THERAPIST

## 2025-03-18 PROCEDURE — 97112 NEUROMUSCULAR REEDUCATION: CPT | Performed by: PHYSICAL THERAPIST

## 2025-03-18 NOTE — PROGRESS NOTES
Daily Note     Today's date: 3/18/2025  Patient name: Fidel Calderon  : 1965  MRN: [id number]  Referring provider: Papo Castillo,*  Dx:   Encounter Diagnosis     ICD-10-CM    1. Primary osteoarthritis of first carpometacarpal joint of right hand  M18.11       2. Cubital tunnel syndrome on right  G56.21       3. Carpal tunnel syndrome on right  G56.01                      Subjective:    pt notes improved use, however, stiffness and soreness of the thumb CMC limits function       Objective: See treatment diary below    HFO dc'd  AROM right elbow E/F- 0/140; FA S/P- 70/70; wrist E/F- 65/45                      Thumb MP- 0/35; IP- 0/45;  -6.0 cm base of 5th                      IF- MP- 0/85; PIP- 0/85; DIP- 0/55   Circumference at wrist- 20.5  cm; Mps- 22.0 cm; thumb P1- 8.2 cm; MF P1- 7.1 cm  Sensation- pt notes no tingling in digits, occasional sharp shooting pain in thumb   Strength-  II- R/L- ; 3 ALISSA- ; Key- 3/22     Assessment: Tolerated treatment well.  . Patient would benefit from continued PT      Plan: Continue per plan of care.      Precautions;  none    Manuals 3/18              STM 15 15 15 15 15 15  15  15  15      F                      1-R                        2/5 2/5 2/5 2/5 2/5  2/5  2/5  2/5       Tan                                                                      Neuro Re-Ed                       HP/pulsed biph 15 15 15 15 15 15  15  15  15                                                     Ther Ex                       TP  T 2' T 2' T 2'  T 2'  T2'  T 2'  T 2'   T 2'     TP 5 finger T 2/10 T 2/10 T 2/10  T 2/10  T 2/10  T 2/10  T 2'   T 2/10     Thumbciser E/F  100% 2/10  50% 2/10  50% 2/10  50% 2/10 50% 2/10 50% 2/10  50% 2/10  0% 2/10  50% 2/10      Flexbar  G 20/20  Y 20/20  Y 20/20  Y 20/20  Y 2/10  Y 2/10  G /20          David  25 2/10  20 2/10  20 2/10  20 2/10  20 2/10  20 /10  25 /10         Blue IV /10      IV 2/10      DB E/F 4 10      4 2/10        digiflex opp  R 2/10            R 2/10                                                                                                         Modalities

## 2025-03-20 ENCOUNTER — TELEPHONE (OUTPATIENT)
Age: 60
End: 2025-03-20

## 2025-03-20 ENCOUNTER — OFFICE VISIT (OUTPATIENT)
Facility: CLINIC | Age: 60
End: 2025-03-20
Payer: OTHER MISCELLANEOUS

## 2025-03-20 DIAGNOSIS — M18.11 PRIMARY OSTEOARTHRITIS OF FIRST CARPOMETACARPAL JOINT OF RIGHT HAND: Primary | ICD-10-CM

## 2025-03-20 DIAGNOSIS — G56.21 CUBITAL TUNNEL SYNDROME ON RIGHT: ICD-10-CM

## 2025-03-20 PROCEDURE — 97140 MANUAL THERAPY 1/> REGIONS: CPT | Performed by: PHYSICAL THERAPIST

## 2025-03-20 PROCEDURE — 97112 NEUROMUSCULAR REEDUCATION: CPT | Performed by: PHYSICAL THERAPIST

## 2025-03-20 PROCEDURE — 97110 THERAPEUTIC EXERCISES: CPT | Performed by: PHYSICAL THERAPIST

## 2025-03-20 NOTE — TELEPHONE ENCOUNTER
Patient called to have labs faxed to [facility] on [facility] at [phone].    I sent via [employer].

## 2025-03-20 NOTE — PROGRESS NOTES
Daily Note     Today's date: 3/20/2025  Patient name: Fidel Calderon  : 1965  MRN: [id number]  Referring provider: Papo Castillo,*  Dx:   Encounter Diagnosis     ICD-10-CM    1. Primary osteoarthritis of first carpometacarpal joint of right hand  M18.11       2. Cubital tunnel syndrome on right  G56.21                      Subjective: pt doing more with his thumb and IF, but still feels stiffness and irritability during and after use      Objective: See treatment diary below    AROM right elbow E/F- 0/140; FA S/P- 70/70; wrist E/F- 65/45                      Thumb MP- 0/35; IP- 0/45;  -6.0 cm base of 5th                      IF- MP- 0/85; PIP- 0/85; DIP- 0/55   Circumference at wrist- 20.5  cm; Mps- 22.0 cm; thumb P1- 8.2 cm; MF P1- 7.1 cm  Sensation- pt notes no tingling in digits, occasional sharp shooting pain in thumb   Strength-  II- R/L- ; 3 ALISSA- ; Key- 3/22    Assessment: Tolerated treatment well. Patient would benefit from continued PT      Plan: Continue per plan of care.      Precautions;  none    Manuals 3/18 3/20             STM 15 15 15 15 15 15  15  15  15      F                      1-R                        2/5 2/5 2/5 2/5 2/5  2/5  2/5  2/5       Tan                                                                      Neuro Re-Ed                       HP/pulsed biph 15 15 15 15 15 15  15  15  15                                                     Ther Ex                       TP  T 2' T 2' T 2'  T 2'  T2'  T 2'  T 2'   T 2'     TP 5 finger T 2/10 T 2/10 T 2/10  T 2/10  T 2/10  T 2/10  T 2'   T 2/10     Thumbciser E/F  100% 2/10  100% 2/10  50% 2/10  50% 2/10 50% 2/10 50% 2/10  50% 2/10  0% 2/10  50% /10      Flexbar  G 20/20  G 20/20  Y 20/20  Y 20/20  Y 2/10  Y 2/10  G 20/20          David  25 2/10  25 2/10  20 2/10  20 2/10  20 2/10  20 2/10  25 /10         Blue IV 2/10 IV 2/10     IV 2/10      DB E/F 4 2/10 4 2/10     4 2/10       digiflex opp  R 2/10  R 2/10           R 2/10                                                                                                         Modalities

## 2025-03-25 ENCOUNTER — OFFICE VISIT (OUTPATIENT)
Facility: CLINIC | Age: 60
End: 2025-03-25
Payer: OTHER MISCELLANEOUS

## 2025-03-25 DIAGNOSIS — M18.11 PRIMARY OSTEOARTHRITIS OF FIRST CARPOMETACARPAL JOINT OF RIGHT HAND: Primary | ICD-10-CM

## 2025-03-25 DIAGNOSIS — G56.21 CUBITAL TUNNEL SYNDROME ON RIGHT: ICD-10-CM

## 2025-03-25 PROCEDURE — 97110 THERAPEUTIC EXERCISES: CPT | Performed by: PHYSICAL THERAPIST

## 2025-03-25 PROCEDURE — 97112 NEUROMUSCULAR REEDUCATION: CPT | Performed by: PHYSICAL THERAPIST

## 2025-03-25 PROCEDURE — 97140 MANUAL THERAPY 1/> REGIONS: CPT | Performed by: PHYSICAL THERAPIST

## 2025-03-25 NOTE — PROGRESS NOTES
Daily Note     Today's date: 3/25/2025  Patient name: Fidel Calderon  : 1965  MRN: [id number]  Referring provider: Papo Castillo,*  Dx:   Encounter Diagnosis     ICD-10-CM    1. Primary osteoarthritis of first carpometacarpal joint of right hand  M18.11       2. Cubital tunnel syndrome on right  G56.21                      Subjective: pt doing better, feeling as he is turning the corner      Objective: See treatment diary below    AROM right elbow E/F- 0/140; FA S/P- 70/70; wrist E/F- 65/45                      Thumb MP- 0/35; IP- 0/45;  -6.0 cm base of 5th                      IF- MP- 0/85; PIP- 0/85; DIP- 0/55   Circumference at wrist- 20.5  cm; Mps- 22.0 cm; thumb P1- 8.2 cm; MF P1- 7.1 cm  Sensation- pt notes no tingling in digits, occasional sharp shooting pain in thumb   Strength-  II- R/L- 45/110; 3 ALISSA- ; Key- 3/20    Assessment: Tolerated treatment well. Patient would benefit from continued PT      Plan: Continue per plan of care.      Precautions;  none    Manuals 3/18 3/20 3/25            STM 15 15 15 15 15 15  15  15  15      F                      1-R                        25 2 25 2/5 2/5  2/5  2/5  2       Tan                                                                      Neuro Re-Ed                       HP/pulsed biph 15 15 15 15 15 15  15  15  15                                                     Ther Ex                       TP  T 2' T 2' T/Y 2'  T 2'  T2'  T 2'  T 2'   T 2'     TP 5 finger T 2/10 T 2/10 T/Y 2/10  T 2/10  T 2/10  T 2/10  T 2'   T 2/10     Thumbciser E/F  100% 2/10  100% 2/10  100% 2/10  50% 2/10 50% 2/10 50% 2/10  50% /10  0% /10  50% /10      Flexbar  G 20/20  G 20/20  G 20/20  Y 20/20  Y 2/10  Y 2/10  G 20/20          David  25 2/10  25 2/10  25 2/10  20 2/10  20 2/10  20 /10  25 2/10         Blue IV 2/10 IV /10 IV /10    IV /10      DB E/F 4 /10 4 /10 4 /10    4 2/10       digiflex opp  R 2/10  R 10  R 2/10        R 2/10                                                                                                          Modalities

## 2025-03-26 LAB
ALBUMIN SERPL-MCNC: 5 G/DL (ref 3.5–5.7)
ALBUMIN/CREAT UR: 49.4
ALP SERPL-CCNC: 67 U/L (ref 35–120)
ALT SERPL-CCNC: 15 U/L
ANION GAP SERPL CALCULATED.3IONS-SCNC: 11 MMOL/L (ref 3–11)
AST SERPL-CCNC: 19 U/L
BILIRUB SERPL-MCNC: 0.4 MG/DL (ref 0.2–1)
BUN SERPL-MCNC: 23 MG/DL (ref 7–28)
CALCIUM SERPL-MCNC: 9.9 MG/DL (ref 8.5–10.5)
CHLORIDE SERPL-SCNC: 104 MMOL/L (ref 100–109)
CHOLEST SERPL-MCNC: 133 MG/DL
CHOLEST/HDLC SERPL: 5.1 {RATIO}
CO2 SERPL-SCNC: 24 MMOL/L (ref 21–31)
CREAT SERPL-MCNC: 0.89 MG/DL (ref 0.53–1.3)
CREAT UR-MCNC: 95.2 MG/DL (ref 50–200)
CYTOLOGY CMNT CVX/VAG CYTO-IMP: ABNORMAL
EST. AVERAGE GLUCOSE BLD GHB EST-MCNC: 163 MG/DL
GFR/BSA.PRED SERPLBLD CYS-BASED-ARV: 98 ML/MIN/{1.73_M2}
GLUCOSE SERPL-MCNC: 128 MG/DL (ref 65–99)
HBA1C MFR BLD: 7.3 %
HDLC SERPL-MCNC: 26 MG/DL (ref 23–92)
LDLC SERPL CALC-MCNC: 72 MG/DL
MICROALBUMIN UR-MCNC: 4.7 MG/DL
NONHDLC SERPL-MCNC: 107 MG/DL
POTASSIUM SERPL-SCNC: 4 MMOL/L (ref 3.5–5.2)
PROT SERPL-MCNC: 7.6 G/DL (ref 6.3–8.3)
SODIUM SERPL-SCNC: 139 MMOL/L (ref 135–145)
TRIGL SERPL-MCNC: 177 MG/DL

## 2025-03-27 ENCOUNTER — OFFICE VISIT (OUTPATIENT)
Facility: CLINIC | Age: 60
End: 2025-03-27
Payer: OTHER MISCELLANEOUS

## 2025-03-27 ENCOUNTER — RESULTS FOLLOW-UP (OUTPATIENT)
Dept: ENDOCRINOLOGY | Facility: CLINIC | Age: 60
End: 2025-03-27

## 2025-03-27 DIAGNOSIS — G56.21 CUBITAL TUNNEL SYNDROME ON RIGHT: ICD-10-CM

## 2025-03-27 DIAGNOSIS — M18.11 PRIMARY OSTEOARTHRITIS OF FIRST CARPOMETACARPAL JOINT OF RIGHT HAND: Primary | ICD-10-CM

## 2025-03-27 PROCEDURE — 97112 NEUROMUSCULAR REEDUCATION: CPT | Performed by: PHYSICAL THERAPIST

## 2025-03-27 PROCEDURE — 97110 THERAPEUTIC EXERCISES: CPT | Performed by: PHYSICAL THERAPIST

## 2025-03-27 PROCEDURE — 97140 MANUAL THERAPY 1/> REGIONS: CPT | Performed by: PHYSICAL THERAPIST

## 2025-03-27 NOTE — PROGRESS NOTES
PT Re-Evaluation     Today's date: 3/27/2025  Patient name: Fidel Calderon  : 1965  MRN: [id number]  Referring provider: Papo Castillo,*  Dx:   Encounter Diagnosis     ICD-10-CM    1. Primary osteoarthritis of first carpometacarpal joint of right hand  M18.11       2. Cubital tunnel syndrome on right  G56.21                      Assessment    Assessment details: Pt is a 60 YO male presenting to PT with pain, decreased AROM, strength and tolerance to activity.  Pt would benefit from skilled intervention to address these issues and maximize overall function.  Occupation- [employer]- currently off  Dominant- left ; Involved- right thumb,  CT, CuT     Pt progressing steadily with AROM of the thumb.  Mild pain persists with motion and activity.  Swelling decreasing as his activity level improves    Goals    ST.  Decrease pain to 0-2/10 in 4-8 weeks to ease ADL and self care            2.  Decrease swelling 0.5 cm in 4-8 weeks            3.  Increase AROM 10-20 degrees in 4 weeks for improved ability to bathe, dress, and assist in functional activity            4.  Provide orthotic for protection, compression for support            5.  Instruct in activity modification for ADL and self care with independence in 4-6 weeks            6.  Instruct in HEP   LT.  Increase functional AROM to assist with independence in 8-12 weeks            2.  [place] with HEP in 4-6 weeks            3.  Increase  strength by 2-5 lbs in 8 weeks            4. Recreational activities are improved to maximum level in 12 weeks.            5.  ADL performance is improved to maximal level of function in 12 weeks.            6. Ability to RTW by DC        Plan  Patient would benefit from: skilled physical therapy  Planned modality interventions: cryotherapy, thermotherapy: hydrocollator packs and ultrasound    Planned therapy interventions: activity modification, manual therapy, strengthening, stretching,  therapeutic activities, therapeutic exercise and home exercise program    Frequency: 2x week  Duration in weeks: 4  Treatment plan discussed with: patient        Subjective Evaluation    History of Present Illness  Date of surgery: 2025  Mechanism of injury: Pt with persistent pain in his right thumb, numbness and tingling in his right hand.  Dr. Castillo completed surgery for right thumb CMC tight rope suspension plasty, Carpal and cubital tunnel release omn the right  Pt with history of left CT and CuT release 10/24.    Patient Goals  Patient goals for therapy: decreased edema, increased motion, decreased pain, increased strength, independence with ADLs/IADLs, return to sport/leisure activities and return to work    Pain  Current pain ratin  At best pain ratin  At worst pain rating: 3  Quality: dull ache and sharp    Hand dominance: left    Treatments  Current treatment: physical therapy        Objective     General Comments:      Wrist/Hand Comments  AROM right elbow E/F- 0/140; FA S/P- 70/70; wrist E/F- 65/45                      Thumb MP- 0/35; IP- 0/45;  -6.0 cm base of 5th                      IF- MP- 0/85; PIP- 0/85; DIP- 0/55   Circumference at wrist- 20.5  cm; Mps- 22.0 cm; thumb P1- 8.2 cm; MF P1- 7.1 cm  Sensation- pt notes no tingling in digits, occasional sharp shooting pain in thumb   Strength-  II- R/L- 45/110; 3 ALISSA- ; Key- 3/20               Precautions: activity as tolerated    Manuals 3/18 3/20 3/25  3/27               STM 15 15 15 15 15 15  15  15  15       F                       1-R                                 Tan                                                                      Neuro Re-Ed                       HP/pulsed biph 15 15 15 15 15 15  15  15  15                                                     Ther Ex                       TP  T 2' T 2' T/Y 2'  T/Y 2'  T2'  T 2'  T 2'   T 2'     TP 5 finger T 2/10 T 2/10 T/Y 2/10   T/Y 2/10  T 2/10  T 2/10  T 2'   T 2/10     Thumbciser E/F  100% 2/10  100% 2/10  100% 2/10  100% 2/10 100% 2/10 100% 2/10  100% 2/10  0% 2/10  50% 2/10      Flexbar  G 20/20  G 20/20  G 20/20 G 20/20  G/B 2/10  G/B 2/10  G 20/20          David  25 2/10  25 2/10  25 2/10  25 2/10  35 2/10  35 2/10  25 2/10         Blue IV 2/10 IV 2/10 IV 2/10  IV 2/10  V 2/10  V IV 2/10         DB E/F 4 2/10 4 2/10 4 2/10  4 2/10  5 2/10  5 4 2/10          digiflex opp  R 2/10  R 2/10  R 2/10  R 2/10  R 2/10    R 2/10                                                                                                         Modalities

## 2025-04-01 ENCOUNTER — OFFICE VISIT (OUTPATIENT)
Dept: ENDOCRINOLOGY | Facility: CLINIC | Age: 60
End: 2025-04-01
Payer: COMMERCIAL

## 2025-04-01 ENCOUNTER — OFFICE VISIT (OUTPATIENT)
Facility: CLINIC | Age: 60
End: 2025-04-01
Payer: OTHER MISCELLANEOUS

## 2025-04-01 VITALS
OXYGEN SATURATION: 99 % | SYSTOLIC BLOOD PRESSURE: 112 MMHG | TEMPERATURE: 97.8 F | WEIGHT: 206.2 LBS | HEART RATE: 78 BPM | RESPIRATION RATE: 18 BRPM | HEIGHT: 72 IN | DIASTOLIC BLOOD PRESSURE: 70 MMHG | BODY MASS INDEX: 27.93 KG/M2

## 2025-04-01 DIAGNOSIS — E11.59 TYPE 2 DIABETES MELLITUS WITH OTHER CIRCULATORY COMPLICATION, WITHOUT LONG-TERM CURRENT USE OF INSULIN (HCC): ICD-10-CM

## 2025-04-01 DIAGNOSIS — E11.21 TYPE 2 DIABETES MELLITUS WITH DIABETIC NEPHROPATHY, WITHOUT LONG-TERM CURRENT USE OF INSULIN (HCC): Primary | ICD-10-CM

## 2025-04-01 DIAGNOSIS — I10 PRIMARY HYPERTENSION: ICD-10-CM

## 2025-04-01 DIAGNOSIS — R80.9 MICROALBUMINURIA: ICD-10-CM

## 2025-04-01 DIAGNOSIS — E78.2 MIXED HYPERLIPIDEMIA: ICD-10-CM

## 2025-04-01 DIAGNOSIS — M18.11 PRIMARY OSTEOARTHRITIS OF FIRST CARPOMETACARPAL JOINT OF RIGHT HAND: Primary | ICD-10-CM

## 2025-04-01 DIAGNOSIS — G56.21 CUBITAL TUNNEL SYNDROME ON RIGHT: ICD-10-CM

## 2025-04-01 PROCEDURE — 97035 APP MDLTY 1+ULTRASOUND EA 15: CPT | Performed by: PHYSICAL THERAPIST

## 2025-04-01 PROCEDURE — 99214 OFFICE O/P EST MOD 30 MIN: CPT | Performed by: STUDENT IN AN ORGANIZED HEALTH CARE EDUCATION/TRAINING PROGRAM

## 2025-04-01 PROCEDURE — 97112 NEUROMUSCULAR REEDUCATION: CPT | Performed by: PHYSICAL THERAPIST

## 2025-04-01 PROCEDURE — 97110 THERAPEUTIC EXERCISES: CPT | Performed by: PHYSICAL THERAPIST

## 2025-04-01 PROCEDURE — 97140 MANUAL THERAPY 1/> REGIONS: CPT | Performed by: PHYSICAL THERAPIST

## 2025-04-01 NOTE — PROGRESS NOTES
Name: Fidel Calderon      : 1965      MRN: [id number]  Encounter Provider: Arsenio Holliday MD  Encounter Date: 2025   Encounter department: [facility] [place]    Chief Complaint   Patient presents with   • Follow-up   :  Assessment & Plan  Type 2 diabetes mellitus with diabetic nephropathy, without long-term current use of insulin (HCC)    Lab Results   Component Value Date    HGBA1C 7.3 (H) 2025       A1c level has increased to 7.3%, likely due to recent inactivity and poor dietary choices. he is advised to increase physical activity, such as walking, to help lower blood sugar levels. She should also monitor his blood glucose levels daily, aiming for fasting levels between  mg/dL and postprandial levels below 140 mg/dL. The goal for her A1c is to be below 7%, ideally below 6.5%. he will continue her current medication regimen: Jardiance 25 mg daily, metformin 1000 mg once daily, and Ozempic 1 mg weekly. Refills for Jardiance and Ozempic have been provided.    Follow-up  The patient will follow up in 4 months.    Orders:  •  Hemoglobin A1C; Future    Type 2 diabetes mellitus with other circulatory complication, without long-term current use of insulin (HCC)    Lab Results   Component Value Date    HGBA1C 7.3 (H) 2025       Orders:  •  semaglutide, 1 mg/dose, (Ozempic, 1 MG/DOSE,) 4 mg/3 mL injection pen; Inject 0.75 mL (1 mg total) under the skin every 7 days    Primary hypertension  blood pressure is well-controlled at 112/70 mmHg. he will continue taking irbesartan 75 mg daily.       Mixed hyperlipidemia  Continue Crestor 40 mg daily.       Microalbuminuria  Proteinuria has decreased from 64 to 49.4 mg/24 hours, indicating improvement. This is likely due to her current medications, we will continue Jardiance and a Irbesartan.             History of Present Illness   History of Present Illness    Fidel Calderon is a 59 y.o. male with type 2 diabetes  seen in follow up.       he has been adhering to  prescribed regimen without any missed doses. However, he acknowledges a period of inactivity following his surgery, during which he did not engage in any physical activity. His dietary habits were also suboptimal during this time, characterized by the consumption of unhealthy foods such as chips. he monitors his blood glucose levels daily, although there were instances in January and February where he was unable to do so. he underwent an ophthalmological examination last month, which yielded normal results. he conducts regular foot checks as part of his self-care routine.     His current medication regimen includes Jardiance 25 mg daily, metformin 1000 mg once daily, and Ozempic 1 mg weekly..               Last Eye Exam: Not on file  Last Foot Exam: 03/09/2023  Health Maintenance   Topic Date Due   • Diabetic Eye Exam  Never done   • Diabetic Foot Exam  04/01/2026     Pertinent Medical History           Review of Systems as per [facility]    Medical History Reviewed by provider this encounter:     .  Current Outpatient Medications on File Prior to Visit   Medication Sig Dispense Refill   • acetaminophen (TYLENOL) 500 mg tablet Take 2 tablets (1,000 mg total) by mouth every 8 (eight) hours as needed for mild pain or moderate pain 60 tablet 0   • amLODIPine (NORVASC) 5 mg tablet Take 1 tablet (5 mg total) by mouth daily 90 tablet 3   • aspirin (ECOTRIN LOW STRENGTH) 81 mg EC tablet Take 1 tablet by mouth daily     • Cholecalciferol (VITAMIN D) 2000 units CAPS Take by mouth     • Empagliflozin (Jardiance) 25 MG TABS Take 1 tablet (25 mg total) by mouth every morning 90 tablet 1   • ibuprofen (MOTRIN) 800 mg tablet Take 1 tablet (800 mg total) by mouth every 8 (eight) hours as needed for mild pain or moderate pain 30 tablet 0   • irbesartan (AVAPRO) 75 mg tablet Take 1 tablet (75 mg total) by mouth daily 90 tablet 1   • metoprolol succinate (TOPROL-XL) 25 mg 24 hr tablet TAKE 1  TABLET DAILY 90 tablet 1   • nitroglycerin (NITROSTAT) 0.4 mg SL tablet Place 1 tablet (0.4 mg total) under the tongue every 5 (five) minutes as needed for chest pain 90 tablet 3   • NON FORMULARY Cinn supp     • rosuvastatin (CRESTOR) 40 MG tablet TAKE 1 TABLET DAILY 90 tablet 1   • tadalafil (CIALIS) 10 MG tablet Take 10 mg by mouth as needed     • ticagrelor (BRILINTA) 90 MG Take 1 tablet (90 mg total) by mouth 2 (two) times a day 180 tablet 0   • zolpidem (AMBIEN) 10 mg tablet one tab at bedtime as needed     • [DISCONTINUED] metFORMIN (GLUCOPHAGE) 1000 MG tablet Take 1,000 mg by mouth daily with breakfast     • [DISCONTINUED] semaglutide, 1 mg/dose, (Ozempic, 1 MG/DOSE,) 4 mg/3 mL injection pen Inject 0.75 mL (1 mg total) under the skin every 7 days 9 mL 1     No current facility-administered medications on file prior to visit.         Medical History Reviewed by provider this encounter:     .    Objective   /70 (BP Location: Left arm, Patient Position: Sitting, Cuff Size: Adult)   Pulse 78   Temp 97.8 °F (36.6 °C) (Temporal)   Resp 18   Ht 6' (1.829 m)   Wt 93.5 kg (206 lb 3.2 oz)   SpO2 99%   BMI 27.97 kg/m²      Body mass index is 27.97 kg/m².  Wt Readings from Last 3 Encounters:   04/01/25 93.5 kg (206 lb 3.2 oz)   02/25/25 92.6 kg (204 lb 3.2 oz)   02/25/25 93 kg (205 lb)     Physical Exam  Feet were examined.    Vital Signs  Blood pressure is 112/70.  Physical Exam  Vitals and nursing note reviewed.   Constitutional:       General: He is not in acute distress.     Appearance: He is well-developed.   HENT:      Head: Normocephalic and atraumatic.   Eyes:      Conjunctiva/sclera: Conjunctivae normal.   Cardiovascular:      Rate and Rhythm: Normal rate and regular rhythm.      Pulses: no weak pulses.           Dorsalis pedis pulses are 2+ on the right side and 2+ on the left side.        Posterior tibial pulses are 2+ on the right side and 2+ on the left side.      Heart sounds: No murmur  heard.  Pulmonary:      Effort: Pulmonary effort is normal. No respiratory distress.      Breath sounds: Normal breath sounds.   Musculoskeletal:         General: No swelling.      Cervical back: Neck supple.   Feet:      Right foot:      Skin integrity: No ulcer, skin breakdown, erythema, warmth, callus or dry skin.      Left foot:      Skin integrity: No ulcer, skin breakdown, erythema, warmth, callus or dry skin.   Skin:     General: Skin is warm and dry.   Neurological:      Mental Status: He is alert.   Psychiatric:         Mood and Affect: Mood normal.     Patient's shoes and socks removed.    Right Foot/Ankle   Right Foot Inspection  Skin Exam: skin normal. Skin not intact, no dry skin, no warmth, no callus, no erythema, no maceration, no abnormal color, no pre-ulcer, no ulcer and no callus.     Toe Exam: No swelling, no tenderness, erythema and  no right toe deformity    Sensory   Vibration: intact  Proprioception: intact  Monofilament testing: intact    Vascular  Capillary refills: < 3 seconds  The right DP pulse is 2+. The right PT pulse is 2+.     Left Foot/Ankle  Left Foot Inspection  Skin Exam: skin normal. Skin not intact, no dry skin, no warmth, no erythema, no maceration, normal color, no pre-ulcer, no ulcer and no callus.     Toe Exam: No swelling, no tenderness, no erythema and no left toe deformity.     Sensory   Vibration: intact  Proprioception: intact  Monofilament testing: intact    Vascular  Capillary refills: < 3 seconds  The left DP pulse is 2+. The left PT pulse is 2+.     Assign Risk Category  No deformity present  No loss of protective sensation  No weak pulses  Risk: 0    Results  Laboratory Studies  A1c was 6.7% in November and has increased to 7.3%. Protein in urine was 49.4, down from 64.  Labs:   Lab Results   Component Value Date    HGBA1C 7.3 (H) 03/26/2025    HGBA1C 6.7 (H) 11/15/2024    HGBA1C 6.6 (H) 11/06/2024     Lab Results   Component Value Date    CREATININE 0.89  03/26/2025    CREATININE 0.90 11/15/2024    CREATININE 0.91 11/06/2024    BUN 23 03/26/2025     05/26/2018    K 4.0 03/26/2025     03/26/2025    CO2 24 03/26/2025     eGFRcr   Date Value Ref Range Status   11/06/2024 97 >59 Final     eGFR   Date Value Ref Range Status   03/26/2025 98 >59 Final   11/15/2024 93 ml/min/1.73sq m Final     Lab Results   Component Value Date    CHOL 141 05/26/2018    HDL 26 03/26/2025    TRIG 177 (H) 03/26/2025     Lab Results   Component Value Date    ALT 15 03/26/2025    AST 19 03/26/2025    ALKPHOS 67 03/26/2025    BILITOT 0.7 05/26/2018     Lab Results   Component Value Date    NFX0ECNVHFKA 1.260 04/02/2021    RXO4PJPDPUPK 1.237 04/06/2019    YKK0GGCWJJRR 0.474 11/30/2015       There are no Patient Instructions on file for this visit.    Discussed with the patient and all questioned fully answered. He will call me if any problems arise.

## 2025-04-01 NOTE — ASSESSMENT & PLAN NOTE
Lab Results   Component Value Date    HGBA1C 7.3 (H) 03/26/2025       Orders:  •  semaglutide, 1 mg/dose, (Ozempic, 1 MG/DOSE,) 4 mg/3 mL injection pen; Inject 0.75 mL (1 mg total) under the skin every 7 days

## 2025-04-01 NOTE — ASSESSMENT & PLAN NOTE
blood pressure is well-controlled at 112/70 mmHg. he will continue taking irbesartan 75 mg daily.

## 2025-04-01 NOTE — PROGRESS NOTES
Daily Note     Today's date: 2025  Patient name: Fidel Calderon  : 1965  MRN: [id number]  Referring provider: Papo Castillo,*  Dx:   Encounter Diagnosis     ICD-10-CM    1. Primary osteoarthritis of first carpometacarpal joint of right hand  M18.11       2. Cubital tunnel syndrome on right  G56.21                      Subjective: pt notes more swelling from increased activity over the weekend      Objective: See treatment diary below    AROM right elbow E/F- 0/140; FA S/P- 70/70; wrist E/F- 65/45                      Thumb MP- 0/35; IP- 0/45;  -6.0 cm base of 5th                      IF- MP- 0/85; PIP- 0/85; DIP- 0/55   Circumference at wrist- 20.5  cm; Mps- 22.0 cm; thumb P1- 8.2 cm; MF P1- 7.1 cm  Sensation- pt notes no tingling in digits, occasional sharp shooting pain in thumb   Strength-  II- R/L- 45/110; 3 ALISSA- ; Key- 3/20     Assessment: Tolerated treatment well. Patient would benefit from continued PT      Plan: Continue per plan of care.      Precautions: activity as tolerated    Manuals 3/18 3/20 3/25  3/27  4/1             STM 15 15 15 15 15 15  15  15  15       F                       1-R                         2/5 2/5 2/5 2/5 2/5  2/5  2/5  2/        Tan                                                                      Neuro Re-Ed                       HP/pulsed biph 15 15 15 15 15 15  15  15  15                                                     Ther Ex                       TP  T 2' T 2' T/Y 2'  T/Y 2'  T/Y 2'  T 2'  T 2'   T 2'     TP 5 finger T 2/10 T 2/10 T/Y 2/10  T/Y 2/10  T/Y 2/10  T 2/10  T 2'   T 2/10     Thumbciser E/F  100% 2/10  100% 2/10  100% 2/10  100% 2/10 100% 2/10 100% 2/10  100% 2/10  0% /10  50% 2/10      Flexbar  G 20/20  G 20/20  G 20/20 G 20/20  G/B 2/10  G/B 2/10  G 20/20          David  25 2/10  25 2/10  25 2/10  25 2/10  35 2/10  35 2/10  25 2/10         Blue IV 2/10 IV 2/10 IV 2/10  IV 2/10  V 2/10  V IV 2/10         DB E/F 4 2/10 4 2/10 4  2/10  4 2/10  5 2/10  5 4 2/10          digiflex opp  R 2/10  R 2/10  R 2/10  R 2/10  R 2/10    R 2/10                                                                                                         Modalities                        US prn          15

## 2025-04-01 NOTE — ASSESSMENT & PLAN NOTE
Proteinuria has decreased from 64 to 49.4 mg/24 hours, indicating improvement. This is likely due to her current medications, we will continue Jardiance and a Irbesartan.

## 2025-04-02 ENCOUNTER — TELEPHONE (OUTPATIENT)
Dept: ADMINISTRATIVE | Facility: OTHER | Age: 60
End: 2025-04-02

## 2025-04-02 DIAGNOSIS — I10 HYPERTENSION, UNSPECIFIED TYPE: ICD-10-CM

## 2025-04-02 RX ORDER — AMLODIPINE BESYLATE 5 MG/1
5 TABLET ORAL DAILY
Qty: 90 TABLET | Refills: 1 | Status: SHIPPED | OUTPATIENT
Start: 2025-04-02

## 2025-04-02 NOTE — LETTER
Diabetic Eye Exam Form    Date Requested: 25  Patient: Fidel Calderon  Patient : 1965   Referring Provider: Jani Fernandez MD      DIABETIC Eye Exam Date _______________________________      Type of Exam MUST be documented for Diabetic Eye Exams. Please CHECK ONE.     Retinal Exam       Dilated Retinal Exam       OCT       Optomap-Iris Exam      Fundus Photography       Left Eye - Please check Retinopathy or No Retinopathy        Exam did show retinopathy    Exam did not show retinopathy       Right Eye - Please check Retinopathy or No Retinopathy       Exam did show retinopathy    Exam did not show retinopathy       Comments __________________________________________________________    Practice Providing Exam ______________________________________________    Exam Performed By (print name) _______________________________________      Provider Signature ___________________________________________________      These reports are needed for  compliance.    Please fax this completed form and a copy of the Diabetic Eye Exam report to the [facility] Based Department as soon as possible via Fax [phone], attention Peggy: Phone [phone]. Our office is located at [address] [place] [address].     We thank you for your assistance in treating our mutual patient.

## 2025-04-02 NOTE — TELEPHONE ENCOUNTER
----- Message from Maura PINEDO sent at 4/1/2025 11:14 AM EDT -----  04/01/25 11:14 AM    Hello, our patient Fidel Cadleron has had Diabetic Eye Exam completed/performed. Please assist in updating the patient chart by making an External outreach to [facility] located in [facility]. The date of service is march 2025.    Thank you,  Maura Valderrama MA  [facility] [place]

## 2025-04-03 ENCOUNTER — OFFICE VISIT (OUTPATIENT)
Facility: CLINIC | Age: 60
End: 2025-04-03
Payer: OTHER MISCELLANEOUS

## 2025-04-03 DIAGNOSIS — G56.21 CUBITAL TUNNEL SYNDROME ON RIGHT: ICD-10-CM

## 2025-04-03 DIAGNOSIS — M18.11 PRIMARY OSTEOARTHRITIS OF FIRST CARPOMETACARPAL JOINT OF RIGHT HAND: Primary | ICD-10-CM

## 2025-04-03 PROCEDURE — 97112 NEUROMUSCULAR REEDUCATION: CPT | Performed by: PHYSICAL THERAPIST

## 2025-04-03 PROCEDURE — 97110 THERAPEUTIC EXERCISES: CPT | Performed by: PHYSICAL THERAPIST

## 2025-04-03 PROCEDURE — 97140 MANUAL THERAPY 1/> REGIONS: CPT | Performed by: PHYSICAL THERAPIST

## 2025-04-03 NOTE — PROGRESS NOTES
Daily Note     Today's date: 4/3/2025  Patient name: Fidel Calderon  : 1965  MRN: [id number]  Referring provider: Papo Castillo,*  Dx:   Encounter Diagnosis     ICD-10-CM    1. Primary osteoarthritis of first carpometacarpal joint of right hand  M18.11       2. Cubital tunnel syndrome on right  G56.21                      Subjective: pt using his hand more and more- mild soreness at thumb CMC    Objective: See treatment diary below    AROM right elbow E/F- 0/140; FA S/P- 70/70; wrist E/F- 65/45                      Thumb MP- 0/35; IP- 0/45;  -6.0 cm base of 5th                      IF- MP- 0/85; PIP- 0/85; DIP- 0/55   Circumference at wrist- 20.5  cm; Mps- 22.0 cm; thumb P1- 8.2 cm; MF P1- 7.1 cm  Sensation- pt notes no tingling in digits, occasional sharp shooting pain in thumb   Strength-  II- R/L- 45/110; 3 ALISSA- ; Key- 3/20     Assessment: Tolerated treatment well. Patient would benefit from continued PT      Plan: Continue per plan of care.      Precautions: activity as tolerated    Manuals 3/18 3/20 3/25  3/27  4/1  4/3           STM 15 15 15 15 15 15  15  15  15       F                       1-R                         2/5 2/5 25 2/5 2/5  2/5  2/5  2/        Tan                                                                      Neuro Re-Ed                       HP/pulsed biph 15 15 15 15 15 15  15  15  15                                                     Ther Ex                       TP  T 2' T 2' T/Y 2'  T/Y 2'  T/Y 2'  T/Y 2'  T 2'   T 2'     TP 5 finger T 2/10 T 2/10 T/Y 2/10  T/Y 2/10  T/Y 2/10  T/Y 2/10  T 2'   T 2/10     Thumbciser E/F  100% 2/10  100% 2/10  100% 2/10  100% 2/10 100% 2/10 100% 2/10  100% /10  0% /10  50% /10      Flexbar  G 20/20  G 20/20  G 20/20 G 20/20  G/B 2/10  G/B 2/10  G 20/20          David  25 2/10  25 2/10  25 2/10  25 2/10  35 2/10  35 2/10  25 2/10         Blue IV 2/10 IV 2/10 IV 2/10  IV 2/10  V 2/10  V 2/10 IV 2/10         DB E/F 4 2/10 4  2/10 4 2/10  4 2/10  5 2/10  5 2/10 4 2/10          digiflex opp  R 2/10  R 2/10  R 2/10  R 2/10  R 2/10  R 2/10  R 2/10                                                                                                         Modalities                        US prn          15

## 2025-04-04 NOTE — TELEPHONE ENCOUNTER
Upon review of the In Basket request we have found as a result of outreach that the patient did not have the requested item(s) completed or the patient was not seen by the practice.     Patient declined diabetic eye exam    Any additional questions or concerns should be emailed to the Practice Liaisons via the appropriate education email address, please do not reply via In Basket.    Thank you  Peggy Calzada   [facility]

## 2025-04-07 NOTE — PROGRESS NOTES
Daily Note     Today's date: 2025  Patient name: Fidel Calderon  : 1965  MRN: [id number]  Referring provider: Papo Castillo,*  Dx:   Encounter Diagnosis     ICD-10-CM    1. Primary osteoarthritis of first carpometacarpal joint of right hand  M18.11       2. Cubital tunnel syndrome on right  G56.21                      Subjective: soreness and swelling persists in the thumb and IF base      Objective: See treatment diary below    AROM right elbow E/F- 0/140; FA S/P- 70/70; wrist E/F- 65/45                      Thumb MP- 0/35; IP- 0/45;  -6.0 cm base of 5th                      IF- MP- 0/85; PIP- 0/85; DIP- 0/55   Circumference at wrist- 20.5  cm; Mps- 22.0 cm; thumb P1- 8.2 cm; MF P1- 7.1 cm  Sensation- pt notes no tingling in digits, occasional sharp shooting pain in thumb   Strength-  II- R/L- 45/110; 3 ALISSA- ; Key- 3/20    Assessment: Tolerated treatment well. Patient would benefit from continued PT      Plan: Continue per plan of care.      Precautions: activity as tolerated    Manuals 3/18 3/20 3/25  3/27  4/1  4/3  4/8         STM 15 15 15 15 15 15  15  15  15       F                       1-R                         2/5 2/ 2 2/5 2/5  2/5  2/5  2/        Tan                                                                      Neuro Re-Ed                       HP/pulsed biph 15 15 15 15 15 15  15  15  15                                                     Ther Ex                       TP  T 2' T 2' T/Y 2'  T/Y 2'  T/Y 2'  T/Y 2'  T/Y 2'   T 2'     TP 5 finger T 2/10 T 2/10 T/Y 2/10  T/Y 2/10  T/Y 2/10  T/Y 2/10  T/Y 2'   T 2/10     Thumbciser E/F  100% 2/10  100% 2/10  100% 2/10  100% 2/10 100% 2/10 100% 2/10  100% 2/10  0% /10  50% /10      Flexbar  G 20/20  G 20/20  G 20/20 G 20/20  G/B 2/10  G/B 2/10  G/B 20/20          David  25 2/10  25 2/10  25 2/10  25 2/10  35 2/10  35 2/10  35 2/10         Blue IV 2/10 IV 2/10 IV 2/10  IV 2/10  V 2/10  V 2/10 V 2/10         DB E/F 4  2/10 4 2/10 4 2/10  4 2/10  5 2/10  5 2/10 5 2/10          digiflex opp  R 2/10  R 2/10  R 2/10  R 2/10  R 2/10  R 2/10  R 2/10                                                                                                         Modalities                        US prn          15

## 2025-04-08 ENCOUNTER — OFFICE VISIT (OUTPATIENT)
Facility: CLINIC | Age: 60
End: 2025-04-08
Payer: OTHER MISCELLANEOUS

## 2025-04-08 DIAGNOSIS — M18.11 PRIMARY OSTEOARTHRITIS OF FIRST CARPOMETACARPAL JOINT OF RIGHT HAND: Primary | ICD-10-CM

## 2025-04-08 DIAGNOSIS — G56.21 CUBITAL TUNNEL SYNDROME ON RIGHT: ICD-10-CM

## 2025-04-08 PROCEDURE — 97112 NEUROMUSCULAR REEDUCATION: CPT | Performed by: PHYSICAL THERAPIST

## 2025-04-08 PROCEDURE — 97140 MANUAL THERAPY 1/> REGIONS: CPT | Performed by: PHYSICAL THERAPIST

## 2025-04-08 PROCEDURE — 97110 THERAPEUTIC EXERCISES: CPT | Performed by: PHYSICAL THERAPIST

## 2025-04-09 ENCOUNTER — APPOINTMENT (OUTPATIENT)
Dept: RADIOLOGY | Facility: MEDICAL CENTER | Age: 60
End: 2025-04-09
Payer: COMMERCIAL

## 2025-04-09 ENCOUNTER — OFFICE VISIT (OUTPATIENT)
Dept: OBGYN CLINIC | Facility: MEDICAL CENTER | Age: 60
End: 2025-04-09

## 2025-04-09 VITALS — HEIGHT: 72 IN | WEIGHT: 205 LBS | BODY MASS INDEX: 27.77 KG/M2

## 2025-04-09 DIAGNOSIS — Z47.89 AFTERCARE FOLLOWING SURGERY OF THE MUSCULOSKELETAL SYSTEM: Primary | ICD-10-CM

## 2025-04-09 DIAGNOSIS — Z47.89 AFTERCARE FOLLOWING SURGERY OF THE MUSCULOSKELETAL SYSTEM: ICD-10-CM

## 2025-04-09 PROCEDURE — 73130 X-RAY EXAM OF HAND: CPT

## 2025-04-09 PROCEDURE — 99024 POSTOP FOLLOW-UP VISIT: CPT | Performed by: ORTHOPAEDIC SURGERY

## 2025-04-09 NOTE — PROGRESS NOTES
HAND & UPPER EXTREMITY OFFICE VISIT   Referred By:  No referring provider defined for this encounter.      Chief Complaint:     Right hand pain    Surgery:  Surgery Date: 1/13/2025 - Right thumb carpometacarpal joint arthroplasty with TightRope suspension - Right, RELEASE CARPAL TUNNEL - Right - Right, and RELEASE CUBITAL TUNNEL - Right - Right     10/21/24 - Release Carpal Tunnel - Left - Left and Release Cubital Tunnel - Left, With Submuscular Transposition - Left     History of Present Illness:   Patient presents now 3 months status post the right thumb CMC arthroplasty with carpal and cubital tunnel release. He is also 5 months s/p left carpal and cubital tunnel release. he reports that his swelling has significantly improved since the last visit. He does still have mild swelling in the hand which fluctuates in severity. He has been wearing a compression glove, massaging the area, and working with PT for his ROM, strengthening, and edema control. He has been curling 4-5 lbs in the right hand. He reports some occasional tingling in the thumb when he has increased swelling in the hand.     For the left hand, he reports intermittent pain and locking in the base of the left thumb. His symptoms are well-controlled at this point and would like to continue with observation for now while he recovers from the right side.     Past Medical History:  Past Medical History:   Diagnosis Date    Cardiac disease     Diabetes mellitus (HCC)     Hyperlipidemia     Hypertension     MI (myocardial infarction) (HCC)     Myocardial infarction (HCC)     Sleep apnea      Past Surgical History:   Procedure Laterality Date    CARDIAC CATHETERIZATION N/A 03/06/2023    Procedure: Cardiac catheterization;  Surgeon: Fernando Panda MD;  Location: [facility];  Service: Cardiology    CARDIAC CATHETERIZATION N/A 03/06/2023    Procedure: Cardiac pci;  Surgeon: Fernando Panda MD;  Location: [facility];  Service: Cardiology     CARDIAC CATHETERIZATION N/A 03/06/2023    Procedure: Cardiac Coronary Angiogram;  Surgeon: Fernando Panda MD;  Location: [facility];  Service: Cardiology    CARDIAC CATHETERIZATION Left 03/18/2024    Procedure: Cardiac catheterization;  Surgeon: Bbo Fraser MD;  Location: [facility];  Service: Cardiology    CARDIAC CATHETERIZATION N/A 03/18/2024    Procedure: Cardiac pci;  Surgeon: Bob Fraser MD;  Location: [facility];  Service: Cardiology    CARDIAC SURGERY      COLONOSCOPY      CORONARY ANGIOPLASTY WITH STENT PLACEMENT      DEBRIDEMENT TENNIS ELBOW      HERNIA REPAIR      KNEE ARTHROSCOPY      GA ARTHRP INTERCARPAL/CARP/MTCRPL JT INTERPOSITION Right 1/13/2025    Procedure: Right thumb carpometacarpal joint arthroplasty with TightRope suspension;  Surgeon: Papo Castillo MD;  Location: [facility];  Service: Orthopedics    GA NEUROPLASTY &/TRANSPOS MEDIAN NRV CARPAL TUNNE Left 10/21/2024    Procedure: RELEASE CARPAL TUNNEL - LEFT;  Surgeon: Papo Castillo MD;  Location: [facility];  Service: Orthopedics    GA NEUROPLASTY &/TRANSPOS MEDIAN NRV CARPAL TUNNE Right 1/13/2025    Procedure: RELEASE CARPAL TUNNEL - Right;  Surgeon: Papo Castillo MD;  Location: [facility];  Service: Orthopedics    GA NEUROPLASTY &/TRANSPOSITION ULNAR NERVE ELBOW Left 10/21/2024    Procedure: RELEASE CUBITAL TUNNEL - LEFT, WITH SUBMUSCULAR TRANSPOSITION;  Surgeon: Papo Castillo MD;  Location[facility];  Service: Orthopedics    GA NEUROPLASTY &/TRANSPOSITION ULNAR NERVE ELBOW Right 1/13/2025    Procedure: RELEASE CUBITAL TUNNEL - Right;  Surgeon: Papo Castillo MD;  Location: [facility];  Service: Orthopedics    SHOULDER ARTHROSCOPY      VASECTOMY       Family History   Problem Relation Age of Onset    Hypertension Maternal Grandfather     Diabetes type I Maternal Grandmother     Hypertension Paternal Grandfather     Diabetes type II Paternal Grandmother   "    Social History     Socioeconomic History    Marital status: /Civil Union     Spouse name: Not on file    Number of children: Not on file    Years of education: Not on file    Highest education level: Not on file   Occupational History    Not on file   Tobacco Use    Smoking status: Former     Current packs/day: 0.00     Average packs/day: 0.5 packs/day for 28.0 years (14.0 ttl pk-yrs)     Types: Cigarettes     Start date: 1977     Quit date: 2005     Years since quittin.2    Smokeless tobacco: Former     Types: Chew     Quit date: 1985   Vaping Use    Vaping status: Never Used   Substance and Sexual Activity    Alcohol use: Yes     Alcohol/week: 2.0 standard drinks of alcohol     Types: 2 Shots of liquor per week     Comment: 2 drinks weekly    Drug use: No    Sexual activity: Yes   Other Topics Concern    Not on file   Social History Narrative    Not on file     Social Drivers of Health     Financial Resource Strain: Not on file   Food Insecurity: Not on file   Transportation Needs: Not on file   Physical Activity: Not on file   Stress: Not on file   Social Connections: Not on file   Intimate Partner Violence: Not on file   Housing Stability: Not on file     Scheduled Meds:  Continuous Infusions:No current facility-administered medications for this visit.    PRN Meds:.  Allergies   Allergen Reactions    Banana - Food Allergy Throat Swelling    Tetanus Toxoid Other (See Comments)       Physical Examination:    Ht 6' (1.829 m)   Wt 93 kg (205 lb)   BMI 27.80 kg/m²     Gen: A&Ox3, NAD    Right Upper Extremity:  Incisions well-healed without signs of infection   Thumb held in Abducted position  Improved swelling throughout thumb and radial hand  Sensation intact to light touch in the axillary median, ulnar, and radial nerve distributions  Able to form loose fist and oppose thumb to tip of small finger. Unable to oppose to base of small finger due to discomfort and \"tight\" sensation  Warm, " well-perfused digits  Cap refill <2s      Left Upper Extremity:   Incision well-healed without signs of infection.  Non-tender around incision sites  Sensation intact to light touch in the axillary median, ulnar, and radial nerve distributions  Full digital and elbow ROM  Warm, well-perfused digits  Cap refill <2s      Studies:  Radiographs: I personally reviewed and independently interpreted the available radiographs.  4/9/25: Radiographs of the right hand, multiple views, demonstrate stable surgical changes s/p thumb CMC arthroplasty. No evidence of hardware failure or subsidence.     Assessment & Plan  Aftercare following surgery of the musculoskeletal system  59 y.o. male presents 3 months status post Right thumb carpometacarpal joint arthroplasty with TightRope suspension - Right, RELEASE CARPAL TUNNEL - Right - Right, and RELEASE CUBITAL TUNNEL - Right - Right.     Overall he has been progressing well since his last visit. His swelling has significantly improved and he has began to work on strengthening the right hand. At this point he may continue to advance his strengthening as tolerated without formal restrictions. He is not ready to return to work due to his heavy lifting requirements. It is recommended he return to the office in 4 weeks, for repeat evaluation. Work note provided.     Orders:    XR hand 3+ vw right; Future        he expressed understanding of the plan and agreed. We encouraged them to contact our office with any questions or concerns.         Papo Castillo MD  Hand and Upper Extremity Surgery          *This note was dictated using Dragon voice recognition software. Please excuse any word substitutions or errors.*      Scribe Attestation      I,:  Genevieve Hoover PA-C am acting as a scribe while in the presence of the attending physician.:       I,:  Papo Castillo MD personally performed the services described in this documentation    as scribed in my presence.:              scribed in my presence.:

## 2025-04-09 NOTE — PROGRESS NOTES
Daily Note     Today's date: 4/10/2025  Patient name: Fidel Calderon  : 1965  MRN: [id number]  Referring provider: Papo Castillo,*  Dx:   Encounter Diagnosis     ICD-10-CM    1. Primary osteoarthritis of first carpometacarpal joint of right hand  M18.11       2. Cubital tunnel syndrome on right  G56.21                      Subjective: pt was seen by his physician and will continue therapy for an additional month.        Objective: See treatment diary below    AROM right elbow E/F- 0/140; FA S/P- 70/70; wrist E/F- 65/45                      Thumb MP- 0/35; IP- 0/45;  -6.0 cm base of 5th                      IF- MP- 0/85; PIP- 0/85; DIP- 0/55   Circumference at wrist- 20.5  cm; Mps- 22.0 cm; thumb P1- 8.2 cm; MF P1- 7.1 cm  Sensation- pt notes no tingling in digits, occasional sharp shooting pain in thumb   Strength-  II- R/L- 45/110; 3 ALISSA- ; Key- 3/20    Assessment: Tolerated treatment well. Patient would benefit from continued PT      Plan: Continue per plan of care.      Precautions: activity as tolerated    Manuals 3/18 3/20 3/25  3/27  4/1  4/3  4/8  4/10       STM 15 15 15 15 15 15  15  15  15       F                       1-R                         25 2/ 2/5 2/5 2/5  2/5  2/5  2/        Tan                                                                      Neuro Re-Ed                       HP/pulsed biph 15 15 15 15 15 15  15  15  15                                                     Ther Ex                       TP  T 2' T 2' T/Y 2'  T/Y 2'  T/Y 2'  T/Y 2'  T/Y 2'  T/Y 2' T 2'     TP 5 finger T 2/10 T 2/10 T/Y 2/10  T/Y 2/10  T/Y 2/10  T/Y 2/10  T/Y 2'  T/Y 2' T 2/10     Thumbciser E/F  100% 2/10  100% 2/10  100% 2/10  100% 2/10 100% 2/10 100% 2/10  100% 2/10  100% 2/10  50% 2/10      Flexbar  G 20/20  G 20/20  G 20/20 G 20/20  G/B 10  G/B 10  G/B 20/20  G/B 20/20        David  25 2/10  25 2/10  25 2/10  25 2/10  35 2/10  35 2/10  35 2/10  35 2/10       Blue IV 2/10 IV  2/10 IV 2/10  IV 2/10  V 2/10  V 2/10 V 2/10  V 2/10       DB E/F 4 2/10 4 2/10 4 2/10  4 2/10  5 2/10  5 2/10 5 2/10  5 2/10        digiflex opp  R 2/10  R 2/10  R 2/10  R 2/10  R 2/10  R 2/10  R 2/10  R 2/10        Nibs                  T                                                                             Modalities                        US prn          15

## 2025-04-09 NOTE — LETTER
April 9, 2025     Patient: Fidel Calderon  YOB: 1965  Date of Visit: 4/9/2025      To Whom it May Concern:    Fidel Calderon is under my professional care. Fidel CALVIN was seen in my office on 4/9/2025. Fidel CALVIN should remain out of work until his next evaluation in 4 weeks.    If you have any questions or concerns, please don't hesitate to call.         Sincerely,          Papo Castillo MD        CC: No Recipients

## 2025-04-10 ENCOUNTER — OFFICE VISIT (OUTPATIENT)
Facility: CLINIC | Age: 60
End: 2025-04-10
Payer: OTHER MISCELLANEOUS

## 2025-04-10 DIAGNOSIS — M18.11 PRIMARY OSTEOARTHRITIS OF FIRST CARPOMETACARPAL JOINT OF RIGHT HAND: Primary | ICD-10-CM

## 2025-04-10 DIAGNOSIS — G56.21 CUBITAL TUNNEL SYNDROME ON RIGHT: ICD-10-CM

## 2025-04-10 PROCEDURE — 97112 NEUROMUSCULAR REEDUCATION: CPT | Performed by: PHYSICAL THERAPIST

## 2025-04-10 PROCEDURE — 97110 THERAPEUTIC EXERCISES: CPT | Performed by: PHYSICAL THERAPIST

## 2025-04-10 PROCEDURE — 97140 MANUAL THERAPY 1/> REGIONS: CPT | Performed by: PHYSICAL THERAPIST

## 2025-04-14 NOTE — PROGRESS NOTES
Daily Note     Today's date: 4/15/2025  Patient name: Fidel Calderon  : 1965  MRN: [id number]  Referring provider: Papo Castillo,*  Dx:   Encounter Diagnosis     ICD-10-CM    1. Primary osteoarthritis of first carpometacarpal joint of right hand  M18.11       2. Cubital tunnel syndrome on right  G56.21                      Subjective: stiffness noted.  Still difficult to twist and press with his thumb due to pain at the base.      Objective: See treatment diary below    AROM right elbow E/F- 0/140; FA S/P- 70/70; wrist E/F- 65/45                      Thumb MP- 0/35; IP- 0/45;  -6.0 cm base of 5th                      IF- MP- 0/85; PIP- 0/85; DIP- 0/55   Circumference at wrist- 20.5  cm; Mps- 22.0 cm; thumb P1- 8.2 cm; MF P1- 7.1 cm  Sensation- pt notes no tingling in digits, occasional sharp shooting pain in thumb   Strength-  II- R/L- 50/90; 3 ALISSA- ; Key-     Assessment: Tolerated treatment well. Patient would benefit from continued PT      Plan: Continue per plan of care.      Precautions: activity as tolerated    Manuals 4/15              STM 15 15 15 15 15 15  15  15  15                                                      2/5 2/5 2/5 2/5 2/5  2/5  2/5  2/                              Neuro Re-Ed                       HP/pulsed biph 15 15 15 15 15 15  15  15  15                                                     Ther Ex                       TP  O 2' T 2' T/Y 2'  T/Y 2'  T/Y 2'  T/Y 2'  T/Y 2'  T/Y 2' O 2'     TP 5 finger O 2/10 T 2/10 T/Y 2/10  T/Y 2/10  T/Y 2/10  T/Y 2/10  T/Y 2'  T/Y 2' O 2/10     Thumbciser E/F  100% 2/10  100% 2/10  100% 2/10  100% 2/10 100% 2/10 100% 2/10  100% 2/10  100% 2/10  100% 2/10      Flexbar  G/B 20/20  G 20/20  G 20/20 G 20/20  G/B 2/10  G/B 2/10  G/B 20/20  G/B 20/20  G/B 20/20      David  35 2/10  25 2/10  25 2/10  25 2/10  35 2/10  35 2/10  35 2/10  35 2/10  35 2/10     Blue V 2/10 IV 2/10 IV 2/10  IV 2/10  V 2/10  V 2/10 V 2/10  V 2/10  V 2/10      DB E/F 5 2/10 4 2/10 4 2/10  4 2/10  5 2/10  5 2/10 5 2/10  5 2/10  5 2/10      digiflex opp  R 2/10  R 2/10  R 2/10  R 2/10  R 2/10  R 2/10  R 2/10  R 2/10  R      Nibs  Y 12                T                                                                             Modalities

## 2025-04-15 ENCOUNTER — OFFICE VISIT (OUTPATIENT)
Facility: CLINIC | Age: 60
End: 2025-04-15
Payer: OTHER MISCELLANEOUS

## 2025-04-15 DIAGNOSIS — G56.21 CUBITAL TUNNEL SYNDROME ON RIGHT: ICD-10-CM

## 2025-04-15 DIAGNOSIS — M18.11 PRIMARY OSTEOARTHRITIS OF FIRST CARPOMETACARPAL JOINT OF RIGHT HAND: Primary | ICD-10-CM

## 2025-04-15 PROCEDURE — 97140 MANUAL THERAPY 1/> REGIONS: CPT | Performed by: PHYSICAL THERAPIST

## 2025-04-15 PROCEDURE — 97112 NEUROMUSCULAR REEDUCATION: CPT | Performed by: PHYSICAL THERAPIST

## 2025-04-15 PROCEDURE — 97110 THERAPEUTIC EXERCISES: CPT | Performed by: PHYSICAL THERAPIST

## 2025-04-15 NOTE — PROGRESS NOTES
Daily Note     Today's date: 2025  Patient name: Fidel Calderon  : 1965  MRN: [id number]  Referring provider: Papo Castillo,*  Dx:   Encounter Diagnosis     ICD-10-CM    1. Primary osteoarthritis of first carpometacarpal joint of right hand  M18.11       2. Cubital tunnel syndrome on right  G56.21                      Subjective: soreness from using much yesterday      Objective: See treatment diary below    AROM right elbow E/F- 0/140; FA S/P- 70/70; wrist E/F- 65/45                      Thumb MP- 0/35; IP- 0/45;  -6.0 cm base of 5th                      IF- MP- 0/85; PIP- 0/85; DIP- 0/55   Circumference at wrist- 20.5  cm; Mps- 22.0 cm; thumb P1- 8.2 cm; MF P1- 7.1 cm  Sensation- pt notes no tingling in digits, occasional sharp shooting pain in thumb   Strength-  II- R/L- 50/90; 3 ALISSA- ; Key-     Assessment: Tolerated treatment well. Patient would benefit from continued PT      Plan: Continue per plan of care.      Precautions: activity as tolerated    Manuals 4/15 4/17             STM 15 15 15 15 15 15  15  15  15                                                                                           Neuro Re-Ed                       HP/pulsed biph 15 15 15 15 15 15  15  15  15                                                     Ther Ex                       TP  O 2' O 2' O  2'  T/Y 2'  T/Y 2'  T/Y 2'  T/Y 2'  T/Y 2' O 2'     TP 5 finger O 2/10 O 2/10 O 2/10  T/Y 2/10  T/Y 2/10  T/Y 2/10  T/Y 2'  T/Y 2' O 2/10     Thumbciser E/F  100% 2/10 100% 2/10  100% 2/10  100% 2/10 100% 2/10 100% 2/10  100% 2/10  100% 2/10  100% 2/10      Flexbar  G/B 20/20 G/B 20/20  G/B 20/20 G 20/20  G/B 2/10  G/B 2/10  G/B 20/20  G/B 20/20  G/B 20/20      David  35 2/10  35 2/10  35 2/10  25 2/10  35 2/10  35 2/10  35 2/10  35 2/10  35 2/10     Blue V 2/10 IV 2/10 IV 2/10  IV 2/10  V 2/10  V 2/10 V 2/10  V 2/10  V 2/10     DB E/F 5 2/10 4 2/10 4 2/10  4 2/10  5 2/10  5 2/10 5 2/10  5 2/10  5 2/10       digiflex opp  R 2/10  R 2/10  R 2/10  R 2/10  R 2/10  R 2/10  R 2/10  R 2/10  R      Nibs  Y 12  Y 12 Y 12             T                                                                             Modalities

## 2025-04-17 ENCOUNTER — OFFICE VISIT (OUTPATIENT)
Facility: CLINIC | Age: 60
End: 2025-04-17
Attending: ORTHOPAEDIC SURGERY
Payer: OTHER MISCELLANEOUS

## 2025-04-17 DIAGNOSIS — G56.21 CUBITAL TUNNEL SYNDROME ON RIGHT: ICD-10-CM

## 2025-04-17 DIAGNOSIS — M18.11 PRIMARY OSTEOARTHRITIS OF FIRST CARPOMETACARPAL JOINT OF RIGHT HAND: Primary | ICD-10-CM

## 2025-04-17 PROCEDURE — 97112 NEUROMUSCULAR REEDUCATION: CPT | Performed by: PHYSICAL THERAPIST

## 2025-04-17 PROCEDURE — 97140 MANUAL THERAPY 1/> REGIONS: CPT | Performed by: PHYSICAL THERAPIST

## 2025-04-17 PROCEDURE — 97110 THERAPEUTIC EXERCISES: CPT | Performed by: PHYSICAL THERAPIST

## 2025-04-21 ENCOUNTER — OFFICE VISIT (OUTPATIENT)
Facility: CLINIC | Age: 60
End: 2025-04-21
Attending: ORTHOPAEDIC SURGERY
Payer: OTHER MISCELLANEOUS

## 2025-04-21 DIAGNOSIS — M18.11 PRIMARY OSTEOARTHRITIS OF FIRST CARPOMETACARPAL JOINT OF RIGHT HAND: Primary | ICD-10-CM

## 2025-04-21 DIAGNOSIS — G56.21 CUBITAL TUNNEL SYNDROME ON RIGHT: ICD-10-CM

## 2025-04-21 PROCEDURE — 97140 MANUAL THERAPY 1/> REGIONS: CPT | Performed by: PHYSICAL THERAPIST

## 2025-04-21 PROCEDURE — 97110 THERAPEUTIC EXERCISES: CPT | Performed by: PHYSICAL THERAPIST

## 2025-04-21 PROCEDURE — 97112 NEUROMUSCULAR REEDUCATION: CPT | Performed by: PHYSICAL THERAPIST

## 2025-04-21 NOTE — PROGRESS NOTES
PT Re-Evaluation     Today's date: 2025  Patient name: Fidel Calderon  : 1965  MRN: [id number]  Referring provider: Papo Castillo,*  Dx:   Encounter Diagnosis     ICD-10-CM    1. Primary osteoarthritis of first carpometacarpal joint of right hand  M18.11       2. Cubital tunnel syndrome on right  G56.21                      Assessment    Assessment details: Pt is a 60 YO male presenting to PT with pain, decreased AROM, strength and tolerance to activity.  Pt would benefit from skilled intervention to address these issues and maximize overall function.  Occupation- [employer]- currently off  Dominant- left ; Involved- right thumb,  CT, CuT     Pt progressing steadily with AROM of the thumb.  Mild pain persists with motion and activity.  Swelling decreasing as his activity level improves and he is more tolerant of loading with  and pinch.    Goals    ST.  Decrease pain to 0-2/10 in 4-8 weeks to ease ADL and self care            2.  Decrease swelling 0.5 cm in 4-8 weeks            3.  Increase AROM 10-20 degrees in 4 weeks for improved ability to bathe, dress, and assist in functional activity            4.  Provide orthotic for protection, compression for support            5.  Instruct in activity modification for ADL and self care with independence in 4-6 weeks            6.  Instruct in HEP   LT.  Increase functional AROM to assist with independence in 8-12 weeks            2.  [place] with HEP in 4-6 weeks            3.  Increase  strength by 2-5 lbs in 8 weeks            4. Recreational activities are improved to maximum level in 12 weeks.            5.  ADL performance is improved to maximal level of function in 12 weeks.            6. Ability to RTW by DC        Plan  Patient would benefit from: skilled physical therapy  Planned modality interventions: cryotherapy, thermotherapy: hydrocollator packs and ultrasound    Planned therapy interventions: activity  modification, manual therapy, strengthening, stretching, therapeutic activities, therapeutic exercise and home exercise program    Frequency: 2x week  Duration in weeks: 4  Treatment plan discussed with: patient        Subjective Evaluation    History of Present Illness  Date of surgery: 2025  Mechanism of injury: Pt with persistent pain in his right thumb, numbness and tingling in his right hand.  Dr. Castillo completed surgery for right thumb CMC tight rope suspension plasty, Carpal and cubital tunnel release omn the right  Pt with history of left CT and CuT release 10/24.    Patient Goals  Patient goals for therapy: decreased edema, increased motion, decreased pain, increased strength, independence with ADLs/IADLs, return to sport/leisure activities and return to work    Pain  Current pain ratin  At best pain ratin  At worst pain ratin  Quality: dull ache and sharp    Hand dominance: left    Treatments  Current treatment: physical therapy        Objective     General Comments:      Wrist/Hand Comments  AROM right elbow E/F- 0/140; FA S/P- 70/70; wrist E/F- 65/45                      Thumb MP- 0/35; IP- 0/45;  -6.0 cm base of 5th                      IF- MP- 0/85; PIP- 0/85; DIP- 0/55   Circumference at wrist- 20.5  cm; Mps- 22.0 cm; thumb P1- 8.2 cm; MF P1- 7.1 cm  Sensation- pt notes no tingling in digits, occasional sharp shooting pain in thumb   Strength-  II- R/L- 50/90; 3 ALISSA- ; Key-                Precautions: activity as tolerated    Manuals 4/15 4/17  4/21                 STM 15 15 15 15 15 15  15  15  15                                                                                                      Neuro Re-Ed                       HP/pulsed biph 15 15 15 15 15 15  15  15  15                                                     Ther Ex                       TP  O 2' O 2' O  2'  T/Y 2'  T/Y 2'  T/Y 2'  T/Y 2'  T/Y 2' O 2'     TP 5 finger O 2/10 O 10 O 2/10  T/Y 2/10   T/Y 2/10  T/Y 2/10  T/Y 2'  T/Y 2' O 2/10     Thumbciser E/F  100% 2/10 100% 2/10  100% 2/10  100% 2/10 100% 2/10 100% 2/10  100% 2/10  100% 2/10  100% 2/10      Flexbar  G/B 20/20 G/B 20/20  G/B 20/20 G 20/20  G/B 2/10  G/B 2/10  G/B 20/20  G/B 20/20  G/B 20/20      David  35 2/10  35 2/10  35 2/10  25 2/10  35 2/10  35 2/10  35 2/10  35 2/10  35 2/10     Blue V 2/10 V 2/10 V 2/10  IV 2/10  V 2/10  V 2/10 V 2/10  V 2/10  V 2/10     DB E/F 5 2/10 5 2/10 5 2/10  4 2/10  5 2/10  5 2/10 5 2/10  5 2/10  5 2/10           S/P 1.5 2/10 1.5 2/10 1.5 2/10           digiflex opp  R 2/10  R 2/10  R 2/10  R 2/10  R 2/10  R 2/10  R 2/10  R 2/10  R      Nibs  Y 12  Y 12 Y 12             T                                                                             Modalities

## 2025-04-21 NOTE — PROGRESS NOTES
Daily Note     Today's date: 2025  Patient name: Fidel Calderon  : 1965  MRN: [id number]  Referring provider: Papo Castillo,*  Dx: No diagnosis found.               Subjective: ***      Objective: See treatment diary below      Assessment: Tolerated treatment {Tolerated treatment :5073162396}. Patient {assessment:7014823466}      Plan: {PLAN:8588236893}     Precautions: activity as tolerated    Manuals 4/15 4/17 4/21            STM 15 15 15 15 15 15  15  15  15                                                                                           Neuro Re-Ed                       HP/pulsed biph 15 15 15 15 15 15  15  15  15                                                     Ther Ex                       TP  O 2' O 2' O  2'  T/Y 2'  T/Y 2'  T/Y 2'  T/Y 2'  T/Y 2' O 2'     TP 5 finger O 2/10 O 2/10 O 2/10  T/Y 2/10  T/Y 2/10  T/Y 2/10  T/Y 2'  T/Y 2' O 2/10     Thumbciser E/F  100% 2/10 100% 2/10  100% 2/10  100% 2/10 100% 2/10 100% 2/10  100% 2/10  100% 2/10  100% 2/10      Flexbar  G/B 20/20 G/B 20/20  G/B 20/20 G 20/20  G/B 2/10  G/B 2/10  G/B 20/20  G/B 20/20  G/B 20/20      David  35 2/10  35 2/10  35 2/10  25 2/10  35 2/10  35 2/10  35 2/10  35 2/10  35 2/10     Blue V 2/10 IV 2/10 IV 2/10  IV 2/10  V 2/10  V 2/10 V 2/10  V 2/10  V 2/10     DB E/F 5 2/10 5 2/10 5 2/10  4 2/10  5 2/10  5 2/10 5 2/10  5 2/10  5 2/10      digiflex opp  R 2/10  R 2/10  R 2/10  R 2/10  R 2/10  R 2/10  R 2/10  R 2/10  R      Nibs  Y 12  Y 12 Y 12             T                                                                             Modalities

## 2025-04-22 ENCOUNTER — APPOINTMENT (OUTPATIENT)
Facility: CLINIC | Age: 60
End: 2025-04-22
Payer: OTHER MISCELLANEOUS

## 2025-04-23 NOTE — PROGRESS NOTES
Daily Note     Today's date: 2025  Patient name: Fidel Calderon  : 1965  MRN: [id number]  Referring provider: Papo Castillo,*  Dx:   Encounter Diagnosis     ICD-10-CM    1. Primary osteoarthritis of first carpometacarpal joint of right hand  M18.11       2. Cubital tunnel syndrome on right  G56.21                      Subjective: Pt progressing steadily with increased AROM and functional strength      Objective: See treatment diary below    AROM right elbow E/F- 0/140; FA S/P- 70/70; wrist E/F- 65/45                      Thumb MP- 0/35; IP- 0/45;  -6.0 cm base of 5th                      IF- MP- 0/85; PIP- 0/85; DIP- 0/55   Circumference at wrist- 20.5  cm; Mps- 22.0 cm; thumb P1- 8.2 cm; MF P1- 7.1 cm  Sensation- pt notes no tingling in digits, occasional sharp shooting pain in thumb   Strength-  II- R/L- 50/90; 3 ALISSA- ; Key-         Assessment: Tolerated treatment well. Patient would benefit from continued PT      Plan: Continue per plan of care.      Precautions: activity as tolerated    Manuals 4/15 4/17  4/21  4/24               STM 15 15 15 15 15 15  15  15  15                                                                                                      Neuro Re-Ed                       HP/pulsed biph 15 15 15 15 15 15  15  15  15                                                     Ther Ex                       TP  O 2' O 2' O  2'  O 2'  T/Y 2'  T/Y 2'  T/Y 2'  T/Y 2' O 2'     TP 5 finger O 2/10 O 2/10 O 2/10  O 2/10  T/Y 2/10  T/Y 2/10  T/Y 2'  T/Y 2' O 2/10     Thumbciser E/F  100% 2/10 100% 2/10  100% 2/10  100% 2/10 100% 2/10 100% 2/10  100% 2/10  100% 2/10  100% 2/10      Flexbar  G/B 20/20 G/B 20/20  G/B 20/20 B 20/20  G/B 2/10  G/B 2/10  G/B 20/20  G/B 20/20  G/B 20/20      David  35 2/10  35 2/10  35 2/10  35 2/10  35 2/10  35 2/10  35 2/10  35 2/10  35 2/10     Blue V 2/10 V 2/10 V 2/10  V 2/10  V 2/10  V 2/10 V 2/10  V 2/10  V 2/10     DB E/F 5 2/10 5 2/10 5  2/10 5 2/10  5 2/10  5 2/10 5 2/10  5 2/10  5 2/10           S/P 1.5 2/10 1.5 2/10 1.5 2/10 1.5 2/10          digiflex opp  R 2/10  R 2/10  R 2/10  R 2/10  R 2/10  R 2/10  R 2/10  R 2/10  R      Nibs  Y 12  Y 12 Y 12   Y 12          T                                                                             Modalities

## 2025-04-24 ENCOUNTER — OFFICE VISIT (OUTPATIENT)
Facility: CLINIC | Age: 60
End: 2025-04-24
Payer: OTHER MISCELLANEOUS

## 2025-04-24 DIAGNOSIS — M18.11 PRIMARY OSTEOARTHRITIS OF FIRST CARPOMETACARPAL JOINT OF RIGHT HAND: Primary | ICD-10-CM

## 2025-04-24 DIAGNOSIS — G56.21 CUBITAL TUNNEL SYNDROME ON RIGHT: ICD-10-CM

## 2025-04-24 PROCEDURE — 97110 THERAPEUTIC EXERCISES: CPT | Performed by: PHYSICAL THERAPIST

## 2025-04-24 PROCEDURE — 97140 MANUAL THERAPY 1/> REGIONS: CPT | Performed by: PHYSICAL THERAPIST

## 2025-04-24 PROCEDURE — 97112 NEUROMUSCULAR REEDUCATION: CPT | Performed by: PHYSICAL THERAPIST

## 2025-04-25 NOTE — PROGRESS NOTES
Daily Note     Today's date: 2025  Patient name: Fidel Calderon  : 1965  MRN: [id number]  Referring provider: Papo Castillo,*  Dx:   Encounter Diagnosis     ICD-10-CM    1. Primary osteoarthritis of first carpometacarpal joint of right hand  M18.11       2. Cubital tunnel syndrome on right  G56.21                      Subjective: pt using his hands very much over the weekend and noting stiffness in the CMC limits his function      Objective: See treatment diary below    AROM right elbow E/F- 0/140; FA S/P- 70/70; wrist E/F- 65/45                      Thumb MP- 0/35; IP- 0/45;  -6.0 cm base of 5th                      IF- MP- 0/85; PIP- 0/85; DIP- 0/55   Circumference at wrist- 20.5  cm; Mps- 22.0 cm; thumb P1- 8.2 cm; MF P1- 7.1 cm  Sensation- pt notes no tingling in digits, occasional sharp shooting pain in thumb   Strength-  II- R/L- 50/90; 3 ALISSA- ; Key-     Assessment: Tolerated treatment well. Patient would benefit from continued PT      Plan: Continue per plan of care.      Precautions: activity as tolerated    Manuals 4/15 4/17  4/21  4/24  4/29             STM 15 15 15 15 15 15  15  15  15                                                                                                      Neuro Re-Ed                       HP/pulsed biph 15 15 15 15 15 15  15  15  15                                                     Ther Ex                       TP  O 2' O 2' O  2'  O 2'  O 2' O 2'  O 2'  O 2' O 2'     TP 5 finger O 2/10 O 2/10 O 2/10  O 2/10  O 2/10  O 2' 2/10  O 2'  O 2' O 2/10     Thumbciser E/F  100% 2/10 100% 2/10  100% 2/10  100% 2/10 100% 2/10 100% 2/10  100% 2/10  100% 2/10  100% 2/10      Flexbar  G/B 20/20 G/B 20/20  G/B 20/20 B 20/20  B 2/10  B 2/10  B 20/20  B 20/20  B 20/20      David  35 2/10  35 2/10  35 2/10  35 2/10  35 /10  35 /10  35 2/10  35 2/10  35 /10     Blue V 2/10 V 2/10 V 2/10  V 2/10  V 2/10  V 2/10 V 2/10  V 2/10  V 2/10     DB E/F 5 2/10 5  2/10 5 2/10 5 2/10  5 2/10  5 2/10 5 2/10  5 2/10  5 2/10           S/P 1.5 2/10 1.5 2/10 1.5 2/10 1.5 2/10 1.5 2/10 1.5 2/10 1.5 2/10 1.5 2/10 1.5 2/10     digiflex opp  R 2/10  R 2/10  R 2/10  R 2/10  R 2/10  R 2/10  R 2/10  R 2/10  R 2/10      Nibs  Y 12  Y 12 Y 12   Y 12  Y 12  Y 12  Y 12  Y 12 Y 12                                                                             Modalities

## 2025-04-29 ENCOUNTER — OFFICE VISIT (OUTPATIENT)
Facility: CLINIC | Age: 60
End: 2025-04-29
Attending: ORTHOPAEDIC SURGERY
Payer: OTHER MISCELLANEOUS

## 2025-04-29 DIAGNOSIS — G56.21 CUBITAL TUNNEL SYNDROME ON RIGHT: ICD-10-CM

## 2025-04-29 DIAGNOSIS — M18.11 PRIMARY OSTEOARTHRITIS OF FIRST CARPOMETACARPAL JOINT OF RIGHT HAND: Primary | ICD-10-CM

## 2025-04-29 PROCEDURE — 97110 THERAPEUTIC EXERCISES: CPT | Performed by: PHYSICAL THERAPIST

## 2025-04-29 PROCEDURE — 97112 NEUROMUSCULAR REEDUCATION: CPT | Performed by: PHYSICAL THERAPIST

## 2025-04-29 PROCEDURE — 97140 MANUAL THERAPY 1/> REGIONS: CPT | Performed by: PHYSICAL THERAPIST

## 2025-04-30 NOTE — PROGRESS NOTES
Daily Note     Today's date: 2025  Patient name: Fidel Calderon  : 1965  MRN: [id number]  Referring provider: Papo Castillo,*  Dx:   Encounter Diagnosis     ICD-10-CM    1. Primary osteoarthritis of first carpometacarpal joint of right hand  M18.11       2. Cubital tunnel syndrome on right  G56.21                      Subjective:  pt using his hand for increased FMS as swelling and stiffness are reduced  Objective: See treatment diary below    AROM right elbow E/F- 0/140; FA S/P- 70/70; wrist E/F- 65/45                      Thumb MP- 0/35; IP- 0/45;  -6.0 cm base of 5th                      IF- MP- 0/85; PIP- 0/85; DIP- 0/55   Circumference at wrist- 20.5  cm; Mps- 22.0 cm; thumb P1- 8.2 cm; MF P1- 7.1 cm  Sensation- pt notes no tingling in digits, occasional sharp shooting pain in thumb   Strength-  II- R/L- 50/90; 3 ALISSA- ; Key-     Assessment: Tolerated treatment well. Patient would benefit from continued PT      Plan: Continue per plan of care.      Precautions: activity as tolerated    Manuals 4/15 4/17  4/21  4/24  4/29  5/1           STM 15 15 15 15 15 15  15  15  15                                                                                                      Neuro Re-Ed                       HP/pulsed biph 15 15 15 15 15 15  15  15  15                                                     Ther Ex                       TP  O 2' O 2' O  2'  O 2'  O 2' O 2'  O 2'  O 2' O 2'     TP 5 finger O 2/10 O 2/10 O 2/10  O 2/10  O 2/10  O 2' 2/10  O 2'  O 2' O 2/10     Thumbciser E/F  100% 2/10 100% 2/10  100% 2/10  100% 2/10 100% 2/10 100% 2/10  100% 2/10  100% 2/10  100% 2/10      Flexbar  G/B 20/20 G/B 20/20  G/B 20/20 B 20/20  B 2/10  B 2/10  B 20/20  B 20/20  B 20/20      David  35 2/10  35 2/10  35 2/10  35 2/10  35 2/10  35 2/10  35 2/10  35 2/10  35 2/10     Blue V 2/10 V 2/10 V 2/10  V 2/10  V /10  V /10 V /10  V /10  V /10     DB E/F 5 2/10 5 2/10 5 2/10 5 2/10  5 2/10   5 2/10 5 2/10  5 2/10  5 2/10           S/P 1.5 2/10 1.5 2/10 1.5 2/10 1.5 2/10 1.5 2/10 1.5 2/10 1.5 2/10 1.5 2/10 1.5 2/10     digiflex opp  R 2/10  R 2/10  R 2/10  R 2/10  R 2/10  R 2/10  R 2/10  R 2/10  R 2/10      Nibs  Y 12  Y 12 Y 12   Y 12  Y 12  Y 12  Y 12  Y 12 Y 12                                                                             Modalities

## 2025-05-01 ENCOUNTER — OFFICE VISIT (OUTPATIENT)
Facility: CLINIC | Age: 60
End: 2025-05-01
Attending: ORTHOPAEDIC SURGERY
Payer: OTHER MISCELLANEOUS

## 2025-05-01 DIAGNOSIS — G56.21 CUBITAL TUNNEL SYNDROME ON RIGHT: ICD-10-CM

## 2025-05-01 DIAGNOSIS — M18.11 PRIMARY OSTEOARTHRITIS OF FIRST CARPOMETACARPAL JOINT OF RIGHT HAND: Primary | ICD-10-CM

## 2025-05-01 PROCEDURE — 97140 MANUAL THERAPY 1/> REGIONS: CPT | Performed by: PHYSICAL THERAPIST

## 2025-05-01 PROCEDURE — 97110 THERAPEUTIC EXERCISES: CPT | Performed by: PHYSICAL THERAPIST

## 2025-05-01 PROCEDURE — 97112 NEUROMUSCULAR REEDUCATION: CPT | Performed by: PHYSICAL THERAPIST

## 2025-05-03 NOTE — PROGRESS NOTES
Daily Note     Today's date: 2025  Patient name: Fidel Calderon  : 1965  MRN: [id number]  Referring provider: Papo Castillo,*  Dx: No diagnosis found.               Subjective:       Objective: See treatment diary below    AROM right elbow E/F- 0/140; FA S/P- 70/70; wrist E/F- 65/45                      Thumb MP- 0/35; IP- 0/45;  -6.0 cm base of 5th                      IF- MP- 0/85; PIP- 0/85; DIP- 0/55   Circumference at wrist- 20.5  cm; Mps- 22.0 cm; thumb P1- 8.2 cm; MF P1- 7.1 cm  Sensation- pt notes no tingling in digits, occasional sharp shooting pain in thumb   Strength-  II- R/L- 50/90; 3 ALISSA- ; Key-        Assessment: Tolerated treatment well. Patient would benefit from continued PT      Plan: Continue per plan of care.      Precautions: activity as tolerated    Manuals 4/15 4/17  4/21  4/24  4/29  5/1  5/7         STM 15 15 15 15 15 15  15  15  15                                                                                                      Neuro Re-Ed                       HP/pulsed biph 15 15 15 15 15 15  15  15  15                                                     Ther Ex                       TP  O 2' O 2' O  2'  O 2'  O 2' O 2'  O 2'  O 2' O 2'     TP 5 finger O 2/10 O 2/10 O 2/10  O 2/10  O 2/10  O 2' 2/10  O 2'  O 2' O 2/10     Thumbciser E/F  100% 2/10 100% 2/10  100% 2/10  100% 2/10 100% 2/10 100% 2/10  100% 2/10  100% 2/10  100% 2/10      Flexbar  G/B 20/20 G/B 20/20  G/B 20/20 B 20/20  B 2/10  B 2/10  B /20  B 20/20  B /20      David  35 2/10  35 2/10  35 2/10  35 2/10  35 2/10  35 2/10  35 2/10  35 2/10  35 2/10     Blue V 2/10 V 2/10 V 2/10  V 2/10  V 2/10  V 2/10 V /10  V /10  V /10     DB E/F 5 /10 5 2/10 5 2/10 5 2/10  5 2/10  5 /10 5 /10  5 /10  5 /10           S/P 1.5 2/10 1.5 2/10 1.5 2/10 1.5 /10 1.5 /10 1.5 /10 1.5 /10 1.5 /10 1.5 /10     digiflex opp  R 2/10  R 2/10  R 2/10  R 2/10  R 2/10  R 2/10  R 2/10  R 2/10  R 2/10       Nibs  Y 12  Y 12 Y 12   Y 12  Y 12  Y 12  Y 12  Y 12 Y 12                                                                             Modalities

## 2025-05-06 ENCOUNTER — APPOINTMENT (OUTPATIENT)
Facility: CLINIC | Age: 60
End: 2025-05-06
Attending: ORTHOPAEDIC SURGERY
Payer: OTHER MISCELLANEOUS

## 2025-05-07 ENCOUNTER — OFFICE VISIT (OUTPATIENT)
Dept: OBGYN CLINIC | Facility: MEDICAL CENTER | Age: 60
End: 2025-05-07
Payer: OTHER MISCELLANEOUS

## 2025-05-07 ENCOUNTER — APPOINTMENT (OUTPATIENT)
Dept: RADIOLOGY | Facility: MEDICAL CENTER | Age: 60
End: 2025-05-07
Attending: ORTHOPAEDIC SURGERY
Payer: COMMERCIAL

## 2025-05-07 VITALS — WEIGHT: 202.8 LBS | BODY MASS INDEX: 27.47 KG/M2 | HEIGHT: 72 IN

## 2025-05-07 DIAGNOSIS — Z98.890 S/P MUSCULOSKELETAL SYSTEM SURGERY: Primary | ICD-10-CM

## 2025-05-07 DIAGNOSIS — M18.12 ARTHRITIS OF CARPOMETACARPAL (CMC) JOINT OF LEFT THUMB: ICD-10-CM

## 2025-05-07 DIAGNOSIS — Z98.890 S/P CARPAL TUNNEL RELEASE: ICD-10-CM

## 2025-05-07 PROCEDURE — 73130 X-RAY EXAM OF HAND: CPT

## 2025-05-07 PROCEDURE — 99214 OFFICE O/P EST MOD 30 MIN: CPT | Performed by: ORTHOPAEDIC SURGERY

## 2025-05-07 RX ORDER — CEFAZOLIN SODIUM 2 G/50ML
2000 SOLUTION INTRAVENOUS ONCE
OUTPATIENT
Start: 2025-05-07 | End: 2025-05-07

## 2025-05-07 RX ORDER — SODIUM CHLORIDE, SODIUM LACTATE, POTASSIUM CHLORIDE, CALCIUM CHLORIDE 600; 310; 30; 20 MG/100ML; MG/100ML; MG/100ML; MG/100ML
20 INJECTION, SOLUTION INTRAVENOUS CONTINUOUS
OUTPATIENT
Start: 2025-05-07

## 2025-05-07 RX ORDER — ACETAMINOPHEN 325 MG/1
975 TABLET ORAL ONCE
OUTPATIENT
Start: 2025-05-07 | End: 2025-05-07

## 2025-05-07 NOTE — LETTER
May 7, 2025     Patient: Fidel Calderon  YOB: 1965  Date of Visit: 5/7/2025      To Whom it May Concern:    Fidel Calderon is under my professional care. Fidel CALVIN was seen in my office on 5/7/2025. Fidel may return to work with light duty restrictions including no lifting, pushing, or pulling more than 5 lbs with the right hand.     If you have any questions or concerns, please don't hesitate to call.          Sincerely,          Papo Castillo MD        CC: No Recipients

## 2025-05-07 NOTE — PROGRESS NOTES
HAND & UPPER EXTREMITY OFFICE VISIT   Referred By:  No referring provider defined for this encounter.      Chief Complaint:     Right hand pain    Surgery:  Surgery Date: 1/13/2025 - Right thumb carpometacarpal joint arthroplasty with TightRope suspension - Right, RELEASE CARPAL TUNNEL - Right - Right, and RELEASE CUBITAL TUNNEL - Right - Right     10/21/24 - Release Carpal Tunnel - Left - Left and Release Cubital Tunnel - Left, With Submuscular Transposition - Left     History of Present Illness:   Patient presents now 4 months status post the right thumb CMC arthroplasty with carpal and cubital tunnel release. He is also 6 months s/p left carpal and cubital tunnel release. he reports doing better since the last visit. His swelling and strength has been improving. He has been adapting his activities and is now able to turn a key in the ignition which was much more difficult before. He still has intermittent cramping and episodes of pain with certain activities such as pushing down and forceful gripping with the thumb.     For the left hand, he reports continued pain in the base of the thumb. He is interested in proceeding with surgery for the left thumb around the beginning of June.     Past Medical History:  Past Medical History:   Diagnosis Date    Cardiac disease     Diabetes mellitus (HCC)     Hyperlipidemia     Hypertension     MI (myocardial infarction) (HCC)     Myocardial infarction (HCC)     Sleep apnea      Past Surgical History:   Procedure Laterality Date    CARDIAC CATHETERIZATION N/A 03/06/2023    Procedure: Cardiac catheterization;  Surgeon: Fernando Panda MD;  Location: [facility];  Service: Cardiology    CARDIAC CATHETERIZATION N/A 03/06/2023    Procedure: Cardiac pci;  Surgeon: Fernando Panda MD;  [facility]: [facility];  Service: Cardiology    CARDIAC CATHETERIZATION N/A 03/06/2023    Procedure: Cardiac Coronary Angiogram;  Surgeon: Fernando Panda MD;  [facility]: [facility];  Service: Cardiology    CARDIAC CATHETERIZATION Left 03/18/2024    Procedure: Cardiac catheterization;  Surgeon: Bob Fraser MD;  [facility]: [facility];  Service: Cardiology    CARDIAC CATHETERIZATION N/A 03/18/2024    Procedure: Cardiac pci;  Surgeon: Bob Fraser MD;  [facility]: [facility];  Service: Cardiology    CARDIAC SURGERY      COLONOSCOPY      CORONARY ANGIOPLASTY WITH STENT PLACEMENT      DEBRIDEMENT TENNIS ELBOW      HERNIA REPAIR      KNEE ARTHROSCOPY      NY ARTHRP INTERCARPAL/CARP/MTCRPL JT INTERPOSITION Right 1/13/2025    Procedure: Right thumb carpometacarpal joint arthroplasty with TightRope suspension;  Surgeon: Paop Castillo MD;  Location: [facility];  Service: Orthopedics    NY NEUROPLASTY &/TRANSPOS MEDIAN NRV CARPAL TUNNE Left 10/21/2024    Procedure: RELEASE CARPAL TUNNEL - LEFT;  Surgeon: Papo Castillo MD;  [facility];  Service: Orthopedics    NY NEUROPLASTY &/TRANSPOS MEDIAN NRV CARPAL TUNNE Right 1/13/2025    Procedure: RELEASE CARPAL TUNNEL - Right;  Surgeon: Papo Castillo MD;  [facility];  Service: Orthopedics    NY NEUROPLASTY &/TRANSPOSITION ULNAR NERVE ELBOW Left 10/21/2024    Procedure: RELEASE CUBITAL TUNNEL - LEFT, WITH SUBMUSCULAR TRANSPOSITION;  Surgeon: Papo Castillo MD;  [facility];  Service: Orthopedics    NY NEUROPLASTY &/TRANSPOSITION ULNAR NERVE ELBOW Right 1/13/2025    Procedure: RELEASE CUBITAL TUNNEL - Right;  Surgeon: Papo Castillo MD;  [facility];  Service: Orthopedics    SHOULDER ARTHROSCOPY      VASECTOMY       Family History   Problem Relation Age of Onset    Hypertension Maternal Grandfather     Diabetes type I Maternal Grandmother     Hypertension Paternal Grandfather     Diabetes type II Paternal Grandmother      Social History     Socioeconomic History    Marital status: /Civil Union     Spouse name: Not on file    Number of children: Not on  file    Years of education: Not on file    Highest education level: Not on file   Occupational History    Not on file   Tobacco Use    Smoking status: Former     Current packs/day: 0.00     Average packs/day: 0.5 packs/day for 28.0 years (14.0 ttl pk-yrs)     Types: Cigarettes     Start date: 1977     Quit date: 2005     Years since quittin.3    Smokeless tobacco: Former     Types: Chew     Quit date: 1985   Vaping Use    Vaping status: Never Used   Substance and Sexual Activity    Alcohol use: Yes     Alcohol/week: 2.0 standard drinks of alcohol     Types: 2 Shots of liquor per week     Comment: 2 drinks weekly    Drug use: No    Sexual activity: Yes   Other Topics Concern    Not on file   Social History Narrative    Not on file     Social Drivers of Health     Financial Resource Strain: Not on file   Food Insecurity: Not on file   Transportation Needs: Not on file   Physical Activity: Not on file   Stress: Not on file   Social Connections: Not on file   Intimate Partner Violence: Not on file   Housing Stability: Not on file     Scheduled Meds:  Continuous Infusions:No current facility-administered medications for this visit.    PRN Meds:.  Allergies   Allergen Reactions    Banana - Food Allergy Throat Swelling    Tetanus Toxoid Other (See Comments)       Physical Examination:    Ht 6' (1.829 m)   Wt 92 kg (202 lb 12.8 oz)   BMI 27.50 kg/m²     Gen: A&Ox3, NAD    Right Upper Extremity:  Incisions well-healed without signs of infection   Minimal residual swelling of thumb and hand  Sensation intact to light touch in the axillary median, ulnar, and radial nerve distributions  Able to form composite fist and oppose thumb to small finger middle phalanx  Warm, well-perfused digits  Cap refill <2s      Left Upper Extremity:   Incision well-healed without signs of infection.  Non-tender around incision sites  Sensation intact to light touch in the axillary median, ulnar, and radial nerve  distributions  Full digital and elbow ROM  Warm, well-perfused digits  Cap refill <2s      Studies:  Radiographs: I personally reviewed and independently interpreted the available radiographs.  5/7/25: Radiographs of the right hand, multiple views, demonstrate stable surgical changes s/p thumb CMC arthroplasty. No evidence of hardware failure or subsidence.     Assessment & Plan  S/P musculoskeletal system surgery  59 y.o. male presents 4 months status post Right thumb carpometacarpal joint arthroplasty with TightRope suspension - Right, RELEASE CARPAL TUNNEL - Right - Right, and RELEASE CUBITAL TUNNEL - Right - Right, and 6 months s/p left carpal and cubital tunnel release. He reports overall making good progress since the last visit. Encouraged patient to continue working with PT/OT and progressing his ROM and strengthening as tolerated with the right hand. Updated work note provided to return with light duty restrictions.     Orders:    XR hand 3+ vw right; Future    Arthritis of carpometacarpal (CMC) joint of left thumb  He continues to have pain in the left thumb and and would like to proceed with surgery for his left side. We reviewed the risks of surgery including infection, bleeding, injury to nearby structures including the nerve, poor wound healing, pillar pain, stiffness, CRPS, and incomplete resolution or recurrence of symptoms. We reviewed the details of the procedure and the anticipated recovery period. All questions answered to patient's satisfaction. Written consent obtained in the office today.     Left thumb CMC arthroplasty with tightrope suspension  Regional with sedation    Orders:    Case request operating room: LEFT THUMB CARPOMETACARPAL JOINT ARTHROPLASTY WITH TIGHTROPE SUSPENSION - LEFT; Standing    Ambulatory Referral to PT/OT Hand Therapy; Future        he expressed understanding of the plan and agreed. We encouraged them to contact our office with any questions or concerns.         Papo  DARLINE Castillo MD  Hand and Upper Extremity Surgery          *This note was dictated using Dragon voice recognition software. Please excuse any word substitutions or errors.*      Scribe Attestation      I,:  Genevieve Hoover PA-C am acting as a scribe while in the presence of the attending physician.:       I,:  Papo Castillo MD personally performed the services described in this documentation    as scribed in my presence.:

## 2025-05-07 NOTE — H&P
HAND & UPPER EXTREMITY OFFICE VISIT   Referred By:  No referring provider defined for this encounter.      Chief Complaint:     Right hand pain    Surgery:  Surgery Date: 1/13/2025 - Right thumb carpometacarpal joint arthroplasty with TightRope suspension - Right, RELEASE CARPAL TUNNEL - Right - Right, and RELEASE CUBITAL TUNNEL - Right - Right     10/21/24 - Release Carpal Tunnel - Left - Left and Release Cubital Tunnel - Left, With Submuscular Transposition - Left     History of Present Illness:   Patient presents now 4 months status post the right thumb CMC arthroplasty with carpal and cubital tunnel release. He is also 6 months s/p left carpal and cubital tunnel release. he reports doing better since the last visit. His swelling and strength has been improving. He has been adapting his activities and is now able to turn a key in the ignition which was much more difficult before. He still has intermittent cramping and episodes of pain with certain activities such as pushing down and forceful gripping with the thumb.     For the left hand, he reports continued pain in the base of the thumb. He is interested in proceeding with surgery for the left thumb around the beginning of June.     Past Medical History:  Past Medical History:   Diagnosis Date    Cardiac disease     Diabetes mellitus (HCC)     Hyperlipidemia     Hypertension     MI (myocardial infarction) (HCC)     Myocardial infarction (HCC)     Sleep apnea      Past Surgical History:   Procedure Laterality Date    CARDIAC CATHETERIZATION N/A 03/06/2023    Procedure: Cardiac catheterization;  Surgeon: Fernando Panda MD;  Location: [facility];  Service: Cardiology    CARDIAC CATHETERIZATION N/A 03/06/2023    Procedure: Cardiac pci;  Surgeon: Fernando Panda MD;  [facility];  Service: Cardiology    CARDIAC CATHETERIZATION N/A 03/06/2023    Procedure: Cardiac Coronary Angiogram;  Surgeon: Fernando Panda MD;  [facility];  Service: Cardiology    CARDIAC CATHETERIZATION Left 03/18/2024    Procedure: Cardiac catheterization;  Surgeon: Bob Fraser MD;  [facility];  Service: Cardiology    CARDIAC CATHETERIZATION N/A 03/18/2024    Procedure: Cardiac pci;  Surgeon: Bob Fraser MD;  [facility];  Service: Cardiology    CARDIAC SURGERY      COLONOSCOPY      CORONARY ANGIOPLASTY WITH STENT PLACEMENT      DEBRIDEMENT TENNIS ELBOW      HERNIA REPAIR      KNEE ARTHROSCOPY      [place] ARTHRP INTERCARPAL/CARP/MTCRPL JT INTERPOSITION Right 1/13/2025    Procedure: Right thumb carpometacarpal joint arthroplasty with TightRope suspension;  Surgeon: Papo Castillo MD;  [facility];  Service: Orthopedics    NC NEUROPLASTY &/TRANSPOS MEDIAN NRV CARPAL TUNNE Left 10/21/2024    Procedure: RELEASE CARPAL TUNNEL - LEFT;  Surgeon: Papo Castillo MD;  [facility];  Service: Orthopedics    NC NEUROPLASTY &/TRANSPOS MEDIAN NRV CARPAL TUNNE Right 1/13/2025    Procedure: RELEASE CARPAL TUNNEL - Right;  Surgeon: Papo Castillo MD;  [facility];  Service: Orthopedics    NC NEUROPLASTY &/TRANSPOSITION ULNAR NERVE ELBOW Left 10/21/2024    Procedure: RELEASE CUBITAL TUNNEL - LEFT, WITH SUBMUSCULAR TRANSPOSITION;  Surgeon: Papo Castillo MD;  [facility];  Service: Orthopedics    NC NEUROPLASTY &/TRANSPOSITION ULNAR NERVE ELBOW Right 1/13/2025    Procedure: RELEASE CUBITAL TUNNEL - Right;  Surgeon: Papo Castillo MD;  [facility];  Service: Orthopedics    SHOULDER ARTHROSCOPY      VASECTOMY       Family History   Problem Relation Age of Onset    Hypertension Maternal Grandfather     Diabetes type I Maternal Grandmother     Hypertension Paternal Grandfather     Diabetes type II Paternal Grandmother      Social History     Socioeconomic History    Marital status: /Civil Union     Spouse name: Not on file    Number of children: Not on  file    Years of education: Not on file    Highest education level: Not on file   Occupational History    Not on file   Tobacco Use    Smoking status: Former     Current packs/day: 0.00     Average packs/day: 0.5 packs/day for 28.0 years (14.0 ttl pk-yrs)     Types: Cigarettes     Start date: 1977     Quit date: 2005     Years since quittin.3    Smokeless tobacco: Former     Types: Chew     Quit date: 1985   Vaping Use    Vaping status: Never Used   Substance and Sexual Activity    Alcohol use: Yes     Alcohol/week: 2.0 standard drinks of alcohol     Types: 2 Shots of liquor per week     Comment: 2 drinks weekly    Drug use: No    Sexual activity: Yes   Other Topics Concern    Not on file   Social History Narrative    Not on file     Social Drivers of Health     Financial Resource Strain: Not on file   Food Insecurity: Not on file   Transportation Needs: Not on file   Physical Activity: Not on file   Stress: Not on file   Social Connections: Not on file   Intimate Partner Violence: Not on file   Housing Stability: Not on file     Scheduled Meds:  Continuous Infusions:No current facility-administered medications for this visit.    PRN Meds:.  Allergies   Allergen Reactions    Banana - Food Allergy Throat Swelling    Tetanus Toxoid Other (See Comments)       Physical Examination:    Ht 6' (1.829 m)   Wt 92 kg (202 lb 12.8 oz)   BMI 27.50 kg/m²     Gen: A&Ox3, NAD    Right Upper Extremity:  Incisions well-healed without signs of infection   Minimal residual swelling of thumb and hand  Sensation intact to light touch in the axillary median, ulnar, and radial nerve distributions  Able to form composite fist and oppose thumb to small finger middle phalanx  Warm, well-perfused digits  Cap refill <2s      Left Upper Extremity:   Incision well-healed without signs of infection.  Non-tender around incision sites  Sensation intact to light touch in the axillary median, ulnar, and radial nerve  distributions  Full digital and elbow ROM  Warm, well-perfused digits  Cap refill <2s      Studies:  Radiographs: I personally reviewed and independently interpreted the available radiographs.  5/7/25: Radiographs of the right hand, multiple views, demonstrate stable surgical changes s/p thumb CMC arthroplasty. No evidence of hardware failure or subsidence.     Assessment & Plan  S/P musculoskeletal system surgery  59 y.o. male presents 4 months status post Right thumb carpometacarpal joint arthroplasty with TightRope suspension - Right, RELEASE CARPAL TUNNEL - Right - Right, and RELEASE CUBITAL TUNNEL - Right - Right, and 6 months s/p left carpal and cubital tunnel release. He reports overall making good progress since the last visit. Encouraged patient to continue working with PT/OT and progressing his ROM and strengthening as tolerated with the right hand. Updated work note provided to return with light duty restrictions.     Orders:    XR hand 3+ vw right; Future    Arthritis of carpometacarpal (CMC) joint of left thumb  He continues to have pain in the left thumb and and would like to proceed with surgery for his left side. We reviewed the risks of surgery including infection, bleeding, injury to nearby structures including the nerve, poor wound healing, pillar pain, stiffness, CRPS, and incomplete resolution or recurrence of symptoms. We reviewed the details of the procedure and the anticipated recovery period. All questions answered to patient's satisfaction. Written consent obtained in the office today.     Left thumb CMC arthroplasty with tightrope suspension  Regional with sedation    Orders:    Case request operating room: LEFT THUMB CARPOMETACARPAL JOINT ARTHROPLASTY WITH TIGHTROPE SUSPENSION - LEFT; Standing    Ambulatory Referral to PT/OT Hand Therapy; Future        he expressed understanding of the plan and agreed. We encouraged them to contact our office with any questions or concerns.         Papo  DARLINE Castillo MD  Hand and Upper Extremity Surgery          *This note was dictated using Dragon voice recognition software. Please excuse any word substitutions or errors.*      Scribe Attestation      I,:  Genevieve Hoover PA-C am acting as a scribe while in the presence of the attending physician.:       I,:  Papo Castillo MD personally performed the services described in this documentation    as scribed in my presence.:

## 2025-05-08 ENCOUNTER — OFFICE VISIT (OUTPATIENT)
Facility: CLINIC | Age: 60
End: 2025-05-08
Attending: ORTHOPAEDIC SURGERY
Payer: OTHER MISCELLANEOUS

## 2025-05-08 DIAGNOSIS — G56.21 CUBITAL TUNNEL SYNDROME ON RIGHT: Primary | ICD-10-CM

## 2025-05-08 DIAGNOSIS — M18.11 PRIMARY OSTEOARTHRITIS OF FIRST CARPOMETACARPAL JOINT OF RIGHT HAND: ICD-10-CM

## 2025-05-08 PROCEDURE — 97110 THERAPEUTIC EXERCISES: CPT | Performed by: PHYSICAL THERAPIST

## 2025-05-08 PROCEDURE — 97112 NEUROMUSCULAR REEDUCATION: CPT | Performed by: PHYSICAL THERAPIST

## 2025-05-08 PROCEDURE — 97140 MANUAL THERAPY 1/> REGIONS: CPT | Performed by: PHYSICAL THERAPIST

## 2025-05-08 NOTE — PROGRESS NOTES
Daily Note     Today's date: 2025  Patient name: Fidel Calderon  : 1965  MRN: [id number]  Referring provider: Papo Castillo,*  Dx:   Encounter Diagnosis     ICD-10-CM    1. Cubital tunnel syndrome on right  G56.21       2. Primary osteoarthritis of first carpometacarpal joint of right hand  M18.11                      Subjective: pt was seen by Dr. Castillo and did schedule surgery for the left thumb.      Objective: See treatment diary below    AROM right elbow E/F- 0/140; FA S/P- 70/70; wrist E/F- 65/45                      Thumb MP- 0/35; IP- 0/45;  -6.0 cm base of 5th                      IF- MP- 0/85; PIP- 0/85; DIP- 0/55   Circumference at wrist- 20.5  cm; Mps- 22.0 cm; thumb P1- 8.2 cm; MF P1- 7.1 cm  Sensation- pt notes no tingling in digits, occasional sharp shooting pain in thumb   Strength-  II- R/L- 50/90; 3 ALISSA- ; Key-     Assessment: Tolerated treatment well. Patient would benefit from continued PT      Plan: Continue per plan of care.      Precautions: activity as tolerated    Manuals                STM 15 15 15 15 15 15  15  15  15                                                                                                      Neuro Re-Ed                       HP/pulsed biph 15 15 15 15 15 15  15  15  15                                                     Ther Ex                       TP  O 2' O 2' O  2'  O 2'  O 2' O 2'  O 2'  O 2' O 2'     TP 5 finger O 2/10 O 2/10 O 2/10  O 2/10  O 2/10  O 2' 2/10  O 2'  O 2' O 2/10     Thumbciser E/F  100% 2/10 100% 2/10  100% 2/10  100% 2/10 100% 2/10 100% 2/10  100% 2/10  100% 2/10  100% 2/10      Flexbar B 20/20 G/B 20/20  G/B 20/20 B 20/20  B 2/10  B 2/10  B 20/20  B 20/20  B 20/20      David  35 2/10  35 2/10  35 2/10  35 2/10  35 2/10  35 2/10  35 2/10  35 2/10  35 2/10     Blue V 2/10 V 2/10 V 2/10  V 2/10  V 2/10  V 2/10 V 2/10  V 2/10  V /10     DB E/F 5 2/10 5 2/10 5 2/10 5 2/10  5 2/10  5 2/10 5 2/10  5 2/10  5  2/10           S/P 1.5 2/10 1.5 2/10 1.5 2/10 1.5 2/10 1.5 2/10 1.5 2/10 1.5 2/10 1.5 2/10 1.5 2/10     digiflex opp  R 2/10  R 2/10  R 2/10  R 2/10  R 2/10  R 2/10  R 2/10  R 2/10  R 2/10      Nibs  Y 12  Y 12 Y 12   Y 12  Y 12  Y 12  Y 12  Y 12 Y 12                                                                             Modalities

## 2025-05-15 ENCOUNTER — OFFICE VISIT (OUTPATIENT)
Facility: CLINIC | Age: 60
End: 2025-05-15
Attending: ORTHOPAEDIC SURGERY
Payer: OTHER MISCELLANEOUS

## 2025-05-15 DIAGNOSIS — M18.11 PRIMARY OSTEOARTHRITIS OF FIRST CARPOMETACARPAL JOINT OF RIGHT HAND: ICD-10-CM

## 2025-05-15 DIAGNOSIS — G56.21 CUBITAL TUNNEL SYNDROME ON RIGHT: Primary | ICD-10-CM

## 2025-05-15 PROCEDURE — 97112 NEUROMUSCULAR REEDUCATION: CPT | Performed by: PHYSICAL THERAPIST

## 2025-05-15 PROCEDURE — 97110 THERAPEUTIC EXERCISES: CPT | Performed by: PHYSICAL THERAPIST

## 2025-05-15 PROCEDURE — 97140 MANUAL THERAPY 1/> REGIONS: CPT | Performed by: PHYSICAL THERAPIST

## 2025-05-15 NOTE — PROGRESS NOTES
Daily Note     Today's date: 5/15/2025  Patient name: Fidel Calderon  : 1965  MRN: [id number]  Referring provider: Papo Castillo,*  Dx: No diagnosis found.               Subjective: ***      Objective: See treatment diary below    AROM right elbow E/F- 0/140; FA S/P- 70/70; wrist E/F- 65/45                      Thumb MP- 0/35; IP- 0/45;  -6.0 cm base of 5th                      IF- MP- 0/85; PIP- 0/85; DIP- 0/55   Circumference at wrist- 20.5  cm; Mps- 22.0 cm; thumb P1- 8.2 cm; MF P1- 7.1 cm  Sensation- pt notes no tingling in digits, occasional sharp shooting pain in thumb   Strength-  II- R/L- 50/90; 3 ALISSA- ; Key-     Assessment: Tolerated treatment well. Patient would benefit from continued PT      Plan: Continue per plan of care.      Precautions: activity as tolerated    Manuals 5/8 5/15              STM 15 15 15 15 15 15  15  15  15                                                                                                      Neuro Re-Ed                       HP/pulsed biph 15 15 15 15 15 15  15  15  15                                                     Ther Ex                       TP  O 2' O 2' O  2'  O 2'  O 2' O 2'  O 2'  O 2' O 2'     TP 5 finger O 2/10 O 2/10 O 2/10  O 2/10  O 2/10  O 2' 2/10  O 2'  O 2' O 2/10     Thumbciser E/F  100% 2/10 100% 2/10  100% 2/10  100% 2/10 100% 2/10 100% 2/10  100% 2/10  100% 2/10  100% 2/10      Flexbar B 20/20 B 20/20 B 20/20 B 20/20  B 2/10  B 2/10  B 20/20  B 20/20  B 20/20      David  35 2/10  35 2/10  35 2/10  35 2/10  35 2/10  35 2/10  35 2/10  35 2/10  35 2/10     Blue V 2/10 V 2/10 V 2/10  V 2/10  V 2/10  V 2/10 V 2/10  V 2/10  V 2/10     DB E/F 5 2/10 5 2/10 5 2/10 5 2/10  5 2/10  5 2/10 5 2/10  5 2/10  5 2/10           S/P 1.5 2/10 1.5 2/10 1.5 2/10 1.5 2/10 1.5 2/10 1.5 2/10 1.5 2/10 1.5 2/10 1.5 2/10     digiflex opp  R 2/10  R 2/10  R 2/10  R 2/10  R 2/10  R 2/10  R 2/10  R 2/10  R 2/10      Nibs  Y 12  Y 12 Y 12   Y 12   Y 12  Y 12  Y 12  Y 12 Y 12                                                                             Modalities

## 2025-05-15 NOTE — PROGRESS NOTES
PT Re-Evaluation     Today's date: 5/15/2025  Patient name: Fidel Calderon  : 1965  MRN: [id number]  Referring provider: Papo Castillo,*  Dx:   Encounter Diagnosis     ICD-10-CM    1. Cubital tunnel syndrome on right  G56.21       2. Primary osteoarthritis of first carpometacarpal joint of right hand  M18.11                      Assessment    Assessment details: Pt is a 58 YO male presenting to PT with pain, decreased AROM, strength and tolerance to activity.  Pt would benefit from skilled intervention to address these issues and maximize overall function.  Occupation- [employer]- currently off  Dominant- left ; Involved- right thumb,  CT, CuT     Pt progressing steadily with AROM of the thumb.  Mild pain persists with motion and activity.  Swelling is decreasing as his activity level improves and he is more tolerant of loading with  and pinch.    Goals    ST.  Decrease pain to 0-2/10 in 4-8 weeks to ease ADL and self care            2.  Decrease swelling 0.5 cm in 4-8 weeks            3.  Increase AROM 10-20 degrees in 4 weeks for improved ability to bathe, dress, and assist in functional activity            4.  Provide orthotic for protection, compression for support            5.  Instruct in activity modification for ADL and self care with independence in 4-6 weeks            6.  Instruct in HEP   LT.  Increase functional AROM to assist with independence in 8-12 weeks            2.  [place] with HEP in 4-6 weeks            3.  Increase  strength by 2-5 lbs in 8 weeks            4. Recreational activities are improved to maximum level in 12 weeks.            5.  ADL performance is improved to maximal level of function in 12 weeks.            6. Ability to RTW by DC        Plan  Patient would benefit from: skilled physical therapy  Planned modality interventions: cryotherapy, thermotherapy: hydrocollator packs and ultrasound    Planned therapy interventions: activity  modification, manual therapy, strengthening, stretching, therapeutic activities, therapeutic exercise and home exercise program    Frequency: 2x week  Duration in weeks: 4  Treatment plan discussed with: patient      Subjective Evaluation    History of Present Illness  Date of surgery: 2025  Mechanism of injury: Pt with persistent pain in his right thumb, numbness and tingling in his right hand.  Dr. Castillo completed surgery for right thumb [facility] tight rope suspension plasty, Carpal and cubital tunnel release omn the right  Pt with history of left CT and CuT release 10/24.    Patient Goals  Patient goals for therapy: decreased edema, increased motion, decreased pain, increased strength, independence with ADLs/IADLs, return to sport/leisure activities and return to work    Pain  Current pain ratin  At best pain ratin  At worst pain ratin  Quality: dull ache and sharp    Hand dominance: left    Treatments  Current treatment: physical therapy        Objective     General Comments:      Wrist/Hand Comments  AROM right elbow E/F- 0/140; FA S/P- 70/70; wrist E/F- 65/45                      Thumb MP- 0/35; IP- 0/45;  -6.0 cm base of 5th                      IF- MP- 0/85; PIP- 0/85; DIP- 0/55   Circumference at wrist- 20.5  cm; Mps- 22.0 cm; thumb P1- 8.2 cm; MF P1- 7.1 cm  Sensation- pt notes no tingling in digits, occasional sharp shooting pain in thumb   Strength-  II- R/L- 65/110; 3 ALISSA- 10/21; Key-              Precautions: activity as tolerated    Manuals 5/8  5/15                   STM 15 15 15 15 15 15  15  15  15                                                                                                      Neuro Re-Ed                       HP/pulsed biph 15 15 15 15 15 15  15  15  15                                                     Ther Ex                       TP  O 2' O 2' O  2'  O 2'  O 2' O 2'  O 2'  O 2' O 2'     TP 5 finger O 2/10 O 2/10 O 2/10  O 2/10  O 2/10  O 2' 2/10  O  2'  O 2' O 2/10     Thumbciser E/F  100% 2/10 100% 2/10  100% 2/10  100% 2/10 100% 2/10 100% 2/10  100% 2/10  100% 2/10  100% 2/10      Flexbar B 20/20 B 20/20  G/B 20/20 B 20/20  B 2/10  B 2/10  B 20/20  B 20/20  B 20/20      David  35 2/10  35 2/10  35 2/10  35 2/10  35 2/10  35 2/10  35 2/10  35 2/10  35 2/10     Blue V 2/10 V 2/10 V 2/10  V 2/10  V 2/10  V 2/10 V 2/10  V 2/10  V 2/10     DB E/F 5 2/10 6 2/10 5 2/10 5 2/10  5 2/10  5 2/10 5 2/10  5 2/10  5 2/10           S/P 1.5 2/10 1.5 2/10 1.5 2/10 1.5 2/10 1.5 2/10 1.5 2/10 1.5 2/10 1.5 2/10 1.5 2/10      digiflex opp  R 2/10  R 2/10  R 2/10  R 2/10  R 2/10  R 2/10  R 2/10  R 2/10  R 2/10      Nibs  Y 12  Y 12 Y 12   Y 12  Y 12  Y 12  Y 12  Y 12 Y 12                                                                             Modalities

## 2025-05-16 NOTE — PROGRESS NOTES
Daily Note     Today's date: 2025  Patient name: Fidel Calderon  : 1965  MRN: [id number]  Referring provider: Papo Castillo,*  Dx:   Encounter Diagnosis     ICD-10-CM    1. Cubital tunnel syndrome on right  G56.21       2. Primary osteoarthritis of first carpometacarpal joint of right hand  M18.11                      Subjective: feeling better- using hand for increased activity      Objective: See treatment diary below    AROM right elbow E/F- 0/140; FA S/P- 70/70; wrist E/F- 65/45                      Thumb MP- 0/35; IP- 0/45;  -6.0 cm base of 5th                      IF- MP- 0/85; PIP- 0/85; DIP- 0/55   Circumference at wrist- 20.5  cm; Mps- 22.0 cm; thumb P1- 8.2 cm; MF P1- 7.1 cm  Sensation- pt notes no tingling in digits, occasional sharp shooting pain in thumb   Strength-  II- R/L- 65/110; 3 ALISSA- 10/21; Key-      Assessment: Tolerated treatment well. Patient would benefit from continued PT      Plan: Continue per plan of care.      Precautions: activity as tolerated    Manuals 5/8  5/15  5/20                 STM 15 15 15 15 15 15  15  15  15                                                                                                      Neuro Re-Ed                       HP/pulsed biph 15 15 15 15 15 15  15  15  15                                                     Ther Ex                       TP  O 2' O 2' O  2'  O 2'  O 2' O 2'  O 2'  O 2' O 2'     TP 5 finger O 2/10 O 2/10 O 2/10  O 2/10  O 2/10  O 2' 2/10  O 2'  O 2' O 2/10     Thumbciser E/F  100% 2/10 100% 2/10  100% 2/10  100% 2/10 100% 2/10 100% 2/10  100% 2/10  100% 2/10  100% 2/10      Flexbar B 20/20 B 20/20 B 20/20 B 20/20  B 2/10  B 2/10  B 20/20  B 20/20  B 20/20      David  35 2/10  35 2/10  35 2/10  35 2/10  35 2/10  35 2/10  35 2/10  35 2/10  35 2/10     Blue V 2/10 V 2/10 V 2/10  V 2/10  V 2/10  V 2/10 V 2/10  V 2/10  V 2/10     DB E/F 5 2/10 6 2/10 6 2/10 5 2/10  5 2/10  5 2/10 5 2/10  5 2/10  5 2/10            S/P 1.5 2/10 1.5 2/10 1.5 2/10 1.5 2/10 1.5 2/10 1.5 2/10 1.5 2/10 1.5 2/10 1.5 2/10      digiflex opp  R 2/10  R 2/10  R 2/10  R 2/10  R 2/10  R 2/10  R 2/10  R 2/10  R 2/10      Nibs  Y 12  Y 12 Y 12   Y 12  Y 12  Y 12  Y 12  Y 12 Y 12                                                                             Modalities

## 2025-05-20 ENCOUNTER — OFFICE VISIT (OUTPATIENT)
Facility: CLINIC | Age: 60
End: 2025-05-20
Attending: ORTHOPAEDIC SURGERY
Payer: OTHER MISCELLANEOUS

## 2025-05-20 DIAGNOSIS — M18.11 PRIMARY OSTEOARTHRITIS OF FIRST CARPOMETACARPAL JOINT OF RIGHT HAND: ICD-10-CM

## 2025-05-20 DIAGNOSIS — G56.21 CUBITAL TUNNEL SYNDROME ON RIGHT: Primary | ICD-10-CM

## 2025-05-20 PROCEDURE — 97110 THERAPEUTIC EXERCISES: CPT | Performed by: PHYSICAL THERAPIST

## 2025-05-20 PROCEDURE — 97112 NEUROMUSCULAR REEDUCATION: CPT | Performed by: PHYSICAL THERAPIST

## 2025-05-20 PROCEDURE — 97140 MANUAL THERAPY 1/> REGIONS: CPT | Performed by: PHYSICAL THERAPIST

## 2025-05-22 ENCOUNTER — APPOINTMENT (OUTPATIENT)
Facility: CLINIC | Age: 60
End: 2025-05-22
Attending: ORTHOPAEDIC SURGERY
Payer: OTHER MISCELLANEOUS

## 2025-05-22 NOTE — PROGRESS NOTES
Daily Note     Today's date: 2025  Patient name: Fidel Calderon  : 1965  MRN: [id number]  Referring provider: Papo Castillo,*  Dx: No diagnosis found.               Subjective: ***      Objective: See treatment diary below    AROM right elbow E/F- 0/140; FA S/P- 70/70; wrist E/F- 65/45                      Thumb MP- 0/35; IP- 0/45;  -6.0 cm base of 5th                      IF- MP- 0/85; PIP- 0/85; DIP- 0/55   Circumference at wrist- 20.5  cm; Mps- 22.0 cm; thumb P1- 8.2 cm; MF P1- 7.1 cm  Sensation- pt notes no tingling in digits, occasional sharp shooting pain in thumb   Strength-  II- R/L- 65/110; 3 ALISSA- 10/21; Key-      Assessment: Tolerated treatment well. Patient would benefit from continued PT      Plan: Continue per plan of care.      Precautions: activity as tolerated    Manuals 5/8  5/15  5/20  5/22               STM 15 15 15 15 15 15  15  15  15                                                                                                      Neuro Re-Ed                       HP/pulsed biph 15 15 15 15 15 15  15  15  15                                                     Ther Ex                       TP  O 2' O 2' O  2'  O 2'  O 2' O 2'  O 2'  O 2' O 2'     TP 5 finger O 2/10 O 2/10 O 2/10  O 2/10  O 2/10  O 2' 2/10  O 2'  O 2' O 2/10     Thumbciser E/F  100% 2/10 100% 2/10  100% 2/10  100% 2/10 100% 2/10 100% 2/10  100% 2/10  100% 2/10  100% 2/10      Flexbar B 20/20 B 20/20 B 20/20 B 20/20  B 2/10  B 2/10  B 20/20  B 20/20  B 20/20      David  35 2/10  35 2/10  35 2/10  35 2/10  35 2/10  35 2/10  35 2/10  35 2/10  35 2/10     Blue V 2/10 V 2/10 V 2/10  VI 2/10  V 2/10  V 2/10 V 2/10  V 2/10  V 2/10     DB E/F 5 2/10 6 2/10 6 2/10 6 2/10  5 2/10  5 2/10 5 2/10  5 2/10  5 2/10           S/P 1.5 2/10 1.5 2/10 1.5 2/10 1.5 2/10 1.5 2/10 1.5 2/10 1.5 2/10 1.5 2/10 1.5 2/10      digiflex opp  R 2/10  R 210  R 2/10  R 2/10  R 2/10  R 210  R /10  R 10  R 2/10      Nibs  Y 12   Y 12 Y 12   Y 12  Y 12  Y 12  Y 12  Y 12 Y 12                                                                             Modalities

## 2025-05-24 NOTE — PROGRESS NOTES
Daily Note     Today's date: 2025  Patient name: Fidel Calderon  : 1965  MRN: [id number]  Referring provider: Papo Castillo,*  Dx:   Encounter Diagnosis     ICD-10-CM    1. Cubital tunnel syndrome on right  G56.21       2. Primary osteoarthritis of first carpometacarpal joint of right hand  M18.11                      Subjective: pt considering deferring left thumb surgery vs RTW      Objective: See treatment diary below    AROM right elbow E/F- 0/140; FA S/P- 70/70; wrist E/F- 65/45                      Thumb MP- 0/35; IP- 0/45;  -6.0 cm base of 5th                      IF- MP- 0/85; PIP- 0/85; DIP- 0/55   Circumference at wrist- 20.5  cm; Mps- 22.0 cm; thumb P1- 8.2 cm; MF P1- 7.1 cm  Sensation- pt notes no tingling in digits, occasional sharp shooting pain in thumb   Strength-  II- R/L- 65/110; 3 ALISSA- 10/21; Key-     Assessment: Tolerated treatment well. Patient would benefit from continued PT      Plan: Continue per plan of care.      Precautions: activity as tolerated    Manuals 5/8  5/15  5/20  5/22  5/27             STM 15 15 15 15 15 15  15  15  15                                                                                                      Neuro Re-Ed                       HP/pulsed biph 15 15 15 15 15 15  15  15  15                                                     Ther Ex                       TP  O 2' O 2' O  2'  O 2'  O 2' O 2'  O 2'  O 2' O 2'     TP 5 finger O 2/10 O 2/10 O 2/10  O 2/10  O 2/10  O 2' 2/10  O 2'  O 2' O 2/10     Thumbciser E/F  100% 2/10 100% 2/10  100% 2/10  100% 2/10 100% 2/10 100% 2/10  100% 2/10  100% 2/10  100% 2/10      Flexbar B 20/20 B 20/20 B 20/20 B 20/20  B 2/10  B 2/10  B 20/20  B 20/20  B 20/20      David  35 2/10  35 2/10  35 2/10  35 2/10  35 2/10  35 2/10  35 2/10  35 2/10  35 2/10     Blue V 2/10 V 2/10 V 2/10  VI 2/10  VI 2/10  V 2/10 V 2/10  V 2/10  V 2/10     DB E/F 5 2/10 6 2/10 6 2/10 6 2/10  6 2/10  5 2/10 5 2/10  5 2/10  5  2/10           S/P 1.5 2/10 1.5 2/10 1.5 2/10 1.5 2/10 1.5 2/10 1.5 2/10 1.5 2/10 1.5 2/10 1.5 2/10      digiflex opp  R 2/10  R 2/10  R 2/10  R 2/10  R 2/10  R 2/10  R 2/10  R 2/10  R 2/10      Nibs  Y 12  Y 12 Y 12   Y 12  Y 12  Y 12  Y 12  Y 12 Y 12                                                                             Modalities

## 2025-05-27 ENCOUNTER — OFFICE VISIT (OUTPATIENT)
Facility: CLINIC | Age: 60
End: 2025-05-27
Attending: ORTHOPAEDIC SURGERY
Payer: OTHER MISCELLANEOUS

## 2025-05-27 DIAGNOSIS — M18.11 PRIMARY OSTEOARTHRITIS OF FIRST CARPOMETACARPAL JOINT OF RIGHT HAND: ICD-10-CM

## 2025-05-27 DIAGNOSIS — G56.21 CUBITAL TUNNEL SYNDROME ON RIGHT: Primary | ICD-10-CM

## 2025-05-27 PROCEDURE — 97110 THERAPEUTIC EXERCISES: CPT | Performed by: PHYSICAL THERAPIST

## 2025-05-27 PROCEDURE — 97140 MANUAL THERAPY 1/> REGIONS: CPT | Performed by: PHYSICAL THERAPIST

## 2025-05-27 PROCEDURE — 97112 NEUROMUSCULAR REEDUCATION: CPT | Performed by: PHYSICAL THERAPIST

## 2025-05-28 DIAGNOSIS — E11.69 TYPE 2 DIABETES MELLITUS WITH OTHER SPECIFIED COMPLICATION, WITHOUT LONG-TERM CURRENT USE OF INSULIN (HCC): ICD-10-CM

## 2025-05-28 DIAGNOSIS — I25.10 CORONARY ARTERY DISEASE INVOLVING NATIVE CORONARY ARTERY OF NATIVE HEART WITHOUT ANGINA PECTORIS: ICD-10-CM

## 2025-05-28 DIAGNOSIS — E78.00 HYPERCHOLESTEROLEMIA: ICD-10-CM

## 2025-05-28 DIAGNOSIS — Z95.5 S/P DRUG ELUTING CORONARY STENT PLACEMENT: ICD-10-CM

## 2025-05-28 DIAGNOSIS — I10 PRIMARY HYPERTENSION: ICD-10-CM

## 2025-05-28 DIAGNOSIS — E11.21 TYPE 2 DIABETES MELLITUS WITH DIABETIC NEPHROPATHY, WITHOUT LONG-TERM CURRENT USE OF INSULIN (HCC): ICD-10-CM

## 2025-05-29 ENCOUNTER — OFFICE VISIT (OUTPATIENT)
Facility: CLINIC | Age: 60
End: 2025-05-29
Attending: ORTHOPAEDIC SURGERY
Payer: OTHER MISCELLANEOUS

## 2025-05-29 DIAGNOSIS — M18.11 PRIMARY OSTEOARTHRITIS OF FIRST CARPOMETACARPAL JOINT OF RIGHT HAND: ICD-10-CM

## 2025-05-29 DIAGNOSIS — G56.21 CUBITAL TUNNEL SYNDROME ON RIGHT: Primary | ICD-10-CM

## 2025-05-29 PROCEDURE — 97112 NEUROMUSCULAR REEDUCATION: CPT | Performed by: PHYSICAL THERAPIST

## 2025-05-29 PROCEDURE — 97110 THERAPEUTIC EXERCISES: CPT | Performed by: PHYSICAL THERAPIST

## 2025-05-29 PROCEDURE — 97140 MANUAL THERAPY 1/> REGIONS: CPT | Performed by: PHYSICAL THERAPIST

## 2025-05-29 RX ORDER — ROSUVASTATIN CALCIUM 40 MG/1
40 TABLET, COATED ORAL DAILY
Qty: 90 TABLET | Refills: 1 | Status: SHIPPED | OUTPATIENT
Start: 2025-05-29

## 2025-05-29 RX ORDER — EMPAGLIFLOZIN 25 MG/1
25 TABLET, FILM COATED ORAL EVERY MORNING
Qty: 90 TABLET | Refills: 1 | Status: SHIPPED | OUTPATIENT
Start: 2025-05-29

## 2025-05-29 RX ORDER — METOPROLOL SUCCINATE 25 MG/1
25 TABLET, EXTENDED RELEASE ORAL DAILY
Qty: 90 TABLET | Refills: 1 | Status: SHIPPED | OUTPATIENT
Start: 2025-05-29

## 2025-05-29 RX ORDER — IRBESARTAN 75 MG/1
75 TABLET ORAL DAILY
Qty: 90 TABLET | Refills: 1 | Status: SHIPPED | OUTPATIENT
Start: 2025-05-29

## 2025-05-29 NOTE — PROGRESS NOTES
Daily Note     Today's date: 2025  Patient name: Fidel Calderon  : 1965  MRN: [id number]  Referring provider: Papo Castillo,*  Dx:   Encounter Diagnosis     ICD-10-CM    1. Cubital tunnel syndrome on right  G56.21       2. Primary osteoarthritis of first carpometacarpal joint of right hand  M18.11                      Subjective: pt to reschedule surgery for the left thumb.  Right thumb approximately 70%      Objective: See treatment diary below    AROM right elbow E/F- 0/140; FA S/P- 70/70; wrist E/F- 65/45                      Thumb MP- 0/35; IP- 0/45;  -6.0 cm base of 5th                      IF- MP- 0/85; PIP- 0/85; DIP- 0/55   Circumference at wrist- 20.5  cm; Mps- 22.0 cm; thumb P1- 8.2 cm; MF P1- 7.1 cm  Sensation- pt notes no tingling in digits, occasional sharp shooting pain in thumb   Strength-  II- R/L- 65/110; 3 ALISSA- 10/21; Key-     Assessment: Tolerated treatment well. Patient would benefit from continued PT      Plan: Continue per plan of care.      Precautions: activity as tolerated    Manuals 5/8  5/15  5/20  5/22  5/27  5/29           STM 15 15 15 15 15 15  15  15  15                                                                                                      Neuro Re-Ed                       HP/pulsed biph 15 15 15 15 15 15  15  15  15                                                     Ther Ex                       TP  O 2' O 2' O  2'  O 2'  O 2' O 2'  O 2'  O 2' O 2'     TP 5 finger O 2/10 O 2/10 O 2/10  O 2/10  O 2/10  O 2' 2/10  O 2'  O 2' O 2/10     Thumbciser E/F  100% 2/10 100% 2/10  100% 2/10  100% 2/10 100% 2/10 100% 2/10  100% 2/10  100% 2/10  100% 2/10      Flexbar B 20/20 B 20/20 B 20/20 B 20/20  B 2/10  B 2/10  B 20/20  B 20/20  B 20/20      David  35 2/10  35 2/10  35 2/10  35 2/10  35 2/10  35 2/10  35 2/10  35 2/10  35 2/10     Blue V 2/10 V 2/10 V 2/10  VI 2/10  VI 2/10  VI 2/10 V 2/10  V 2/10  V 2/10     DB E/F 5 2/10 6 2/10 6 2/10 6 2/10  6  2/10  6 2/10 5 2/10  5 2/10  5 2/10           S/P 1.5 2/10 1.5 2/10 1.5 2/10 1.5 2/10 1.5 2/10 1.5 2/10 1.5 2/10 1.5 2/10 1.5 2/10      digiflex opp  R 2/10  R 2/10  R 2/10  R 2/10  R 2/10  R 2/10  R 2/10  R 2/10  R 2/10      Nibs  Y 12  Y 12 Y 12   Y 12  Y 12  Y 12  Y 12  Y 12 Y 12                                                                             Modalities

## 2025-05-30 DIAGNOSIS — I10 HYPERTENSION, UNSPECIFIED TYPE: ICD-10-CM

## 2025-05-31 RX ORDER — TICAGRELOR 90 MG/1
90 TABLET, FILM COATED ORAL 2 TIMES DAILY
Qty: 180 TABLET | Refills: 3 | Status: SHIPPED | OUTPATIENT
Start: 2025-05-31 | End: 2025-06-04 | Stop reason: SDUPTHER

## 2025-06-01 NOTE — PROGRESS NOTES
Daily Note     Today's date: 2025  Patient name: Fidel Calderon  : 1965  MRN: [id number]  Referring provider: Papo Castillo,*  Dx:   Encounter Diagnosis     ICD-10-CM    1. Cubital tunnel syndrome on right  G56.21       2. Primary osteoarthritis of first carpometacarpal joint of right hand  M18.11                      Subjective: pt rescheduled for surgery on the left thumb [facility] 25      Objective: See treatment diary below    AROM right elbow E/F- 0/140; FA S/P- 70/70; wrist E/F- 65/45                      Thumb MP- 0/35; IP- 0/45;  -6.0 cm base of 5th                      IF- MP- 0/85; PIP- 0/85; DIP- 0/55   Circumference at wrist- 20.5  cm; Mps- 22.0 cm; thumb P1- 8.2 cm; MF P1- 7.1 cm  Sensation- pt notes no tingling in digits, occasional sharp shooting pain in thumb   Strength-  II- R/L- 65/110; 3 ALISSA- 10/21; Key-     Assessment: Tolerated treatment well. Patient would benefit from continued PT      Plan: Continue per plan of care.      Precautions: activity as tolerated    Manuals                 STM 15 15 15 15 15 15  15  15  15                                                                                                      Neuro Re-Ed                       HP/pulsed biph 15 15 15 15 15 15  15  15  15                                                     Ther Ex                       TP  O 2' O 2' O  2'  O 2'  O 2' O 2'  O 2'  O 2' O 2'     TP 5 finger O 2/10 O 2/10 O 2/10  O 2/10  O 2/10  O 2' 2/10  O 2'  O 2' O 2/10     Thumbciser E/F  100% 2/10 100% 2/10  100% 2/10  100% 2/10 100% 2/10 100% 2/10  100% 2/10  100% 2/10  100% 2/10      Flexbar B 20/20 B 20/20 B 20/20 B 20/20  B 2/10  B 2/10  B 20/20  B 20/20  B 20/20      David  35 2/10  35 2/10  35 2/10  35 2/10  35 2/10  35 2/10  35 2/10  35 2/10  35 2/10     Blue V 2/10 VI 2/10 V 2/10  VI 2/10  VI 2/10  VI 2/10 V 2/10  V 2/10  V 2/10     DB E/F 6 2/10 6 2/10 6 2/10 6 2/10  6 2/10  6 2/10 5 2/10  5 2/10  5 2/10            S/P 1.5 2/10 1.5 2/10 1.5 2/10 1.5 2/10 1.5 2/10 1.5 2/10 1.5 2/10 1.5 2/10 1.5 2/10      digiflex opp  G 2/10  G 2/10  R 2/10  R 2/10  R 2/10  R 2/10  R 2/10  R 2/10  R 2/10      Nibs  Y 12  Y 12 Y 12   Y 12  Y 12  Y 12  Y 12  Y 12 Y 12                                                                             Modalities

## 2025-06-02 RX ORDER — AMLODIPINE BESYLATE 5 MG/1
5 TABLET ORAL DAILY
Qty: 90 TABLET | Refills: 3 | Status: SHIPPED | OUTPATIENT
Start: 2025-06-02

## 2025-06-03 ENCOUNTER — APPOINTMENT (OUTPATIENT)
Facility: CLINIC | Age: 60
End: 2025-06-03
Attending: ORTHOPAEDIC SURGERY
Payer: OTHER MISCELLANEOUS

## 2025-06-04 DIAGNOSIS — E11.69 TYPE 2 DIABETES MELLITUS WITH OTHER SPECIFIED COMPLICATION, WITHOUT LONG-TERM CURRENT USE OF INSULIN (HCC): ICD-10-CM

## 2025-06-04 NOTE — TELEPHONE ENCOUNTER
NOT A DUPLICATE   [facility] told the patient that the doctor cancelled out the order and he doesn't have to be on this medication anymore. If he does need to still be on it then they need a new script sent over.     I told patient that nothing looks to be cancelled out on our end. Please review and send in a new script to [facility] if possible.    Reason for call:   [x] Refill   [] Prior Auth  [] Other:     Office:   [] PCP/Provider -   [x] Specialty/Provider - Cardiology     Medication: Brilinta     Dose/Frequency: 90 mg tablet taken by mouth twice a day     Quantity: 180    Pharmacy: [facility] - [address] [phone]     Local Pharmacy   Does the patient have enough for 3 days?   [] Yes   [x] No - Send as HP to POD    Mail Away Pharmacy   Does the patient have enough for 10 days?   [] Yes   [] No - Send as HP to POD

## 2025-06-05 ENCOUNTER — OFFICE VISIT (OUTPATIENT)
Facility: CLINIC | Age: 60
End: 2025-06-05
Attending: ORTHOPAEDIC SURGERY
Payer: OTHER MISCELLANEOUS

## 2025-06-05 DIAGNOSIS — G56.21 CUBITAL TUNNEL SYNDROME ON RIGHT: Primary | ICD-10-CM

## 2025-06-05 DIAGNOSIS — M18.11 PRIMARY OSTEOARTHRITIS OF FIRST CARPOMETACARPAL JOINT OF RIGHT HAND: ICD-10-CM

## 2025-06-05 PROCEDURE — 97112 NEUROMUSCULAR REEDUCATION: CPT | Performed by: PHYSICAL THERAPIST

## 2025-06-05 PROCEDURE — 97140 MANUAL THERAPY 1/> REGIONS: CPT | Performed by: PHYSICAL THERAPIST

## 2025-06-05 PROCEDURE — 97110 THERAPEUTIC EXERCISES: CPT | Performed by: PHYSICAL THERAPIST

## 2025-06-05 RX ORDER — TICAGRELOR 90 MG/1
90 TABLET, FILM COATED ORAL 2 TIMES DAILY
Qty: 180 TABLET | Refills: 3 | Status: SHIPPED | OUTPATIENT
Start: 2025-06-05

## 2025-06-09 NOTE — PROGRESS NOTES
Daily Note     Today's date: 2025  Patient name: Fidel Calderon  : 1965  MRN: [id number]  Referring provider: Papo Castillo,*  Dx: No diagnosis found.               Subjective: ***      Objective: See treatment diary below    AROM right elbow E/F- 0/140; FA S/P- 70/70; wrist E/F- 65/45                      Thumb MP- 0/35; IP- 0/45;  -6.0 cm base of 5th                      IF- MP- 0/85; PIP- 0/85; DIP- 0/55   Circumference at wrist- 20.5  cm; Mps- 22.0 cm; thumb P1- 8.2 cm; MF P1- 7.1 cm  Sensation- pt notes no tingling in digits, occasional sharp shooting pain in thumb   Strength-  II- R/L- 65/110; 3 ALISSA- 10/21; Key-     Assessment: Tolerated treatment well. Patient would benefit from continued PT      Plan: Continue per plan of care.      Precautions: activity as tolerated    Manuals 6/5 6/10               STM 15 15 15 15 15 15  15  15  15                                                                                                      Neuro Re-Ed                       HP/pulsed biph 15 15 15 15 15 15  15  15  15                                                     Ther Ex                       TP  O 2' O 2' O  2'  O 2'  O 2' O 2'  O 2'  O 2' O 2'     TP 5 finger O 2/10 O 2/10 O 2/10  O 2/10  O 2/10  O 2' 2/10  O 2'  O 2' O 2/10     Thumbciser E/F  100% 2/10 100% 2/10  100% 2/10  100% 2/10 100% 2/10 100% 2/10  100% 2/10  100% 2/10  100% 2/10      Flexbar B 20/20 B 20/20 B 20/20 B 20/20  B 2/10  B 2/10  B 20/20  B 20/20  B 20/20      David  35 2/10  35 2/10  35 2/10  35 2/10  35 2/10  35 2/10  35 2/10  35 2/10  35 2/10     Blue V 2/10 VI 2/10 V 2/10  VI 2/10  VI 2/10  VI 2/10 V 2/10  V 2/10  V 2/10     DB E/F 6 2/10 6 2/10 6 2/10 6 2/10  6 2/10  6 2/10 5 2/10  5 2/10  5 2/10           S/P 1.5 2/10 1.5 2/10 1.5 2/10 1.5 2/10 1.5 2/10 1.5 2/10 1.5 2/10 1.5 /10 1.5 /10      digiflex opp  G 2/10  G 2/10  R 2/10  R 2/10  R 2/10  R 2/10  R 2/10  R 2/10  R 2/10      Nibs  Y 12  Y 12 Y 12    Y 12  Y 12  Y 12  Y 12  Y 12 Y 12                                                                             Modalities

## 2025-06-10 ENCOUNTER — OFFICE VISIT (OUTPATIENT)
Facility: CLINIC | Age: 60
End: 2025-06-10
Attending: ORTHOPAEDIC SURGERY
Payer: OTHER MISCELLANEOUS

## 2025-06-10 DIAGNOSIS — M18.11 PRIMARY OSTEOARTHRITIS OF FIRST CARPOMETACARPAL JOINT OF RIGHT HAND: ICD-10-CM

## 2025-06-10 DIAGNOSIS — G56.21 CUBITAL TUNNEL SYNDROME ON RIGHT: Primary | ICD-10-CM

## 2025-06-10 PROCEDURE — 97140 MANUAL THERAPY 1/> REGIONS: CPT | Performed by: PHYSICAL THERAPIST

## 2025-06-10 PROCEDURE — 97110 THERAPEUTIC EXERCISES: CPT | Performed by: PHYSICAL THERAPIST

## 2025-06-10 PROCEDURE — 97112 NEUROMUSCULAR REEDUCATION: CPT | Performed by: PHYSICAL THERAPIST

## 2025-06-10 NOTE — PROGRESS NOTES
PT Re-Evaluation     Today's date: 6/10/2025  Patient name: Fidel Calderon  : 1965  MRN: [id number]  Referring provider: Papo Castillo,*  Dx:   Encounter Diagnosis     ICD-10-CM    1. Cubital tunnel syndrome on right  G56.21       2. Primary osteoarthritis of first carpometacarpal joint of right hand  M18.11                      Assessment    Assessment details: Pt is a 60 YO male presenting to PT with pain, decreased AROM, strength and tolerance to activity.  Pt would benefit from skilled intervention to address these issues and maximize overall function.  Occupation- [employer]- currently off  Dominant- left ; Involved- right thumb,  CT, CuT     Pt progressing steadily with AROM of the thumb.  Mild pain persists with motion and activity.  Swelling is decreasing as his activity level improves and he is more tolerant of loading with  and pinch.    Goals    ST.  Decrease pain to 0-2/10 in 4-8 weeks to ease ADL and self care            2.  Decrease swelling 0.5 cm in 4-8 weeks            3.  Increase AROM 10-20 degrees in 4 weeks for improved ability to bathe, dress, and assist in functional activity            4.  Provide orthotic for protection, compression for support            5.  Instruct in activity modification for ADL and self care with independence in 4-6 weeks            6.  Instruct in HEP   LT.  Increase functional AROM to assist with independence in 8-12 weeks            2.  [place] with HEP in 4-6 weeks            3.  Increase  strength by 2-5 lbs in 8 weeks            4. Recreational activities are improved to maximum level in 12 weeks.            5.  ADL performance is improved to maximal level of function in 12 weeks.            6. Ability to RTW by DC        Plan  Patient would benefit from: skilled physical therapy  Planned modality interventions: cryotherapy, thermotherapy: hydrocollator packs and ultrasound    Planned therapy interventions: activity  modification, manual therapy, strengthening, stretching, therapeutic activities, therapeutic exercise and home exercise program    Frequency: 2x week  Duration in weeks: 4  Treatment plan discussed with: patient      Subjective Evaluation    History of Present Illness  Date of surgery: 2025  Mechanism of injury: Pt with persistent pain in his right thumb, numbness and tingling in his right hand.  Dr. Castillo completed surgery for right thumb CMC tight rope suspension plasty, Carpal and cubital tunnel release omn the right  Pt with history of left CT and CuT release 10/24.    Patient Goals  Patient goals for therapy: decreased edema, increased motion, decreased pain, increased strength, independence with ADLs/IADLs, return to sport/leisure activities and return to work    Pain  Current pain ratin  At best pain ratin  At worst pain ratin  Quality: dull ache and sharp    Hand dominance: left    Treatments  Current treatment: physical therapy      Objective     General Comments:      Wrist/Hand Comments  AROM right elbow E/F- 0/140; FA S/P- 70/70; wrist E/F- 65/45                      Thumb MP- 0/40; IP- 0/65;  -2.5 cm base of 5th                      IF- MP- 0/85; PIP- 0/85; DIP- 0/55 - composite fist and extension  Circumference at wrist- 20.5  cm; Mps- 22.0 cm; thumb P1- 8.2 cm; MF P1- 7.1 cm  Sensation- pt notes no tingling in digits, occasional sharp shooting pain in thumb   Strength-  II- R/L- 65/110; 3 ALISSA- 10/21; Key-                 Precautions: activity as tolerated    Manuals 6/5                     STM 15 15 15 15 15 15  15  15  15                                                                                                      Neuro Re-Ed                       HP/pulsed biph 15 15 15 15 15 15  15  15  15                                                     Ther Ex                       TP  O 2' O 2' O  2'  O 2'  O 2' O 2'  O 2'  O 2' O 2'     TP 5 finger O 2/10 O 2/10 O 2/10  O  2/10  O 2/10  O 2' 2/10  O 2'  O 2' O 2/10     Thumbciser E/F  100% 2/10 100% 2/10  100% 2/10  100% 2/10 100% 2/10 100% 2/10  100% 2/10  100% 2/10  100% 2/10      Flexbar B 20/20 B 20/20 B 20/20 B 20/20  B 2/10  B 2/10  B 20/20  B 20/20  B 20/20      David  35 2/10  35 2/10  35 2/10  35 2/10  35 2/10  35 2/10  35 2/10  35 2/10  35 2/10     Blue V 2/10 VI 2/10 V 2/10  VI 2/10  VI 2/10  VI 2/10 V 2/10  V 2/10  V 2/10     DB E/F 6 2/10 6 2/10 6 2/10 6 2/10  6 2/10  6 2/10 5 2/10  5 2/10  5 2/10           S/P 1.5 2/10 1.5 2/10 1.5 2/10 1.5 2/10 1.5 2/10 1.5 2/10 1.5 2/10 1.5 2/10 1.5 2/10      digiflex opp  G 2/10  G 2/10  R 2/10  R 2/10  R 2/10  R 2/10  R 2/10  R 2/10  R 2/10      Nibs  Y 12  Y 12 Y 12   Y 12  Y 12  Y 12  Y 12  Y 12 Y 12                                                                             Modalities

## 2025-06-11 NOTE — PROGRESS NOTES
Daily Note     Today's date: 2025  Patient name: Fidel Calderon  : 1965  MRN: [id number]  Referring provider: Papo Castillo,*  Dx:   Encounter Diagnosis     ICD-10-CM    1. Cubital tunnel syndrome on right  G56.21       2. Primary osteoarthritis of first carpometacarpal joint of right hand  M18.11                      Subjective: pt stronger with less soreness in his thumb and IF.  No tingling noted      Objective: See treatment diary below    AROM right elbow E/F- 0/140; FA S/P- 70/70; wrist E/F- 65/45                      Thumb MP- 0/40; IP- 0/65;  -2.5 cm base of 5th                      IF- MP- 0/85; PIP- 0/85; DIP- 0/55 - composite fist and extension  Circumference at wrist- 20.5  cm; Mps- 22.0 cm; thumb P1- 8.2 cm; MF P1- 7.1 cm  Sensation- pt notes no tingling in digits, occasional sharp shooting pain in thumb   Strength-  II- R/L- 65/110; 3 ALISSA- 10/21; Key-      Assessment: Tolerated treatment well. Patient would benefit from continued PT      Plan: Continue per plan of care.      Precautions: activity as tolerated    Manuals                    STM 15 15 15 15 15 15  15  15  15                                                                                                      Neuro Re-Ed                       HP/pulsed biph 15 15 15 15 15 15  15  15  15                                                     Ther Ex                       TP  O 2' O 2' O  2'  O 2'  O 2' O 2'  O 2'  O 2' O 2'     TP 5 finger O 2/10 O 2/10 O 2/10  O 2/10  O 2/10  O 2' 2/10  O 2'  O 2' O 2/10     Thumbciser E/F  100% 2/10 100% 2/10  100% 2/10  100% 2/10 100% 2/10 100% 2/10  100% 2/10  100% 2/10  100% 2/10      Flexbar B 20/20 B 20/20 B 20/20 B 20/20  B 2/10  B 2/10  B 20/20  B 20/20  B 20/20      David  35 2/10  35 2/10  35 2/10  35 2/10  35 2/10  35 2/10  35 2/10  35 2/10  35 2/10     Blue V 2/10 VI 2/10 V 2/10  VI 2/10  VI /10  VI /10 V 10  V 10  V 10     DB E/F 6 2/10 6 2/10 6 2/10  6 2/10  6 2/10  6 2/10 5 2/10  5 2/10  5 2/10           S/P 1.5 2/10 1.5 2/10 1.5 2/10 1.5 2/10 1.5 2/10 1.5 2/10 1.5 2/10 1.5 2/10 1.5 2/10      digiflex opp  G 2/10  G 2/10  R 2/10  R 2/10  R 2/10  R 2/10  R 2/10  R 2/10  R 2/10      Nibs  Y 12  Y 12 Y 12   Y 12  Y 12  Y 12  Y 12  Y 12 Y 12                                                                             Modalities

## 2025-06-12 ENCOUNTER — OFFICE VISIT (OUTPATIENT)
Facility: CLINIC | Age: 60
End: 2025-06-12
Attending: ORTHOPAEDIC SURGERY
Payer: OTHER MISCELLANEOUS

## 2025-06-12 DIAGNOSIS — G56.21 CUBITAL TUNNEL SYNDROME ON RIGHT: Primary | ICD-10-CM

## 2025-06-12 DIAGNOSIS — M18.11 PRIMARY OSTEOARTHRITIS OF FIRST CARPOMETACARPAL JOINT OF RIGHT HAND: ICD-10-CM

## 2025-06-12 PROCEDURE — 97140 MANUAL THERAPY 1/> REGIONS: CPT | Performed by: PHYSICAL THERAPIST

## 2025-06-12 PROCEDURE — 97110 THERAPEUTIC EXERCISES: CPT | Performed by: PHYSICAL THERAPIST

## 2025-06-12 PROCEDURE — 97112 NEUROMUSCULAR REEDUCATION: CPT | Performed by: PHYSICAL THERAPIST

## 2025-06-15 ENCOUNTER — ANESTHESIA EVENT (OUTPATIENT)
Age: 60
End: 2025-06-15
Payer: OTHER MISCELLANEOUS

## 2025-06-16 NOTE — PROGRESS NOTES
Daily Note     Today's date: 2025  Patient name: Fidel Calderon  : 1965  MRN: [id number]  Referring provider: Papo Castillo,*  Dx: No diagnosis found.               Subjective: ***      Objective: See treatment diary below    AROM right elbow E/F- 0/140; FA S/P- 70/70; wrist E/F- 65/45                      Thumb MP- 0/40; IP- 0/65;  -2.5 cm base of 5th                      IF- MP- 0/85; PIP- 0/85; DIP- 0/55 - composite fist and extension  Circumference at wrist- 20.5  cm; Mps- 22.0 cm; thumb P1- 8.2 cm; MF P1- 7.1 cm  Sensation- pt notes no tingling in digits, occasional sharp shooting pain in thumb   Strength-  II- R/L- 65/110; 3 ALISSA- 10/21; Key-     Assessment: Tolerated treatment well. Patient would benefit from continued PT      Plan: Continue per plan of care.      Precautions: activity as tolerated    Manuals                  STM 15 15 15 15 15 15  15  15  15                                                                                                      Neuro Re-Ed                       HP/pulsed biph 15 15 15 15 15 15  15  15  15                                                     Ther Ex                       TP  O 2' O 2' O  2'  O 2'  O 2' O 2'  O 2'  O 2' O 2'     TP 5 finger O 2/10 O 2/10 O 2/10  O 2/10  O 2/10  O 2' 2/10  O 2'  O 2' O 2/10     Thumbciser E/F  100% 2/10 100% 2/10  100% 2/10  100% 2/10 100% 2/10 100% 2/10  100% 2/10  100% 2/10  100% 2/10      Flexbar B 20/20 B 20/20 B 20/20 B 20/20  B 2/10  B 2/10  B 20/20  B 20/20  B 20/20      David  35 2/10  35 2/10  35 2/10  35 2/10  35 2/10  35 2/10  35 2/10  35 2/10  35 2/10     Blue V 2/10 VI 2/10 V 2/10  VI 2/10  VI 2/10  VI 2/10 V 2/10  V 2/10  V 2/10     DB E/F 6 2/10 6 2/10 6 2/10 6 2/10  6 2/10  6 2/10 5 2/10  5 2/10  5 2/10           S/P 1.5 2/10 1.5 2/10 1.5 2/10 1.5 2/10 1.5 2/10 1.5 2/10 1.5 2/10 1.5 2/10 1.5 2/10      digiflex opp  G 2/10  G 2/10  G 2/10  G 2/10  G 2/10  G 2/10  G 2/10  G  2/10  G 2/10      Nibs  Y 12  Y 12 Y 12   Y 12  Y 12  Y 12  Y 12  Y 12 Y 12                                                                             Modalities

## 2025-06-17 ENCOUNTER — OFFICE VISIT (OUTPATIENT)
Facility: CLINIC | Age: 60
End: 2025-06-17
Attending: ORTHOPAEDIC SURGERY
Payer: OTHER MISCELLANEOUS

## 2025-06-17 DIAGNOSIS — M18.11 PRIMARY OSTEOARTHRITIS OF FIRST CARPOMETACARPAL JOINT OF RIGHT HAND: ICD-10-CM

## 2025-06-17 DIAGNOSIS — G56.21 CUBITAL TUNNEL SYNDROME ON RIGHT: Primary | ICD-10-CM

## 2025-06-17 PROCEDURE — 97112 NEUROMUSCULAR REEDUCATION: CPT | Performed by: PHYSICAL THERAPIST

## 2025-06-17 PROCEDURE — 97140 MANUAL THERAPY 1/> REGIONS: CPT | Performed by: PHYSICAL THERAPIST

## 2025-06-17 PROCEDURE — 97110 THERAPEUTIC EXERCISES: CPT | Performed by: PHYSICAL THERAPIST

## 2025-06-17 NOTE — PROGRESS NOTES
PT Discharge    Today's date: 2025  Patient name: Fidel Calderon  : 1965  MRN: [id number]  Referring provider: Papo Castillo,*  Dx:   Encounter Diagnosis     ICD-10-CM    1. Cubital tunnel syndrome on right  G56.21       2. Primary osteoarthritis of first carpometacarpal joint of right hand  M18.11                      Assessment    Assessment details: Pt is a 60 YO male presenting to PT with pain, decreased AROM, strength and tolerance to activity.  Pt would benefit from skilled intervention to address these issues and maximize overall function.  Occupation- [employer]- currently off  Dominant- left ; Involved- right thumb,  CT, CuT     Pt progressing steadily with AROM of the thumb.  Mild pain persists with motion and activity.  Swelling is decreasing as his activity level improves and he is more tolerant of loading with  and pinch.  Pt will transition to a HEP awaiting surgery on his left thumb.      Goals    ST.  Decrease pain to 0-2/10 in 4-8 weeks to ease ADL and self care            2.  Decrease swelling 0.5 cm in 4-8 weeks            3.  Increase AROM 10-20 degrees in 4 weeks for improved ability to bathe, dress, and assist in functional activity            4.  Provide orthotic for protection, compression for support            5.  Instruct in activity modification for ADL and self care with independence in 4-6 weeks            6.  Instruct in HEP   LT.  Increase functional AROM to assist with independence in 8-12 weeks            2.  [place] with HEP in 4-6 weeks            3.  Increase  strength by 2-5 lbs in 8 weeks            4. Recreational activities are improved to maximum level in 12 weeks.            5.  ADL performance is improved to maximal level of function in 12 weeks.            6. Ability to RTW by DC    Goals met    Plan  Patient would benefit from: skilled physical therapy  Planned modality interventions: cryotherapy, thermotherapy:  hydrocollator packs and ultrasound    Planned therapy interventions: activity modification, manual therapy, strengthening, stretching, therapeutic activities, therapeutic exercise and home exercise program    Frequency: 2x week  Duration in weeks: 4  Treatment plan discussed with: patient        Subjective Evaluation    History of Present Illness  Date of surgery: 2025  Mechanism of injury: Pt with persistent pain in his right thumb, numbness and tingling in his right hand.  Dr. Castillo completed surgery for right thumb CMC tight rope suspension plasty, Carpal and cubital tunnel release omn the right  Pt with history of left CT and CuT release 10/24.    Patient Goals  Patient goals for therapy: decreased edema, increased motion, decreased pain, increased strength, independence with ADLs/IADLs, return to sport/leisure activities and return to work    Pain  Current pain ratin  At best pain ratin  At worst pain ratin  Quality: dull ache and sharp    Hand dominance: left    Treatments  Current treatment: physical therapy        Objective     General Comments:      Wrist/Hand Comments  AROM right elbow E/F- 0/140; FA S/P- 70/70; wrist E/F- 65/45                      Thumb MP- 0/40; IP- 0/65;  -2.5 cm base of 5th                      IF- MP- 0/85; PIP- 0/85; DIP- 0/55 - composite fist and extension  Circumference at wrist- 20.5  cm; Mps- 22.0 cm; thumb P1- 8.2 cm; MF P1- 7.1 cm  Sensation- pt notes no tingling in digits, occasional sharp shooting pain in thumb   Strength-  II- R/L- 65/110; 3 ALISSA- 10/21; Key-                 Precautions: activity as tolerated    Manuals                  STM 15 15 15 15 15 15  15  15  15                                                                                                      Neuro Re-Ed                       HP/pulsed biph 15 15 15 15 15 15  15  15  15                                                     Ther Ex                       TP   O 2' O 2' O  2'  O 2'  O 2' O 2'  O 2'  O 2' O 2'     TP 5 finger O 2/10 O 2/10 O 2/10  O 2/10  O 2/10  O 2' 2/10  O 2'  O 2' O 2/10     Thumbciser E/F  100% 2/10 100% 2/10  100% 2/10  100% 2/10 100% 2/10 100% 2/10  100% 2/10  100% 2/10  100% 2/10      Flexbar B 20/20 B 20/20 B 20/20 B 20/20  B 2/10  B 2/10  B 20/20  B 20/20  B 20/20      David  35 2/10  35 2/10  35 2/10  35 2/10  35 2/10  35 2/10  35 2/10  35 2/10  35 2/10     Blue V 2/10 VI 2/10 VI 2/10  VI 2/10  VI 2/10  VI 2/10 V 2/10  V 2/10  V 2/10     DB E/F 6 2/10 6 2/10 6 2/10 6 2/10  6 2/10  6 2/10 5 2/10  5 2/10  5 2/10           S/P 1.5 2/10 1.5 2/10 1.5 2/10 1.5 2/10 1.5 2/10 1.5 2/10 1.5 2/10 1.5 2/10 1.5 2/10      digiflex opp  G 2/10  G 2/10  G 2/10  R 2/10  R 2/10  R 2/10  R 2/10  R 2/10  R 2/10      Nibs  Y 12  Y 12 R 12   Y 12  Y 12  Y 12  Y 12  Y 12 Y 12                                                                             Modalities

## 2025-06-18 DIAGNOSIS — E11.59 TYPE 2 DIABETES MELLITUS WITH OTHER CIRCULATORY COMPLICATION, WITHOUT LONG-TERM CURRENT USE OF INSULIN (HCC): ICD-10-CM

## 2025-06-18 RX ORDER — SEMAGLUTIDE 1.34 MG/ML
INJECTION, SOLUTION SUBCUTANEOUS
Qty: 9 ML | Refills: 1 | Status: SHIPPED | OUTPATIENT
Start: 2025-06-18

## 2025-06-19 ENCOUNTER — APPOINTMENT (OUTPATIENT)
Facility: CLINIC | Age: 60
End: 2025-06-19
Attending: ORTHOPAEDIC SURGERY
Payer: OTHER MISCELLANEOUS

## 2025-06-19 NOTE — PRE-PROCEDURE INSTRUCTIONS
Pre-Surgery Instructions:   Medication Instructions    amLODIPine (NORVASC) 5 mg tablet Take day of surgery.    aspirin (ECOTRIN LOW STRENGTH) 81 mg EC tablet Take day of surgery.    Cholecalciferol (VITAMIN D) 2000 units CAPS Hold day of surgery.    Empagliflozin (Jardiance) 25 MG TABS Stop taking 4 days prior to surgery. Last dose 6/26    irbesartan (AVAPRO) 75 mg tablet Hold day of surgery.    metFORMIN (GLUCOPHAGE) 1000 MG tablet Hold day of surgery.    metoprolol succinate (TOPROL-XL) 25 mg 24 hr tablet Take day of surgery.    nitroglycerin (NITROSTAT) 0.4 mg SL tablet Uses PRN- OK to take day of surgery    NON FORMULARY Stop taking 7 days prior to surgery. Cinnamon supplement, last dose 6/22    rosuvastatin (CRESTOR) 40 MG tablet Take night before surgery    semaglutide, 1 mg/dose, (Ozempic, 1 MG/DOSE,) 4 mg/3 mL injection pen Stop taking 7 days prior to surgery. Last dose 6/22    tadalafil (CIALIS) 10 MG tablet Stop taking 2 days prior to surgery.    ticagrelor (BRILINTA) 90 MG Stop taking 5 days prior to surgery. Last dose 6/24- If this is anything other than a 5 day hold, a nurse will reach out to you    zolpidem (AMBIEN) 10 mg tablet Take night before surgery as needed    Medication instructions for day of surgery reviewed. Please take all instructed medications with only a sip of water. Please do not take any over the counter (non-prescribed) vitamins or supplements for one week prior to date of surgery.      You will receive a call one business day prior to surgery with an arrival time and hospital directions. If your surgery is scheduled on a Monday, the hospital will be calling you on the Friday prior to your surgery. If you have not heard from anyone by 8pm, please call the hospital supervisor through the hospital  at [phone]. ([place] [phone] or [place] [phone]).    Do not eat or drink anything after midnight the night before your surgery, including candy, mints, lifesavers,  or chewing gum. Do not drink alcohol 24hrs before your surgery. Try not to smoke at least 24hrs before your surgery.       Follow the pre surgery showering instructions as listed in the “My Surgical Experience Booklet” or otherwise provided by your surgeon's office. Do not use a blade to shave the surgical area 1 week before surgery. It is okay to use a clean electric clippers up to 24 hours before surgery. Do not apply any lotions, creams, including makeup, cologne, deodorant, or perfumes after showering on the day of your surgery. Do not use dry shampoo, hair spray, hair gel, or any type of hair products.     No contact lenses, eye make-up, or artificial eyelashes. Remove nail polish, including gel polish, and any artificial, gel, or acrylic nails if possible. Remove all jewelry including rings and body piercing jewelry.     Wear causal clothing that is easy to take on and off. Consider your type of surgery.    Keep any valuables, jewelry, piercings at home. Please bring any specially ordered equipment (sling, braces) if indicated.    Arrange for a responsible person to drive you to and from the hospital on the day of your surgery. Please confirm the visitor policy for the day of your procedure when you receive your phone call with an arrival time.     Call the surgeon's office with any new illnesses, exposures, or additional questions prior to surgery.    Please reference your “My Surgical Experience Booklet” for additional information to prepare for your upcoming surgery.

## 2025-06-26 ENCOUNTER — APPOINTMENT (OUTPATIENT)
Facility: CLINIC | Age: 60
End: 2025-06-26
Attending: ORTHOPAEDIC SURGERY
Payer: OTHER MISCELLANEOUS

## 2025-06-30 ENCOUNTER — ANESTHESIA (OUTPATIENT)
Age: 60
End: 2025-06-30
Payer: OTHER MISCELLANEOUS

## 2025-06-30 ENCOUNTER — APPOINTMENT (OUTPATIENT)
Age: 60
End: 2025-06-30
Payer: OTHER MISCELLANEOUS

## 2025-06-30 ENCOUNTER — HOSPITAL ENCOUNTER (OUTPATIENT)
Age: 60
Setting detail: OUTPATIENT SURGERY
Discharge: HOME | End: 2025-06-30
Attending: ORTHOPAEDIC SURGERY | Admitting: ORTHOPAEDIC SURGERY
Payer: OTHER MISCELLANEOUS

## 2025-06-30 VITALS
BODY MASS INDEX: 27.77 KG/M2 | OXYGEN SATURATION: 95 % | HEART RATE: 73 BPM | WEIGHT: 205 LBS | SYSTOLIC BLOOD PRESSURE: 115 MMHG | HEIGHT: 72 IN | DIASTOLIC BLOOD PRESSURE: 75 MMHG | RESPIRATION RATE: 18 BRPM | TEMPERATURE: 97.6 F

## 2025-06-30 DIAGNOSIS — M18.12 ARTHRITIS OF CARPOMETACARPAL (CMC) JOINT OF LEFT THUMB: Primary | ICD-10-CM

## 2025-06-30 DIAGNOSIS — Z47.89 AFTERCARE FOLLOWING SURGERY OF THE MUSCULOSKELETAL SYSTEM: ICD-10-CM

## 2025-06-30 PROCEDURE — C1713 ANCHOR/SCREW BN/BN,TIS/BN: HCPCS | Performed by: ORTHOPAEDIC SURGERY

## 2025-06-30 PROCEDURE — 73140 X-RAY EXAM OF FINGER(S): CPT

## 2025-06-30 PROCEDURE — NC001 PR NO CHARGE: Performed by: ORTHOPAEDIC SURGERY

## 2025-06-30 PROCEDURE — 82948 REAGENT STRIP/BLOOD GLUCOSE: CPT

## 2025-06-30 PROCEDURE — 25448 ARTHRP NTRCRPL/CRP/MTCRP SSP: CPT | Performed by: ORTHOPAEDIC SURGERY

## 2025-06-30 DEVICE — IMPL SYS,CMC MINI T-ROPE,1.1 MM
Type: IMPLANTABLE DEVICE | Site: FINGER | Status: FUNCTIONAL
Brand: ARTHREX®

## 2025-06-30 RX ORDER — ONDANSETRON 2 MG/ML
4 INJECTION INTRAMUSCULAR; INTRAVENOUS ONCE AS NEEDED
Status: CANCELLED | OUTPATIENT
Start: 2025-06-30

## 2025-06-30 RX ORDER — FENTANYL CITRATE 50 UG/ML
INJECTION, SOLUTION INTRAMUSCULAR; INTRAVENOUS
Status: COMPLETED | OUTPATIENT
Start: 2025-06-30 | End: 2025-06-30

## 2025-06-30 RX ORDER — OXYCODONE HYDROCHLORIDE 5 MG/1
5 TABLET ORAL EVERY 6 HOURS PRN
Qty: 15 TABLET | Refills: 0 | Status: SHIPPED | OUTPATIENT
Start: 2025-06-30

## 2025-06-30 RX ORDER — ACETAMINOPHEN 325 MG/1
975 TABLET ORAL ONCE
Status: COMPLETED | OUTPATIENT
Start: 2025-06-30 | End: 2025-06-30

## 2025-06-30 RX ORDER — MIDAZOLAM HYDROCHLORIDE 2 MG/2ML
INJECTION, SOLUTION INTRAMUSCULAR; INTRAVENOUS
Status: COMPLETED | OUTPATIENT
Start: 2025-06-30 | End: 2025-06-30

## 2025-06-30 RX ORDER — PROPOFOL 10 MG/ML
INJECTION, EMULSION INTRAVENOUS AS NEEDED
Status: DISCONTINUED | OUTPATIENT
Start: 2025-06-30 | End: 2025-06-30

## 2025-06-30 RX ORDER — CEFAZOLIN SODIUM 2 G/50ML
2000 SOLUTION INTRAVENOUS ONCE
Status: COMPLETED | OUTPATIENT
Start: 2025-06-30 | End: 2025-06-30

## 2025-06-30 RX ORDER — PROMETHAZINE HYDROCHLORIDE 25 MG/ML
25 INJECTION, SOLUTION INTRAMUSCULAR; INTRAVENOUS ONCE AS NEEDED
Status: CANCELLED | OUTPATIENT
Start: 2025-06-30

## 2025-06-30 RX ORDER — BUPIVACAINE HYDROCHLORIDE 5 MG/ML
INJECTION, SOLUTION EPIDURAL; INTRACAUDAL; PERINEURAL
Status: COMPLETED | OUTPATIENT
Start: 2025-06-30 | End: 2025-06-30

## 2025-06-30 RX ORDER — IBUPROFEN 600 MG/1
600 TABLET, FILM COATED ORAL EVERY 6 HOURS SCHEDULED
Qty: 40 TABLET | Refills: 0 | Status: CANCELLED | OUTPATIENT
Start: 2025-06-30 | End: 2025-07-10

## 2025-06-30 RX ORDER — MAGNESIUM HYDROXIDE 1200 MG/15ML
LIQUID ORAL AS NEEDED
Status: DISCONTINUED | OUTPATIENT
Start: 2025-06-30 | End: 2025-06-30 | Stop reason: HOSPADM

## 2025-06-30 RX ORDER — SODIUM CHLORIDE, SODIUM LACTATE, POTASSIUM CHLORIDE, CALCIUM CHLORIDE 600; 310; 30; 20 MG/100ML; MG/100ML; MG/100ML; MG/100ML
20 INJECTION, SOLUTION INTRAVENOUS CONTINUOUS
Status: DISCONTINUED | OUTPATIENT
Start: 2025-06-30 | End: 2025-06-30 | Stop reason: HOSPADM

## 2025-06-30 RX ORDER — LIDOCAINE HYDROCHLORIDE 10 MG/ML
INJECTION, SOLUTION EPIDURAL; INFILTRATION; INTRACAUDAL; PERINEURAL AS NEEDED
Status: DISCONTINUED | OUTPATIENT
Start: 2025-06-30 | End: 2025-06-30

## 2025-06-30 RX ORDER — ALBUTEROL SULFATE 0.83 MG/ML
2.5 SOLUTION RESPIRATORY (INHALATION) ONCE AS NEEDED
Status: CANCELLED | OUTPATIENT
Start: 2025-06-30

## 2025-06-30 RX ORDER — DOCUSATE SODIUM 100 MG/1
100 CAPSULE, LIQUID FILLED ORAL DAILY PRN
Qty: 10 CAPSULE | Refills: 0 | Status: SHIPPED | OUTPATIENT
Start: 2025-06-30

## 2025-06-30 RX ORDER — ONDANSETRON 2 MG/ML
INJECTION INTRAMUSCULAR; INTRAVENOUS AS NEEDED
Status: DISCONTINUED | OUTPATIENT
Start: 2025-06-30 | End: 2025-06-30

## 2025-06-30 RX ORDER — ACETAMINOPHEN 500 MG
1000 TABLET ORAL EVERY 8 HOURS
Qty: 60 TABLET | Refills: 0 | Status: SHIPPED | OUTPATIENT
Start: 2025-06-30

## 2025-06-30 RX ORDER — FENTANYL CITRATE/PF 50 MCG/ML
25 SYRINGE (ML) INJECTION
Refills: 0 | Status: CANCELLED | OUTPATIENT
Start: 2025-06-30

## 2025-06-30 RX ORDER — PROPOFOL 10 MG/ML
INJECTION, EMULSION INTRAVENOUS CONTINUOUS PRN
Status: DISCONTINUED | OUTPATIENT
Start: 2025-06-30 | End: 2025-06-30

## 2025-06-30 RX ADMIN — LIDOCAINE HYDROCHLORIDE 20 MG: 10 INJECTION, SOLUTION EPIDURAL; INFILTRATION; INTRACAUDAL; PERINEURAL at 09:49

## 2025-06-30 RX ADMIN — BUPIVACAINE HYDROCHLORIDE 20 ML: 5 INJECTION, SOLUTION EPIDURAL; INTRACAUDAL; PERINEURAL at 09:45

## 2025-06-30 RX ADMIN — ONDANSETRON 4 MG: 2 INJECTION INTRAMUSCULAR; INTRAVENOUS at 11:08

## 2025-06-30 RX ADMIN — PROPOFOL 100 MCG/KG/MIN: 10 INJECTION, EMULSION INTRAVENOUS at 09:49

## 2025-06-30 RX ADMIN — PROPOFOL 20 MG: 10 INJECTION, EMULSION INTRAVENOUS at 11:24

## 2025-06-30 RX ADMIN — PROPOFOL 20 MG: 10 INJECTION, EMULSION INTRAVENOUS at 10:09

## 2025-06-30 RX ADMIN — SODIUM CHLORIDE, SODIUM LACTATE, POTASSIUM CHLORIDE, AND CALCIUM CHLORIDE 20 ML/HR: .6; .31; .03; .02 INJECTION, SOLUTION INTRAVENOUS at 08:31

## 2025-06-30 RX ADMIN — BUPIVACAINE 10 ML: 13.3 INJECTION, SUSPENSION, LIPOSOMAL INFILTRATION at 09:45

## 2025-06-30 RX ADMIN — PROPOFOL 30 MG: 10 INJECTION, EMULSION INTRAVENOUS at 11:21

## 2025-06-30 RX ADMIN — MIDAZOLAM 2 MG: 1 INJECTION INTRAMUSCULAR; INTRAVENOUS at 09:35

## 2025-06-30 RX ADMIN — CEFAZOLIN SODIUM 2000 MG: 2 SOLUTION INTRAVENOUS at 09:54

## 2025-06-30 RX ADMIN — PROPOFOL 50 MG: 10 INJECTION, EMULSION INTRAVENOUS at 09:49

## 2025-06-30 RX ADMIN — ACETAMINOPHEN 975 MG: 325 TABLET ORAL at 08:31

## 2025-06-30 RX ADMIN — FENTANYL CITRATE 100 MCG: 50 INJECTION INTRAMUSCULAR; INTRAVENOUS at 09:40

## 2025-06-30 NOTE — ANESTHESIA POSTPROCEDURE EVALUATION
Post-Op Assessment Note    CV Status:  Stable  Pain Score: 0    Pain management: adequate       Mental Status:  Sleepy   Hydration Status:  Stable   PONV Controlled:  None   Airway Patency:  Patent  Two or more mitigation strategies used for obstructive sleep apnea   Post Op Vitals Reviewed: Yes    No anethesia notable event occurred.    Staff: CRNA           Last Filed PACU Vitals:  Vitals Value Taken Time   Temp 97.1F    Pulse 66 06/30/25 11:34   /60 06/30/25 11:32   Resp 8 06/30/25 11:34   SpO2 99 % 6L FM with OPA 06/30/25 11:34   Vitals shown include unfiled device data.

## 2025-06-30 NOTE — OP NOTE
OPERATIVE REPORT  PATIENT NAME: Fidel Calderon    :  1965  MRN: [id number]  Pt Location:  OR ROOM 06    SURGERY DATE: 2025    Surgeons and Role:     * Papo Castillo MD - Primary     * Tato Pulliam MD - Assisting     * Genevieve Hoover PA-C - Observing    Preop Diagnosis:  Arthritis of carpometacarpal (CMC) joint of left thumb [M18.12]    Post-Op Diagnosis Codes:     * Arthritis of carpometacarpal (CMC) joint of left thumb [M18.12]    Procedure(s):  Left - LEFT THUMB CARPOMETACARPAL JOINT ARTHROPLASTY WITH TIGHTROPE SUSPENSION - LEFT    Specimen(s):  * No specimens in log *    Estimated Blood Loss:   Minimal    Drains:  * No LDAs found *    Anesthesia Type:   Regional with Sedation    Operative Indications:  Arthritis of carpometacarpal (CMC) joint of left thumb [M18.12]    Continued pain due to the above diagnosis which has failed appropriate nonoperative management for greater than 1 year.  His symptoms are affecting his quality of life and he elected to proceed with surgery.    Operative Findings:  Arthritic changes of the thumb CMC joint with osteophyte formation       Complications:   None    Procedure and Technique:  On the day of surgery, the patient was met in the pre-operative area. Once again the risks benefits and alternatives of thumb CMC arthroplasty with tight rope were discussed with the patient. Risks included pain, stiffness, swelling, injury to neurovascular to musculoskeletal structure, need for reoperation, thromboembolic event and death. Specific to this procedure the risk of nerve injury and fracture was discussed and the potential treatment options. Benefits included removal of arthritic joint, decreased pain, and overall improved function.     Patient underwent anesthetic block. Patient was brought to the OR. A tourniquet was applied. The operative extremity was prepped and draped in a standard fashion.  2 grams of Ancef were administered for preoperative  antibiotic prophylaxis.  A timeout was performed prior to incision identifying the name and laterality of the procedure which was corroborated with imaging that was present in the room.     The arm was exsanguinated and tourniquet inflated. A 3cm incision was made in longitudinal fashion over there first extensor compartment. The superficial radial nerves were identified and protected. The first dorsal compartment was incised and tendons retracted. The radial artery was identified and carefully mobilized and gently retracted. The CMC joint was incised and capsular flaps were raised dorsally and volarly. The scaphoitrapezial and trapeziometacarpal joint were identified. Soft tissue was detached from the trapezium sharply and with the use of a scalpel and right angle clamp.     We then turned out attention to the second metacarpal. 1 cm incision was made along the ulnar aspect of the metacarpal base. The extensor tendon was retracted, and a 1cm area of periosteum was elevated.     The long guidewire was then positioned on the thumb metacarpal and angled towards the second metacarpal.  Fluoroscopy was used to confirm proper postioning. The guidewire for the tightrope suture was then driven across the 4 cortices of the first and second metacarpals. The positioning of the guidewire was confirmed with fluoroscopy.     The tightrope suture was pulled through the guidewire trajectory. One button remained flush against the thumb metacarpal.     The trapezium was then removed with a rongeur and scissors. The scaphotrapezoid joint was inspected and noted to be free of arthritis. The FCR tendon was intact.     The thumb was held in the appropriate length, abduction and extension and the tightrope suture was tied over the second button on the second metacarpal. Once again positioning was confirmed on fluoroscopy.     Final fluoroscopy was taken to confirm removal of trapezium. A stress radiograph demonstrated no loss of height  of the thumb metacarpal. There was no thumb metacarpophalangeal joint hyperextension.      Tourniquet was taken down. The wounds were irrigated. The radial incision was closed in a layered fashion with vicryl for the capsule and nylon for the skin. The second metacarpal incision was closed with nylon suture.     Sterile dressings were placed and a thumb spica splint was applied.     Post-operatively the patient will remain non weight bearing in the splint until the follow up appointment with therapy in 4-5 days.     I was present for the entire procedure.    Patient Disposition:  PACU          SIGNATURE: Papo Castillo MD  DATE: June 30, 2025  TIME: 11:28 AM

## 2025-06-30 NOTE — ANESTHESIA POSTPROCEDURE EVALUATION
Post-Op Assessment Note    CV Status:  Stable    Pain management: adequate       Mental Status:  Alert and awake   Hydration Status:  Euvolemic   PONV Controlled:  Controlled   Airway Patency:  Patent  Two or more mitigation strategies used for obstructive sleep apnea   Post Op Vitals Reviewed: Yes    No anethesia notable event occurred.    Staff: Anesthesiologist           Last Filed PACU Vitals:  Vitals Value Taken Time   Temp     Pulse 69 06/30/25 12:04   /66 06/30/25 12:00   Resp 13 06/30/25 12:04   SpO2 96 % 06/30/25 12:04   Vitals shown include unfiled device data.    Modified Andrea:     Vitals Value Taken Time   Activity 2 06/30/25 12:00   Respiration 2 06/30/25 12:00   Circulation 1 06/30/25 12:00   Consciousness 1 06/30/25 12:00   Oxygen Saturation 1 06/30/25 12:00     Modified Andrea Score: 7

## 2025-06-30 NOTE — INTERVAL H&P NOTE
H&P reviewed. After examining the patient I find no changes in the patients condition since the H&P had been written.    Vitals:    06/30/25 0821   BP: 133/90   Pulse: 66   Resp: 13   Temp: (!) 97.4 °F (36.3 °C)   SpO2: 95%

## 2025-06-30 NOTE — H&P
HAND & UPPER EXTREMITY OFFICE VISIT   Referred By:  No referring provider defined for this encounter.      Chief Complaint:     Left hand pain    Surgery:  Surgery Date: 1/13/2025 - Right thumb carpometacarpal joint arthroplasty with TightRope suspension - Right, RELEASE CARPAL TUNNEL - Right - Right, and RELEASE CUBITAL TUNNEL - Right - Right     10/21/24 - Release Carpal Tunnel - Left - Left and Release Cubital Tunnel - Left, With Submuscular Transposition - Left     History of Present Illness:   Patient presents now 4 months status post the right thumb CMC arthroplasty with carpal and cubital tunnel release. He is also 6 months s/p left carpal and cubital tunnel release. he reports doing better since the last visit. His swelling and strength has been improving. He has been adapting his activities and is now able to turn a key in the ignition which was much more difficult before. He still has intermittent cramping and episodes of pain with certain activities such as pushing down and forceful gripping with the thumb.     For the left hand, he reports continued pain in the base of the thumb. He is interested in proceeding with surgery for the left thumb     Past Medical History:  Past Medical History:   Diagnosis Date    Cardiac disease     Diabetes mellitus (HCC)     Hyperlipidemia     Hypertension     MI (myocardial infarction) (HCC)     Myocardial infarction (HCC)     Sleep apnea      Past Surgical History:   Procedure Laterality Date    CARDIAC CATHETERIZATION N/A 03/06/2023    Procedure: Cardiac catheterization;  Surgeon: Fernando Panda MD;  Location: [facility];  Service: Cardiology    CARDIAC CATHETERIZATION N/A 03/06/2023    Procedure: Cardiac pci;  Surgeon: Fernando Panda MD;  Location: [facility];  Service: Cardiology    CARDIAC CATHETERIZATION N/A 03/06/2023    Procedure: Cardiac Coronary Angiogram;  Surgeon: Fernando Panda MD;  Location: [facility];  Service: Cardiology     CARDIAC CATHETERIZATION Left 03/18/2024    Procedure: Cardiac catheterization;  Surgeon: Bob Fraser MD;  Location: [facility];  Service: Cardiology    CARDIAC CATHETERIZATION N/A 03/18/2024    Procedure: Cardiac pci;  Surgeon: Bob Fraser MD;  Location: [facility];  Service: Cardiology    CARDIAC SURGERY      COLONOSCOPY      CORONARY ANGIOPLASTY WITH STENT PLACEMENT      DEBRIDEMENT TENNIS ELBOW      HERNIA REPAIR      KNEE ARTHROSCOPY      OK ARTHRP INTERCARPAL/CARP/MTCRPL JT INTERPOSITION Right 1/13/2025    Procedure: Right thumb carpometacarpal joint arthroplasty with TightRope suspension;  Surgeon: Papo Castillo MD;  Location: [facility];  Service: Orthopedics    OK NEUROPLASTY &/TRANSPOS MEDIAN NRV CARPAL TUNNE Left 10/21/2024    Procedure: RELEASE CARPAL TUNNEL - LEFT;  Surgeon: Papo Castillo MD;  Location: [facility];  Service: Orthopedics    OK NEUROPLASTY &/TRANSPOS MEDIAN NRV CARPAL TUNNE Right 1/13/2025    Procedure: RELEASE CARPAL TUNNEL - Right;  Surgeon: Papo Castillo MD;  [facility];  Service: Orthopedics    OK NEUROPLASTY &/TRANSPOSITION ULNAR NERVE ELBOW Left 10/21/2024    Procedure: RELEASE CUBITAL TUNNEL - LEFT, WITH SUBMUSCULAR TRANSPOSITION;  Surgeon: Papo Castillo MD;  [facility];  Service: Orthopedics    OK NEUROPLASTY &/TRANSPOSITION ULNAR NERVE ELBOW Right 1/13/2025    Procedure: RELEASE CUBITAL TUNNEL - Right;  Surgeon: Papo Castillo MD;  [facility];  Service: Orthopedics    SHOULDER ARTHROSCOPY      VASECTOMY       Family History   Problem Relation Age of Onset    Hypertension Maternal Grandfather     Diabetes type I Maternal Grandmother     Hypertension Paternal Grandfather     Diabetes type II Paternal Grandmother      Social History     Socioeconomic History    Marital status: /Civil Union     Spouse name: Not on file    Number of children: Not on file    Years of education:  Not on file    Highest education level: Not on file   Occupational History    Not on file   Tobacco Use    Smoking status: Former     Current packs/day: 0.00     Average packs/day: 0.5 packs/day for 28.0 years (14.0 ttl pk-yrs)     Types: Cigarettes     Start date: 1977     Quit date: 2005     Years since quittin.3    Smokeless tobacco: Former     Types: Chew     Quit date: 1985   Vaping Use    Vaping status: Never Used   Substance and Sexual Activity    Alcohol use: Yes     Alcohol/week: 2.0 standard drinks of alcohol     Types: 2 Shots of liquor per week     Comment: 2 drinks weekly    Drug use: No    Sexual activity: Yes   Other Topics Concern    Not on file   Social History Narrative    Not on file     Social Drivers of Health     Financial Resource Strain: Not on file   Food Insecurity: Not on file   Transportation Needs: Not on file   Physical Activity: Not on file   Stress: Not on file   Social Connections: Not on file   Intimate Partner Violence: Not on file   Housing Stability: Not on file     Scheduled Meds:  Continuous Infusions:No current facility-administered medications for this visit.    PRN Meds:.  Allergies   Allergen Reactions    Banana - Food Allergy Throat Swelling    Tetanus Toxoid Other (See Comments)       Physical Examination:    Ht 6' (1.829 m)   Wt 92 kg (202 lb 12.8 oz)   BMI 27.50 kg/m²     Gen: A&Ox3, NAD    Right Upper Extremity:  Incisions well-healed without signs of infection   Minimal residual swelling of thumb and hand  Sensation intact to light touch in the axillary median, ulnar, and radial nerve distributions  Able to form composite fist and oppose thumb to small finger middle phalanx  Warm, well-perfused digits  Cap refill <2s      Left Upper Extremity:   Incision well-healed without signs of infection.  Non-tender around incision sites  Sensation intact to light touch in the axillary median, ulnar, and radial nerve distributions  Full digital and elbow  ROM  Warm, well-perfused digits  Cap refill <2s      Studies:  Radiographs: I personally reviewed and independently interpreted the available radiographs.  5/7/25: Radiographs of the right hand, multiple views, demonstrate stable surgical changes s/p thumb CMC arthroplasty. No evidence of hardware failure or subsidence.     Assessment & Plan  S/P musculoskeletal system surgery  59 y.o. male presents 4 months status post Right thumb carpometacarpal joint arthroplasty with TightRope suspension - Right, RELEASE CARPAL TUNNEL - Right - Right, and RELEASE CUBITAL TUNNEL - Right - Right, and 6 months s/p left carpal and cubital tunnel release. He reports overall making good progress since the last visit. Encouraged patient to continue working with PT/OT and progressing his ROM and strengthening as tolerated with the right hand. Updated work note provided to return with light duty restrictions.     Orders:    XR hand 3+ vw right; Future    Arthritis of carpometacarpal (CMC) joint of left thumb  He continues to have pain in the left thumb and and would like to proceed with surgery for his left side. We reviewed the risks of surgery including infection, bleeding, injury to nearby structures including the nerve, poor wound healing, pillar pain, stiffness, CRPS, and incomplete resolution or recurrence of symptoms. We reviewed the details of the procedure and the anticipated recovery period. All questions answered to patient's satisfaction. Written consent obtained in the office today.     Left thumb CMC arthroplasty with tightrope suspension  Regional with sedation    Orders:    Case request operating room: LEFT THUMB CARPOMETACARPAL JOINT ARTHROPLASTY WITH TIGHTROPE SUSPENSION - LEFT; Standing    Ambulatory Referral to PT/OT Hand Therapy; Future        he expressed understanding of the plan and agreed. We encouraged them to contact our office with any questions or concerns.         Papo Castillo MD  Hand and Upper  Extremity Surgery          *This note was dictated using Dragon voice recognition software. Please excuse any word substitutions or errors.*      Scribe Attestation      I,:  Genevieve Hoover PA-C am acting as a scribe while in the presence of the attending physician.:       I,:  Papo Castillo MD personally performed the services described in this documentation    as scribed in my presence.:

## 2025-06-30 NOTE — DISCHARGE INSTR - AVS FIRST PAGE
POST-OPERATIVE INSTRUCTIONS   THUMB CMC ARTHROPLASTY    You have just undergone a thumb carpometacarpal arthroplasty by Dr. Castillo in the operating room. It is our wish that your postoperative recovery be as quick and comfortable as possible.  Please carefully review the following items that are important in your recovery.    Pain Control:  After any operation, a certain degree of pain is to be expected.  You have been given a prescription for pain medicine, as well as acetaminophen and/or ibuprofen which will relieve most of your pain but may not relieve all of the pain.  Pain medicine may make you drowsy so please curtail your activities appropriately.  Do not drive while taking pain medicine.     When you go home, please keep your operated arm elevated at all times (above the level of your heart.)  If you do this, your swelling will be diminished and your pain will be diminished as well.    You had a nerve block as part of your surgical anesthesia as well as to aid in your post-operative pain control. This nerve block may last 12-36 hrs after surgery. While your block is working, you will not be able to feel or move your operative arm and hand. Please wear your sling while the block is in place, except for changing clothes or hygiene purposes, to protect your arm. As the block wears off you will start to notice pain in your operative extremity. Please take pain medication as soon as you start to notice the block wearing off. This prevents your pain from becoming unmanageable when the block has completely worn off.      Dressing Care:  The splint that you have on your extremity should remain on and intact until your first postoperative visit with hand therapy.  After surgery, make sure that your dressing is kept dry.  You can take a shower if you cover your arm with a plastic bag, such as a newspaper bag, and use tape or rubber bands. If your dressing gets wet, take it off,  place Band-Aids on the wound and  re-wrap it with sterile dressing that you can get at a local drug store, then please call the office to have a new splint placed.    Weight Bearing:  You should NOT bear weight through your operative extremity. Do not push off using your operative extremity.     If your fingers are not included in the splint, it is ok to move your free fingers as tolerated to prevent stiffness. You may also use your free fingers to assist with light activities of daily living such as putting on clothes, brushing your teeth and eating.       Follow up:  If you do not already have one, please call our office for a follow-up appointment. It is best to call the day after surgery to make an appointment in 10-14 days.  At your first postoperative visit, you will be seen by either Dr. Castillo, his PA;  or one of their associates and the sutures will be removed     You should already have an appointment to see a hand therapist to optimize your functional result. If you do not already have an appointment with the hand therapist, please call ASAP to schedule your post-operative appointment. You will see the hand therapist ~5-7 days after surgery, before you follow up with Dr. Castillo's office. Each of the hand therapists that you may be referred to have received special training in the care of the hand and upper extremity ailments.    [facility]: [phone]    When to Call:  It is normal to see minor staining on the hand surgery dressing after surgery. If there is significant bleeding, you are advised to call the office during regular office hours to have this checked.     If you feel that you have a surgical emergency postoperatively that requires immediate attention, please call [phone]. In addition, any emergency can also be handled by the emergency room.      If you have questions regarding your surgery postop that you feel requires attention, please call the office.   If this occurs after our regular office hours, there is a message  with instructions to talk to the physician on call.

## 2025-06-30 NOTE — ANESTHESIA PROCEDURE NOTES
Peripheral Block    Patient location during procedure: holding area  Start time: 6/30/2025 9:30 AM  Reason for block: at surgeon's request and post-op pain management  Staffing  Performed by: Serjio Awan MD  Authorized by: Serjio Awan MD    Preanesthetic Checklist  Completed: patient identified, IV checked, site marked, risks and benefits discussed, surgical consent, monitors and equipment checked, pre-op evaluation and timeout performed  Peripheral Block  Patient position: sitting  Prep: ChloraPrep  Patient monitoring: frequent blood pressure checks, continuous pulse oximetry and heart rate  Block type: Supraclavicular  Laterality: left  Injection technique: single-shot  Procedures: ultrasound guided, Ultrasound guidance required for the procedure to increase accuracy and safety of medication placement and decrease risk of complications.  Ultrasound permanent image saved  bupivacaine (PF) (MARCAINE) 0.5 % injection 20 mL - Perineural   20 mL - 6/30/2025 9:45:00 AM  bupivacaine liposomal (EXPAREL) 1.3 % injection 20 mL - Perineural   10 mL - 6/30/2025 9:45:00 AM  midazolam (VERSED) injection 0.5 mg - Intravenous   2 mg - 6/30/2025 9:35:00 AM  fentanyl citrate (PF) 100 MCG/2ML 50 mcg - Intravenous   100 mcg - 6/30/2025 9:40:00 AM  Needle  Needle type: Stimuplex   Needle gauge: 22 G  Needle length: 2 in  Needle localization: anatomical landmarks and ultrasound guidance  Assessment  Injection assessment: incremental injection, frequent aspiration, injected with ease, negative aspiration, negative for heart rate change, no paresthesia on injection, no symptoms of intraneural/intravenous injection and needle tip visualized at all times  Paresthesia pain: none  Post-procedure:  site cleaned  patient tolerated the procedure well with no immediate complications

## 2025-06-30 NOTE — ANESTHESIA PREPROCEDURE EVALUATION
Procedure:  LEFT THUMB CARPOMETACARPAL JOINT ARTHROPLASTY WITH TIGHTROPE SUSPENSION - LEFT (Left: Finger)    Had similar surgery on contralateral side about 6 months ago under regional anesthesia with no complications     Multiple cardiac stents, last one about 2 years ago. 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, greater than 8 weeks   (+) Primary hypertension      ENDO   (+) Type 2 diabetes mellitus with circulatory disorder, without long-term current use of insulin (HCC)      MUSCULOSKELETAL   (+) Arthritis of carpometacarpal (CMC) joint of left thumb   (+) Arthritis of carpometacarpal (CMC) joint of right thumb        Physical Exam    Airway     Mallampati score: II  TM Distance: >3 FB  Neck ROM: full  Mouth opening: >= 4 cm      Cardiovascular  Cardiovascular exam normal    Dental   No notable dental hx     Pulmonary  Pulmonary exam normal     Neurological  - normal exam    Other Findings  post-pubertal.      Anesthesia Plan  ASA Score- 3     Anesthesia Type- IV sedation with anesthesia and regional with ASA Monitors.         Additional Monitors:     Airway Plan: natural airway.           Plan Factors-Exercise tolerance (METS): >4 METS.    Chart reviewed. EKG reviewed.  Existing labs reviewed. Patient summary reviewed.    Patient is not a current smoker.      Obstructive sleep apnea risk education given perioperatively.        Induction-     Postoperative Plan- .   Monitoring Plan - Monitoring plan - standard ASA monitoring  Post Operative Pain Plan - peripheral nerve block and multimodal analgesia        Informed Consent- Anesthetic plan and risks discussed with patient.  I personally reviewed this patient with the CRNA. Discussed and agreed on the Anesthesia Plan with the CRNA..      NPO Status:  Vitals Value Taken Time   Date of last liquid  06/29/25 06/30/25 08:23   Time of last liquid 2345 06/30/25 08:23   Date of last solid 06/29/25 06/30/25 08:23   Time of last solid 2345 06/30/25 08:23

## 2025-07-01 LAB — GLUCOSE SERPL-MCNC: 146 MG/DL (ref 65–140)

## 2025-07-03 NOTE — PROGRESS NOTES
PT Evaluation     Today's date: 2025  Patient name: Fidel Calderon  : 1965  MRN: [id number]  Referring provider: Papo Castillo,*  Dx:   Encounter Diagnosis     ICD-10-CM    1. Arthritis of carpometacarpal (CMC) joint of left thumb  M18.12 Ambulatory referral to PT/OT hand therapy      2. Aftercare following surgery of the musculoskeletal system  Z47.89 Ambulatory referral to PT/OT hand therapy                       Assessment    Assessment details: Pt is a 58 YO male presenting to PT with pain, decreased AROM, strength and tolerance to activity.  Pt would benefit from skilled intervention to address these issues and maximize overall function.  Occupation- [employer]- currently off  Dominant- left ; Involved- left thumb       Goals  ST.  Decrease pain to 0-2/10 in 4-8 weeks to ease ADL and self care            2.  Decrease swelling 0.5 cm in 4-8 weeks            3.  Increase AROM 10-20 degrees in 4 weeks for improved ability to bathe, dress, and assist in functional activity            4.  Provide orthotic for protection, compression for support            5.  Instruct in activity modification for ADL and self care with independence in 4-6 weeks            6.  Instruct in HEP   LT.  Increase functional AROM to assist with independence in 8-12 weeks            2.  [place] with HEP in 4-6 weeks            3.  Increase  strength by 2-5 lbs in 8 weeks            4. Recreational activities are improved to maximum level in 12 weeks.            5.  ADL performance is improved to maximal level of function in 12 weeks.            6. Ability to RTW by DC        Plan  Patient would benefit from: skilled physical therapy  Planned modality interventions: cryotherapy, thermotherapy: hydrocollator packs and ultrasound    Planned therapy interventions: activity modification, manual therapy, strengthening, stretching, therapeutic activities, therapeutic exercise and home exercise  program    Frequency: 2x week  Duration in weeks: 4  Treatment plan discussed with: patient      Subjective Evaluation    History of Present Illness  Date of surgery: 2025  Mechanism of injury: Pt with persistent pain in his left thumb, recent CMC arthroplasty on his right thumb.   Dr. Castillo completed  LEFT THUMB CARPOMETACARPAL JOINT ARTHROPLASTY WITH TIGHTROPE SUSPENSION - LEFT    Patient Goals  Patient goals for therapy: decreased edema, increased motion, decreased pain, increased strength, independence with ADLs/IADLs, return to sport/leisure activities and return to work    Pain  Current pain ratin  At best pain ratin  At worst pain ratin  Quality: dull ache    Hand dominance: left    Treatments  Current treatment: physical therapy      Objective     General Comments:      Wrist/Hand Comments  AROM left FA S/P- 70/70; wrist E/F- 30/15                      Thumb MP- 0/15; IP- 0/25                      Digits- MP- 25/45; PIP- 30/60; DIP- 0/40  Inspection- post op dressings removed and replaced with FA based thumb spica for night and HB spica for daytime.  Pt instructed to wear at all times except therapy and hygiene, protecting sutures from getting wet.   Circumference at left wrist- 21.0 cm; Mps- 23.0 cm; thumb P1- 8.0 cm; MF P1- 8.2 cm  Sensation- pt notes occasional tingling in digits             Precautions: wear orthosis as directed      Manuals             STM             liners 4            WHFO 1-L            HFO 1-L                         HP/pulsed biph 15                                      Ther Ex             Wrist jogs 2/5            T MP/IP 2/5            TGE -->                                                                                                                                                           Modalities                          CP

## 2025-07-07 ENCOUNTER — EVALUATION (OUTPATIENT)
Facility: CLINIC | Age: 60
End: 2025-07-07
Payer: OTHER MISCELLANEOUS

## 2025-07-07 DIAGNOSIS — Z47.89 AFTERCARE FOLLOWING SURGERY OF THE MUSCULOSKELETAL SYSTEM: ICD-10-CM

## 2025-07-07 DIAGNOSIS — M18.12 ARTHRITIS OF CARPOMETACARPAL (CMC) JOINT OF LEFT THUMB: ICD-10-CM

## 2025-07-07 PROCEDURE — 97162 PT EVAL MOD COMPLEX 30 MIN: CPT | Performed by: PHYSICAL THERAPIST

## 2025-07-07 PROCEDURE — L3913 HFO W/O JOINTS CF: HCPCS | Performed by: PHYSICAL THERAPIST

## 2025-07-07 PROCEDURE — L3808 WHFO, RIGID W/O JOINTS: HCPCS | Performed by: PHYSICAL THERAPIST

## 2025-07-07 PROCEDURE — 97110 THERAPEUTIC EXERCISES: CPT | Performed by: PHYSICAL THERAPIST

## 2025-07-09 ENCOUNTER — OFFICE VISIT (OUTPATIENT)
Facility: CLINIC | Age: 60
End: 2025-07-09
Payer: OTHER MISCELLANEOUS

## 2025-07-09 DIAGNOSIS — Z47.89 AFTERCARE FOLLOWING SURGERY OF THE MUSCULOSKELETAL SYSTEM: ICD-10-CM

## 2025-07-09 DIAGNOSIS — M18.12 ARTHRITIS OF CARPOMETACARPAL (CMC) JOINT OF LEFT THUMB: Primary | ICD-10-CM

## 2025-07-09 PROCEDURE — 97112 NEUROMUSCULAR REEDUCATION: CPT | Performed by: PHYSICAL THERAPIST

## 2025-07-09 PROCEDURE — 97140 MANUAL THERAPY 1/> REGIONS: CPT | Performed by: PHYSICAL THERAPIST

## 2025-07-09 PROCEDURE — 97110 THERAPEUTIC EXERCISES: CPT | Performed by: PHYSICAL THERAPIST

## 2025-07-09 NOTE — PROGRESS NOTES
Daily Note     Today's date: 2025  Patient name: Fidel Calderon  : 1965  MRN: [id number]  Referring provider: Papo Castillo,*  Dx:   Encounter Diagnosis     ICD-10-CM    1. Arthritis of carpometacarpal (CMC) joint of left thumb  M18.12       2. Aftercare following surgery of the musculoskeletal system  Z47.89                      Subjective: pt wearing orthoses as prescribed. Moderate soreness noted      Objective: See treatment diary below    AROM left FA S/P- 70/70; wrist E/F- 30/15                      Thumb MP- 0/15; IP- 0/25                      Digits- MP- 25/45; PIP- 30/60; DIP- 0/40  Inspection- post op dressings removed and replaced with FA based thumb spica for night and HB spica for daytime.  Pt instructed to wear at all times except therapy and hygiene, protecting sutures from getting wet.   Circumference at left wrist- 21.0 cm; Mps- 23.0 cm; thumb P1- 8.0 cm; MF P1- 8.2 cm  Sensation- pt notes occasional tingling in digits     Assessment: Tolerated treatment well. Patient would benefit from continued PT      Plan: Continue per plan of care.      Precautions: wear orthosis as directed      Manuals            STM  15 15 15         liners 4            WHFO 1-L            HFO 1-L                         HP/pulsed biph 15 15 15 15                                   Ther Ex             Wrist jogs /5 2/5 2/5 2/5         T MP/IP 2/ 2/5 2/5 2/5         TGE --> 2/3 2/3 2/3                                                                                                                                                        Modalities                          CP  15

## 2025-07-10 ENCOUNTER — OFFICE VISIT (OUTPATIENT)
Dept: OBGYN CLINIC | Facility: MEDICAL CENTER | Age: 60
End: 2025-07-10

## 2025-07-10 ENCOUNTER — APPOINTMENT (OUTPATIENT)
Dept: RADIOLOGY | Facility: MEDICAL CENTER | Age: 60
End: 2025-07-10
Attending: ORTHOPAEDIC SURGERY
Payer: COMMERCIAL

## 2025-07-10 VITALS — WEIGHT: 205 LBS | HEIGHT: 72 IN | BODY MASS INDEX: 27.77 KG/M2

## 2025-07-10 DIAGNOSIS — M18.12 ARTHRITIS OF CARPOMETACARPAL (CMC) JOINT OF LEFT THUMB: ICD-10-CM

## 2025-07-10 DIAGNOSIS — M18.12 ARTHRITIS OF CARPOMETACARPAL (CMC) JOINT OF LEFT THUMB: Primary | ICD-10-CM

## 2025-07-10 PROCEDURE — 73130 X-RAY EXAM OF HAND: CPT

## 2025-07-10 PROCEDURE — 99024 POSTOP FOLLOW-UP VISIT: CPT | Performed by: ORTHOPAEDIC SURGERY

## 2025-07-10 NOTE — PROGRESS NOTES
HAND & UPPER EXTREMITY OFFICE VISIT   Referred By:  No referring provider defined for this encounter.        Assessment and Plan:  Assessment & Plan  Arthritis of carpometacarpal (CMC) joint of left thumb        Orders:    XR hand 3+ vw left; Future        59 y.o. male presents 10 days status post Left Thumb Carpometacarpal Joint Arthroplasty With Tightrope Suspension - Left - Left.      The patient is currently doing well.  Incision clean dry and intact with sutures removed.  He should continue PT/OT.  Continue brace.  He should not lift greater than 2 lbs with left hand.    It is recommended he return to the office in 4 week, or sooner should symptoms worsen    he expressed understanding of the plan and agreed. We encouraged them to contact our office with any questions or concerns.         Chief Complaint:     Left thumb pain    Surgery:  Surgery Date: 6/30/2025 - Left Thumb Carpometacarpal Joint Arthroplasty With Tightrope Suspension - Left - Left     Surgery Date: 1/13/2025 - Right thumb carpometacarpal joint arthroplasty with TightRope suspension - Right, RELEASE CARPAL TUNNEL - Right - Right, and RELEASE CUBITAL TUNNEL - Right - Right      10/21/24 - Release Carpal Tunnel - Left - Left and Release Cubital Tunnel - Left, With Submuscular Transposition - Left     History of Present Illness:   Patient presents now 10 days status post the above surgery.  He is doing well.  Today he complains of left generalized thenar hand soreness and stiffness and decreased right hand stiffness.  Overall he has better early ROM on this side than on the right. He has been seen at PT/OT including custom splint.  He continues home exercises for right hand.  He denies medications yet has used Tylenol and oxycodone just after surgery.      Past Medical History:  Past Medical History[1]  Past Surgical History[2]  Family History[3]  Social History     Socioeconomic History    Marital status: /Civil Union     Spouse name: Not on  file    Number of children: Not on file    Years of education: Not on file    Highest education level: Not on file   Occupational History    Not on file   Tobacco Use    Smoking status: Former     Current packs/day: 0.00     Average packs/day: 0.5 packs/day for 28.0 years (14.0 ttl pk-yrs)     Types: Cigarettes     Start date: 1977     Quit date: 2005     Years since quittin.5    Smokeless tobacco: Former     Types: Chew     Quit date: 1985   Vaping Use    Vaping status: Never Used   Substance and Sexual Activity    Alcohol use: Yes     Alcohol/week: 2.0 standard drinks of alcohol     Types: 2 Shots of liquor per week     Comment: 2 drinks weekly    Drug use: No    Sexual activity: Yes   Other Topics Concern    Not on file   Social History Narrative    Not on file     Social Drivers of Health     Financial Resource Strain: Not on file   Food Insecurity: No Food Insecurity (2025)    Nursing - Inadequate Food Risk Classification     Worried About Running Out of Food in the Last Year: Not on file     Ran Out of Food in the Last Year: Not on file     Ran Out of Food in the Last Year: Never true   Transportation Needs: No Transportation Needs (2025)    Nursing - Transportation Risk Classification     Lack of Transportation: Not on file     Lack of Transportation: No   Physical Activity: Not on file   Stress: Not on file   Social Connections: Not on file   Intimate Partner Violence: Unknown (2025)    Nursing IPS     Feels Physically and Emotionally Safe: Not on file     Physically Hurt by Someone: Not on file     Humiliated or Emotionally Abused by Someone: Not on file     Physically Hurt by Someone: No     Hurt or Threatened by Someone: No   Housing Stability: Unknown (2025)    Nursing: Inadequate Housing Risk Classification     Has Housing: Not on file     Worried About Losing Housing: Not on file     Unable to Get Utilities: Not on file     Unable to Pay for Housing in the Last  Year: No     Has Housing: No     Scheduled Meds:  Continuous Infusions:No current facility-administered medications for this visit.    PRN Meds:.  Allergies[4]    Physical Examination:    Ht 6' (1.829 m)   Wt 93 kg (205 lb)   BMI 27.80 kg/m²     Gen: A&Ox3, NAD    Right Upper Extremity:  Incisions well healed   Sensation intact to light touch in the axillary median, ulnar, and radial nerve distributions  Motor function grossly intact   Warm, well-perfused digits  Cap refill <2s      Left Upper Extremity:  Dressing clean and dry, removed  Incision healing well without signs of infection   Sutures intact, removed  Mild swelling over thenar hand  Sensation intact to light touch in the axillary median, ulnar, and radial nerve distributions  Motor function grossly intact   Warm, well-perfused digits  Cap refill <2s      Studies:  Radiographs: I personally reviewed and independently interpreted the available radiographs.  7/10/2025: Radiographs of the left hand, multiple views, demonstrate s/p trapeziectomy and TightRope fixation. Stable alignment without subsidence.    Labs:  I personally reviewed the following labs,   Lab Results   Component Value Date    HGBA1C 6.7 (H) 05/09/2025    HGBA1C 7.3 (H) 03/26/2025    HGBA1C 6.7 (H) 11/15/2024    HGBA1C 6.6 (H) 11/06/2024           Papo Castillo MD  Hand and Upper Extremity Surgery          *This note was dictated using Dragon voice recognition software. Please excuse any word substitutions or errors.*      Scribe Attestation      I,:  Raul Cid MA am acting as a scribe while in the presence of the attending physician.:       I,:  Papo Castillo MD personally performed the services described in this documentation    as scribed in my presence.:                    [1]   Past Medical History:  Diagnosis Date    Cardiac disease     Diabetes mellitus (HCC)     Hyperlipidemia     Hypertension     MI (myocardial infarction) (HCC)     Myocardial infarction (HCC)      Sleep apnea    [2]   Past Surgical History:  Procedure Laterality Date    CARDIAC CATHETERIZATION N/A 03/06/2023    Procedure: Cardiac catheterization;  Surgeon: Fernando Panda MD;  Location: [facility];  Service: Cardiology    CARDIAC CATHETERIZATION N/A 03/06/2023    Procedure: Cardiac pci;  Surgeon: Fernando Panda MD;  Location: [facility];  Service: Cardiology    CARDIAC CATHETERIZATION N/A 03/06/2023    Procedure: Cardiac Coronary Angiogram;  Surgeon: Fernando Panda MD;  Location: [facility];  Service: Cardiology    CARDIAC CATHETERIZATION Left 03/18/2024    Procedure: Cardiac catheterization;  Surgeon: Bob Fraser MD;  Location: [facility];  Service: Cardiology    CARDIAC CATHETERIZATION N/A 03/18/2024    Procedure: Cardiac pci;  Surgeon: Bob Fraser MD;  Location: [facility];  Service: Cardiology    CARDIAC SURGERY      COLONOSCOPY      CORONARY ANGIOPLASTY WITH STENT PLACEMENT      DEBRIDEMENT TENNIS ELBOW      HERNIA REPAIR      KNEE ARTHROSCOPY      ID ARTHRP INTERCARPAL/CARP/MTCRPL JT INTERPOSITION Right 1/13/2025    Procedure: Right thumb carpometacarpal joint arthroplasty with TightRope suspension;  Surgeon: Papo Castillo MD;  Location: [facility];  Service: Orthopedics    ID ARTHRP INTERCARPAL/CARP/MTCRPL JT INTERPOSITION Left 6/30/2025    Procedure: LEFT THUMB CARPOMETACARPAL JOINT ARTHROPLASTY WITH TIGHTROPE SUSPENSION - LEFT;  Surgeon: Papo Castillo MD;  Location: [facility];  Service: Orthopedics    ID NEUROPLASTY &/TRANSPOS MEDIAN NRV CARPAL TUNNE Left 10/21/2024    Procedure: RELEASE CARPAL TUNNEL - LEFT;  Surgeon: Papo Castillo MD;  Location: [facility];  Service: Orthopedics    ID NEUROPLASTY &/TRANSPOS MEDIAN NRV CARPAL TUNNE Right 1/13/2025    Procedure: RELEASE CARPAL TUNNEL - Right;  Surgeon: Papo Castillo MD;  Location: [facility];  Service: Orthopedics    ID NEUROPLASTY &/TRANSPOSITION ULNAR  NERVE ELBOW Left 10/21/2024    Procedure: RELEASE CUBITAL TUNNEL - LEFT, WITH SUBMUSCULAR TRANSPOSITION;  Surgeon: Papo Castillo MD;  Location: [facility];  Service: Orthopedics    OH NEUROPLASTY &/TRANSPOSITION ULNAR NERVE ELBOW Right 1/13/2025    Procedure: RELEASE CUBITAL TUNNEL - Right;  Surgeon: Papo Castillo MD;  Location: [facility];  Service: Orthopedics    SHOULDER ARTHROSCOPY      VASECTOMY     [3]   Family History  Problem Relation Name Age of Onset    Hypertension Maternal Grandfather Singh Crespo     Diabetes type I Maternal Grandmother Norma Pierce     Hypertension Paternal Grandfather Marcio pierce     Diabetes type II Paternal Grandmother Carin Larry    [4]   Allergies  Allergen Reactions    Banana - Food Allergy Anaphylaxis    Tetanus Toxoid Anaphylaxis

## 2025-07-13 NOTE — PROGRESS NOTES
Daily Note     Today's date: 2025  Patient name: Fidel Calderon  : 1965  MRN: [id number]  Referring provider: Papo Castillo,*  Dx:   Encounter Diagnosis     ICD-10-CM    1. Arthritis of carpometacarpal (CMC) joint of left thumb  M18.12       2. Aftercare following surgery of the musculoskeletal system  Z47.89                      Subjective: pt with more motion and less pain      Objective: See treatment diary below    AROM left FA S/P- 70/70; wrist E/F- 30/15                      Thumb MP- 0/15; IP- 0/25                      Digits- MP- 25/45; PIP- 30/60; DIP- 0/40  Inspection- post op dressings removed and replaced with FA based thumb spica for night and HB spica for daytime.  Pt instructed to wear at all times except therapy and hygiene, protecting sutures from getting wet.   Circumference at left wrist- 21.0 cm; Mps- 23.0 cm; thumb P1- 8.0 cm; MF P1- 8.2 cm  Sensation- pt notes occasional tingling in digits    Assessment: Tolerated treatment well. Patient would benefit from continued PT      Plan: Continue per plan of care.      Precautions: wear orthosis as directed      Manuals           STM  15 15 15 15        liners 4            WHFO 1-L            HFO 1-L                         HP/pulsed biph 15 15 15 15 15                                  Ther Ex             Wrist jogs 2/5 2/5 2/5 2/5 2/5        T all 2/5 2/5 2/5 2/5 2/5        TGE --> 2/3 2/3 2/3 2/5                                                                                                                                                       Modalities                          CP  15 15

## 2025-07-14 ENCOUNTER — OFFICE VISIT (OUTPATIENT)
Facility: CLINIC | Age: 60
End: 2025-07-14
Payer: OTHER MISCELLANEOUS

## 2025-07-14 DIAGNOSIS — Z47.89 AFTERCARE FOLLOWING SURGERY OF THE MUSCULOSKELETAL SYSTEM: ICD-10-CM

## 2025-07-14 DIAGNOSIS — M18.12 ARTHRITIS OF CARPOMETACARPAL (CMC) JOINT OF LEFT THUMB: Primary | ICD-10-CM

## 2025-07-14 PROCEDURE — 97112 NEUROMUSCULAR REEDUCATION: CPT | Performed by: PHYSICAL THERAPIST

## 2025-07-14 PROCEDURE — 97110 THERAPEUTIC EXERCISES: CPT | Performed by: PHYSICAL THERAPIST

## 2025-07-14 PROCEDURE — 97140 MANUAL THERAPY 1/> REGIONS: CPT | Performed by: PHYSICAL THERAPIST

## 2025-07-15 NOTE — PROGRESS NOTES
Daily Note     Today's date: 2025  Patient name: Fidel Calderon  : 1965  MRN: [id number]  Referring provider: Papo Castillo,*  Dx:   Encounter Diagnosis     ICD-10-CM    1. Arthritis of carpometacarpal (CMC) joint of left thumb  M18.12       2. Aftercare following surgery of the musculoskeletal system  Z47.89                      Subjective: mild soreness increase today in both hands      Objective: See treatment diary below    AROM left FA S/P- 70/70; wrist E/F- 30/15                      Thumb MP- 0/15; IP- 0/25                      Digits- MP- 25/45; PIP- 30/60; DIP- 0/40  Inspection- post op dressings removed and replaced with FA based thumb spica for night and HB spica for daytime.  Pt instructed to wear at all times except therapy and hygiene,  Circumference at left wrist- 21.0 cm; Mps- 23.0 cm; thumb P1- 8.0 cm; MF P1- 8.2 cm  Sensation- pt notes occasional tingling in digits       Assessment: Tolerated treatment well. Patient would benefit from continued PT      Plan: Continue per plan of care.      Precautions: wear orthosis as directed      Manuals          STM  15 15 15 15        liners 4            WHFO 1-L            HFO 1-L                         HP/pulsed biph 15 15 15 15 15                                  Ther Ex             Wrist jogs 2/5 2/5 2/5 2/5 2/5        T all 2/5 2/5 2/5 2/5 2/5        TGE --> 2/3 2/3 2/3 2/5                                                                                                                                                       Modalities                          CP  15 15 15

## 2025-07-16 ENCOUNTER — OFFICE VISIT (OUTPATIENT)
Facility: CLINIC | Age: 60
End: 2025-07-16
Payer: OTHER MISCELLANEOUS

## 2025-07-16 DIAGNOSIS — M18.12 ARTHRITIS OF CARPOMETACARPAL (CMC) JOINT OF LEFT THUMB: Primary | ICD-10-CM

## 2025-07-16 DIAGNOSIS — Z47.89 AFTERCARE FOLLOWING SURGERY OF THE MUSCULOSKELETAL SYSTEM: ICD-10-CM

## 2025-07-16 PROCEDURE — 97112 NEUROMUSCULAR REEDUCATION: CPT | Performed by: PHYSICAL THERAPIST

## 2025-07-16 PROCEDURE — 97140 MANUAL THERAPY 1/> REGIONS: CPT | Performed by: PHYSICAL THERAPIST

## 2025-07-16 PROCEDURE — 97110 THERAPEUTIC EXERCISES: CPT | Performed by: PHYSICAL THERAPIST

## 2025-07-17 NOTE — PROGRESS NOTES
Daily Note     Today's date: 2025  Patient name: Fidel Calderon  : 1965  MRN: [id number]  Referring provider: Papo Castillo,*  Dx:   Encounter Diagnosis     ICD-10-CM    1. Arthritis of carpometacarpal (CMC) joint of left thumb  M18.12       2. Aftercare following surgery of the musculoskeletal system  Z47.89                      Subjective: pt cont to note steady improvement, stiffness present but decreasing.  Pt feel numbness along the radial thumb with soreness along the incision.        Objective: See treatment diary below    AROM left FA S/P- 70/70; wrist E/F- 60/50                      Thumb MP- 0/40; IP- 0/35                      Digits- MP- 0/75; PIP- 0/90; DIP- 0/75  Inspection- pt wearing FA based thumb spica for night and HB spica for daytime.    Circumference at left wrist- 20.5 cm; Mps- 22.5 cm; thumb P1- 8.0 cm; MF P1- 8.2 cm  Sensation- pt notes occasional tingling in digits     Assessment: Tolerated treatment well. Patient would benefit from continued PT      Plan: Continue per plan of care.      Precautions: wear orthosis as directed; avoid opposition for 8 weeks, support CMC during AROM      Manuals   3 W        STM  15 15 15 15 15       liners 4            WHFO 1-L            HFO 1-L                         HP/pulsed biph 15 15 15 15 15 15                                 Ther Ex             Wrist jogs 2/5 2/5 2/5 2/5 2/5 2/5       T all 2/5 2/5 2/5 2/5 2/5 2/5       TGE --> 2/3 2/3 2/3 2/5 2/5                                                                                                                                                      Modalities             US scar     12        CP  15 15 15 def 15

## 2025-07-21 ENCOUNTER — OFFICE VISIT (OUTPATIENT)
Facility: CLINIC | Age: 60
End: 2025-07-21
Payer: OTHER MISCELLANEOUS

## 2025-07-21 DIAGNOSIS — Z47.89 AFTERCARE FOLLOWING SURGERY OF THE MUSCULOSKELETAL SYSTEM: ICD-10-CM

## 2025-07-21 DIAGNOSIS — M18.12 ARTHRITIS OF CARPOMETACARPAL (CMC) JOINT OF LEFT THUMB: Primary | ICD-10-CM

## 2025-07-21 PROCEDURE — 97140 MANUAL THERAPY 1/> REGIONS: CPT | Performed by: PHYSICAL THERAPIST

## 2025-07-21 PROCEDURE — 97110 THERAPEUTIC EXERCISES: CPT | Performed by: PHYSICAL THERAPIST

## 2025-07-21 PROCEDURE — 97112 NEUROMUSCULAR REEDUCATION: CPT | Performed by: PHYSICAL THERAPIST

## 2025-07-21 PROCEDURE — 97035 APP MDLTY 1+ULTRASOUND EA 15: CPT | Performed by: PHYSICAL THERAPIST

## 2025-07-22 NOTE — PROGRESS NOTES
Daily Note     Today's date: 2025  Patient name: Fidel Calderon  : 1965  MRN: [id number]  Referring provider: Papo Castillo,*  Dx:   Encounter Diagnosis     ICD-10-CM    1. Arthritis of carpometacarpal (CMC) joint of left thumb  M18.12                      Subjective: pt cautious with AROM, avoidig opposition and wide grasp      Objective: See treatment diary below    AROM left FA S/P- 70/70; wrist E/F- 60/50                      Thumb MP- 0/40; IP- 0/35                      Digits- MP- 0/75; PIP- 0/90; DIP- 0/75  Inspection- pt wearing FA based thumb spica for night and HB spica for daytime.    Circumference at left wrist- 20.5 cm; Mps- 22.5 cm; thumb P1- 8.0 cm; MF P1- 8.2 cm  Sensation- pt notes occasional tingling in digits     Assessment: Tolerated treatment well. Patient would benefit from continued PT      Plan: Continue per plan of care.      Precautions: wear orthosis as directed; avoid opposition for 8 weeks, support CMC during AROM      Manuals   3 W        STM  15 15 15 15 15       liners 4            WHFO 1-L            HFO 1-L                         HP/pulsed biph 15 15 15 15 15 15                                 Ther Ex             Wrist jogs 2/5 2/5 2/5 2/5 2/5 2/5       T all 2/ 2/5 2/5 2/5 2/5 2/5       TGE --> 2/3 2/3 2/3 2/5 2/5                                                                                                                                                      Modalities             US scar     12 12       CP  15 15 15 def def

## 2025-07-23 ENCOUNTER — OFFICE VISIT (OUTPATIENT)
Facility: CLINIC | Age: 60
End: 2025-07-23
Payer: OTHER MISCELLANEOUS

## 2025-07-23 DIAGNOSIS — M18.12 ARTHRITIS OF CARPOMETACARPAL (CMC) JOINT OF LEFT THUMB: Primary | ICD-10-CM

## 2025-07-23 PROCEDURE — 97035 APP MDLTY 1+ULTRASOUND EA 15: CPT | Performed by: PHYSICAL THERAPIST

## 2025-07-23 PROCEDURE — 97140 MANUAL THERAPY 1/> REGIONS: CPT | Performed by: PHYSICAL THERAPIST

## 2025-07-23 PROCEDURE — 97112 NEUROMUSCULAR REEDUCATION: CPT | Performed by: PHYSICAL THERAPIST

## 2025-07-23 PROCEDURE — 97110 THERAPEUTIC EXERCISES: CPT | Performed by: PHYSICAL THERAPIST

## 2025-07-28 ENCOUNTER — OFFICE VISIT (OUTPATIENT)
Facility: CLINIC | Age: 60
End: 2025-07-28
Payer: OTHER MISCELLANEOUS

## 2025-07-28 DIAGNOSIS — M18.12 ARTHRITIS OF CARPOMETACARPAL (CMC) JOINT OF LEFT THUMB: Primary | ICD-10-CM

## 2025-07-28 DIAGNOSIS — Z47.89 AFTERCARE FOLLOWING SURGERY OF THE MUSCULOSKELETAL SYSTEM: ICD-10-CM

## 2025-07-28 PROCEDURE — 97110 THERAPEUTIC EXERCISES: CPT | Performed by: PHYSICAL THERAPIST

## 2025-07-28 PROCEDURE — 97140 MANUAL THERAPY 1/> REGIONS: CPT | Performed by: PHYSICAL THERAPIST

## 2025-07-28 PROCEDURE — 97112 NEUROMUSCULAR REEDUCATION: CPT | Performed by: PHYSICAL THERAPIST

## 2025-07-30 ENCOUNTER — OFFICE VISIT (OUTPATIENT)
Facility: CLINIC | Age: 60
End: 2025-07-30
Payer: OTHER MISCELLANEOUS

## 2025-07-30 DIAGNOSIS — M18.12 ARTHRITIS OF CARPOMETACARPAL (CMC) JOINT OF LEFT THUMB: Primary | ICD-10-CM

## 2025-07-30 DIAGNOSIS — Z47.89 AFTERCARE FOLLOWING SURGERY OF THE MUSCULOSKELETAL SYSTEM: ICD-10-CM

## 2025-07-30 PROCEDURE — 97140 MANUAL THERAPY 1/> REGIONS: CPT | Performed by: PHYSICAL THERAPIST

## 2025-07-30 PROCEDURE — 97112 NEUROMUSCULAR REEDUCATION: CPT | Performed by: PHYSICAL THERAPIST

## 2025-07-30 PROCEDURE — 97035 APP MDLTY 1+ULTRASOUND EA 15: CPT | Performed by: PHYSICAL THERAPIST

## 2025-07-30 PROCEDURE — 97110 THERAPEUTIC EXERCISES: CPT | Performed by: PHYSICAL THERAPIST

## 2025-08-06 ENCOUNTER — OFFICE VISIT (OUTPATIENT)
Facility: CLINIC | Age: 60
End: 2025-08-06
Payer: OTHER MISCELLANEOUS

## 2025-08-06 ENCOUNTER — OFFICE VISIT (OUTPATIENT)
Dept: OBGYN CLINIC | Facility: MEDICAL CENTER | Age: 60
End: 2025-08-06

## 2025-08-06 VITALS — WEIGHT: 201 LBS | BODY MASS INDEX: 27.22 KG/M2 | HEIGHT: 72 IN

## 2025-08-06 DIAGNOSIS — Z47.89 AFTERCARE FOLLOWING SURGERY OF THE MUSCULOSKELETAL SYSTEM: ICD-10-CM

## 2025-08-06 DIAGNOSIS — M18.11 ARTHRITIS OF CARPOMETACARPAL (CMC) JOINT OF RIGHT THUMB: ICD-10-CM

## 2025-08-06 DIAGNOSIS — Z98.890 S/P MUSCULOSKELETAL SYSTEM SURGERY: ICD-10-CM

## 2025-08-06 DIAGNOSIS — M18.12 ARTHRITIS OF CARPOMETACARPAL (CMC) JOINT OF LEFT THUMB: Primary | ICD-10-CM

## 2025-08-06 PROCEDURE — 97140 MANUAL THERAPY 1/> REGIONS: CPT | Performed by: PHYSICAL THERAPIST

## 2025-08-06 PROCEDURE — 99024 POSTOP FOLLOW-UP VISIT: CPT | Performed by: ORTHOPAEDIC SURGERY

## 2025-08-06 PROCEDURE — 97035 APP MDLTY 1+ULTRASOUND EA 15: CPT | Performed by: PHYSICAL THERAPIST

## 2025-08-06 PROCEDURE — 97110 THERAPEUTIC EXERCISES: CPT | Performed by: PHYSICAL THERAPIST

## 2025-08-06 PROCEDURE — 97112 NEUROMUSCULAR REEDUCATION: CPT | Performed by: PHYSICAL THERAPIST

## 2025-08-12 ENCOUNTER — OFFICE VISIT (OUTPATIENT)
Facility: CLINIC | Age: 60
End: 2025-08-12
Payer: OTHER MISCELLANEOUS

## 2025-08-13 ENCOUNTER — OFFICE VISIT (OUTPATIENT)
Facility: CLINIC | Age: 60
End: 2025-08-13
Payer: OTHER MISCELLANEOUS

## 2025-08-18 ENCOUNTER — OFFICE VISIT (OUTPATIENT)
Facility: CLINIC | Age: 60
End: 2025-08-18
Payer: OTHER MISCELLANEOUS

## 2025-08-18 DIAGNOSIS — M18.12 ARTHRITIS OF CARPOMETACARPAL (CMC) JOINT OF LEFT THUMB: Primary | ICD-10-CM

## 2025-08-18 DIAGNOSIS — Z47.89 AFTERCARE FOLLOWING SURGERY OF THE MUSCULOSKELETAL SYSTEM: ICD-10-CM

## 2025-08-18 PROCEDURE — 97112 NEUROMUSCULAR REEDUCATION: CPT | Performed by: PHYSICAL THERAPIST

## 2025-08-18 PROCEDURE — 97110 THERAPEUTIC EXERCISES: CPT | Performed by: PHYSICAL THERAPIST

## 2025-08-18 PROCEDURE — 97035 APP MDLTY 1+ULTRASOUND EA 15: CPT | Performed by: PHYSICAL THERAPIST

## 2025-08-18 PROCEDURE — 97140 MANUAL THERAPY 1/> REGIONS: CPT | Performed by: PHYSICAL THERAPIST

## 2025-08-20 ENCOUNTER — OFFICE VISIT (OUTPATIENT)
Facility: CLINIC | Age: 60
End: 2025-08-20
Payer: OTHER MISCELLANEOUS

## 2025-08-20 DIAGNOSIS — M18.12 ARTHRITIS OF CARPOMETACARPAL (CMC) JOINT OF LEFT THUMB: Primary | ICD-10-CM

## 2025-08-20 DIAGNOSIS — Z47.89 AFTERCARE FOLLOWING SURGERY OF THE MUSCULOSKELETAL SYSTEM: ICD-10-CM

## 2025-08-20 PROCEDURE — 97112 NEUROMUSCULAR REEDUCATION: CPT | Performed by: PHYSICAL THERAPIST

## 2025-08-20 PROCEDURE — 97035 APP MDLTY 1+ULTRASOUND EA 15: CPT | Performed by: PHYSICAL THERAPIST

## 2025-08-20 PROCEDURE — 97140 MANUAL THERAPY 1/> REGIONS: CPT | Performed by: PHYSICAL THERAPIST

## 2025-08-20 PROCEDURE — 97110 THERAPEUTIC EXERCISES: CPT | Performed by: PHYSICAL THERAPIST

## (undated) DEVICE — GUIDELINER CATHETERS ARE INTENDED TO BE USED IN CONJUNCTION WITH GUIDE CATHETERS TO ACCESS DISCRETE REGIONS OF THE CORONARY AND/OR PERIPHERAL VASCULATURE, AND TO FACILITATE PLACEMENT OF INTERVENTIONAL DEVICES.: Brand: GUIDELINER® V3 CATHETER

## (undated) DEVICE — TOURNIQUET HEMACLEAR 24-40CM YELLOW STRL

## (undated) DEVICE — CATH GUIDE LAUNCHER 6FR EBU 3.75

## (undated) DEVICE — Device

## (undated) DEVICE — CHLORHEXIDINE 4PCT 4 OZ

## (undated) DEVICE — GLOVE INDICATOR PI UNDERGLOVE SZ 8 BLUE

## (undated) DEVICE — GLIDESHEATH BASIC HYDROPHILIC COATED INTRODUCER SHEATH: Brand: GLIDESHEATH

## (undated) DEVICE — MINI TREK CORONARY DILATATION CATHETER 2.0 MM X 15 MM / RAPID-EXCHANGE: Brand: MINI TREK

## (undated) DEVICE — SUT VICRYL 3-0 PS-2 27 IN J427H

## (undated) DEVICE — PADDING CAST 4 IN  COTTON STRL

## (undated) DEVICE — TR BAND RADIAL ARTERY COMPRESSION DEVICE: Brand: TR BAND

## (undated) DEVICE — GUIDEWIRE FFR VERRATA PLUS 185CM STR

## (undated) DEVICE — PAD CAST 4 IN COTTON NON STERILE

## (undated) DEVICE — GUIDEWIRE WHOLEY HI TORQUE INTERM MOD J .035 145CM

## (undated) DEVICE — KNIFE LIGHT 10,PK: Brand: KNIFELIGHT

## (undated) DEVICE — INTENDED FOR TISSUE SEPARATION, AND OTHER PROCEDURES THAT REQUIRE A SHARP SURGICAL BLADE TO PUNCTURE OR CUT.: Brand: BARD-PARKER ® CARBON RIB-BACK BLADES

## (undated) DEVICE — TREK CORONARY DILATATION CATHETER 2.50 MM X 15 MM / RAPID-EXCHANGE: Brand: TREK

## (undated) DEVICE — GLIDESHEATH SLENDER STAINLESS STEEL KIT: Brand: GLIDESHEATH SLENDER

## (undated) DEVICE — ACE WRAP 4 IN STERILE

## (undated) DEVICE — GAUZE SPONGES,16 PLY: Brand: CURITY

## (undated) DEVICE — RADIFOCUS OPTITORQUE ANGIOGRAPHIC CATHETER: Brand: OPTITORQUE

## (undated) DEVICE — ARM SLING: Brand: DEROYAL

## (undated) DEVICE — SUT VICRYL 3-0 SH 27 IN J416H

## (undated) DEVICE — RUNTHROUGH NS EXTRA FLOPPY PTCA GUIDEWIRE: Brand: RUNTHROUGH

## (undated) DEVICE — SUT ETHILON 4-0 PS-2 18 IN 1667H

## (undated) DEVICE — C-ARM: Brand: UNBRANDED

## (undated) DEVICE — CATH GUIDE LAUNCHER 6FR EBU 3.5

## (undated) DEVICE — STERILE BETHLEHEM PLASTIC HAND: Brand: CARDINAL HEALTH

## (undated) DEVICE — PREP PAD BNS: Brand: CONVERTORS

## (undated) DEVICE — GLOVE PI ULTRA TOUCH SZ.8.0

## (undated) DEVICE — CATH GUIDE LAUNCHER 6FR EBU 3.0

## (undated) DEVICE — SUT VICRYL 0 CT-1 36 IN J946H

## (undated) DEVICE — OCCLUSIVE GAUZE STRIP,3% BISMUTH TRIBROMOPHENATE IN PETROLATUM BLEND: Brand: XEROFORM

## (undated) DEVICE — CATH DIAG 6FR IMPULSE 100CM MPA1

## (undated) DEVICE — Device: Brand: ASAHI SILVERWAY

## (undated) DEVICE — PREMIUM DRY TRAY LF: Brand: MEDLINE INDUSTRIES, INC.

## (undated) DEVICE — GLOVE INDICATOR PI UNDERGLOVE SZ 6.5 BLUE

## (undated) DEVICE — NEEDLE 25G X 1 1/2

## (undated) DEVICE — NC TREK NEO™ CORONARY DILATATION CATHETER 3.00 MM X 15 MM / RAPID-EXCHANGE: Brand: NC TREK NEO™

## (undated) DEVICE — GLOVE SRG BIOGEL 6.5

## (undated) DEVICE — CATH GUIDE LAUNCHER 6FR JL 4.0

## (undated) DEVICE — ACE WRAP 3 IN UNSTERILE

## (undated) DEVICE — ABDOMINAL PAD: Brand: DERMACEA

## (undated) DEVICE — NC TREK NEO™ CORONARY DILATATION CATHETER 2.50 X 8 MM / RAPID-EXCHANGE: Brand: NC TREK NEO™

## (undated) DEVICE — NC TREK NEO™ CORONARY DILATATION CATHETER 2.75 MM X 8 MM / RAPID-EXCHANGE: Brand: NC TREK NEO™

## (undated) DEVICE — CATH GUIDE LAUNCHER 6FR JR4 110CM